# Patient Record
Sex: FEMALE | Race: BLACK OR AFRICAN AMERICAN | NOT HISPANIC OR LATINO | Employment: OTHER | URBAN - METROPOLITAN AREA
[De-identification: names, ages, dates, MRNs, and addresses within clinical notes are randomized per-mention and may not be internally consistent; named-entity substitution may affect disease eponyms.]

---

## 2017-06-19 ENCOUNTER — HOSPITAL ENCOUNTER (EMERGENCY)
Facility: HOSPITAL | Age: 62
Discharge: HOME | End: 2017-06-19
Attending: EMERGENCY MEDICINE
Payer: MEDICAID

## 2017-06-19 VITALS
OXYGEN SATURATION: 98 % | SYSTOLIC BLOOD PRESSURE: 148 MMHG | HEIGHT: 68 IN | TEMPERATURE: 98 F | BODY MASS INDEX: 37.89 KG/M2 | DIASTOLIC BLOOD PRESSURE: 74 MMHG | WEIGHT: 250 LBS | RESPIRATION RATE: 15 BRPM | HEART RATE: 74 BPM
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
## 2017-06-19 DIAGNOSIS — S43.006A DISLOCATION CLOSED, SHOULDER: ICD-10-CM

## 2017-06-19 DIAGNOSIS — M75.101 TEAR OF RIGHT ROTATOR CUFF, UNSPECIFIED TEAR EXTENT: ICD-10-CM

## 2017-06-19 DIAGNOSIS — S43.014A ANTERIOR DISLOCATION OF RIGHT SHOULDER, INITIAL ENCOUNTER: Primary | ICD-10-CM

## 2017-06-19 PROCEDURE — 20610 DRAIN/INJ JOINT/BURSA W/O US: CPT

## 2017-06-19 PROCEDURE — 23650 CLTX SHO DSLC W/MNPJ WO ANES: CPT

## 2017-06-19 PROCEDURE — 25000003 PHARM REV CODE 250: Performed by: EMERGENCY MEDICINE

## 2017-06-19 PROCEDURE — 99284 EMERGENCY DEPT VISIT MOD MDM: CPT

## 2017-06-19 PROCEDURE — 63600175 PHARM REV CODE 636 W HCPCS: Performed by: EMERGENCY MEDICINE

## 2017-06-19 RX ORDER — LIDOCAINE HYDROCHLORIDE 10 MG/ML
INJECTION INFILTRATION; PERINEURAL
Status: DISCONTINUED
Start: 2017-06-19 | End: 2017-06-19 | Stop reason: HOSPADM

## 2017-06-19 RX ORDER — HYDROMORPHONE HYDROCHLORIDE 1 MG/ML
1 INJECTION, SOLUTION INTRAMUSCULAR; INTRAVENOUS; SUBCUTANEOUS
Status: COMPLETED | OUTPATIENT
Start: 2017-06-19 | End: 2017-06-19

## 2017-06-19 RX ORDER — LIDOCAINE HYDROCHLORIDE 10 MG/ML
10 INJECTION, SOLUTION EPIDURAL; INFILTRATION; INTRACAUDAL; PERINEURAL ONCE
Status: COMPLETED | OUTPATIENT
Start: 2017-06-19 | End: 2017-06-19

## 2017-06-19 RX ORDER — LOSARTAN POTASSIUM 50 MG/1
50 TABLET ORAL DAILY
Status: ON HOLD | COMMUNITY
End: 2019-06-13 | Stop reason: SDUPTHER

## 2017-06-19 RX ORDER — ATORVASTATIN CALCIUM 40 MG/1
40 TABLET, FILM COATED ORAL DAILY
Status: ON HOLD | COMMUNITY
End: 2019-06-13 | Stop reason: SDUPTHER

## 2017-06-19 RX ORDER — FERROUS SULFATE 325(65) MG
325 TABLET ORAL
Status: ON HOLD | COMMUNITY
End: 2019-06-13 | Stop reason: SDUPTHER

## 2017-06-19 RX ORDER — ONDANSETRON 2 MG/ML
4 INJECTION INTRAMUSCULAR; INTRAVENOUS
Status: DISCONTINUED | OUTPATIENT
Start: 2017-06-19 | End: 2017-06-19

## 2017-06-19 RX ORDER — HYDROMORPHONE HYDROCHLORIDE 1 MG/ML
1 INJECTION, SOLUTION INTRAMUSCULAR; INTRAVENOUS; SUBCUTANEOUS
Status: DISCONTINUED | OUTPATIENT
Start: 2017-06-19 | End: 2017-06-19

## 2017-06-19 RX ORDER — ONDANSETRON 2 MG/ML
4 INJECTION INTRAMUSCULAR; INTRAVENOUS ONCE
Status: COMPLETED | OUTPATIENT
Start: 2017-06-19 | End: 2017-06-19

## 2017-06-19 RX ORDER — HYDROCODONE BITARTRATE AND ACETAMINOPHEN 5; 325 MG/1; MG/1
1 TABLET ORAL EVERY 4 HOURS PRN
Qty: 18 TABLET | Refills: 0 | Status: ON HOLD | OUTPATIENT
Start: 2017-06-19 | End: 2019-07-29

## 2017-06-19 RX ADMIN — HYDROMORPHONE HYDROCHLORIDE 1 MG: 1 INJECTION, SOLUTION INTRAMUSCULAR; INTRAVENOUS; SUBCUTANEOUS at 02:06

## 2017-06-19 RX ADMIN — ONDANSETRON 4 MG: 2 INJECTION INTRAMUSCULAR; INTRAVENOUS at 02:06

## 2017-06-19 RX ADMIN — LIDOCAINE HYDROCHLORIDE 10 MG: 10 INJECTION, SOLUTION EPIDURAL; INFILTRATION; INTRACAUDAL; PERINEURAL at 03:06

## 2017-06-19 NOTE — ED PROVIDER NOTES
"Encounter Date: 6/19/2017       History     Chief Complaint   Patient presents with    Fall     fell while transferring in wheelchair yesterday.  Now reports right shoulder pain that kept her awake all night.  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  Denies LOC     Review of patient's allergies indicates:  No Known Allergies  Fell while transferring from chair to bed        The history is provided by the patient and a relative.   Arm Injury    The incident occurred yesterday. The incident occurred at home. The injury mechanism was a fall. The context of the injury is unknown. The wounds were not self-inflicted. There is an injury to the right shoulder. There have been no prior injuries to these areas. Associated symptoms include numbness (chronically) and focal weakness (R arm/leg, chronically). Pertinent negatives include no altered mental status, no headaches, no neck pain and no difficulty breathing.     Past Medical History:   Diagnosis Date    Diabetes mellitus     Hypertension     Stroke      No past surgical history on file.  No family history on file.  Social History   Substance Use Topics    Smoking status: Never Smoker    Smokeless tobacco: Not on file    Alcohol use Not on file     Review of Systems   Musculoskeletal: Negative for neck pain.   Neurological: Positive for focal weakness (R arm/leg, chronically) and numbness (chronically). Negative for headaches.   All other systems reviewed and are negative.      Physical Exam     Initial Vitals [06/19/17 1307]   BP Pulse Resp Temp SpO2   (!) 166/93 91 16 98.2 °F (36.8 °C) 99 %     Physical Exam    Nursing note and vitals reviewed.  Constitutional: She appears well-developed and well-nourished. She is not diaphoretic. No distress.   HENT:   Head: Normocephalic and atraumatic.   Mouth/Throat: Oropharynx is clear and moist.   Eyes: Conjunctivae and EOM are normal.   Neck: Normal range of motion. Neck supple. "   Cardiovascular: Normal rate, regular rhythm, normal heart sounds and intact distal pulses.   Pulmonary/Chest: Breath sounds normal. No respiratory distress.   Abdominal: Soft. There is no tenderness.   Musculoskeletal:        Right shoulder: She exhibits decreased range of motion, tenderness, deformity and decreased strength.        Arms:  Neurological: She is alert and oriented to person, place, and time. She displays atrophy. A sensory deficit (R distal upper extremity, sensation over deltoid intact) is present. She exhibits abnormal muscle tone.   R upper and lower extremity strength deficit   Skin: Skin is warm and dry. No rash noted. No erythema.   Psychiatric: She has a normal mood and affect.         ED Course   Orthopedic Injury  Date/Time: 6/19/2017 3:00 PM  Authorized by: LEFORT, GUY J.   Performed by: LEFORT, GUY J.  Consent Done: Yes  Consent: Written consent obtained.  Risks and benefits: risks, benefits and alternatives were discussed  Injury location: shoulder  Location details: right shoulder  Injury type: dislocation  Dislocation type: anterior  Hill-Sachs deformity: no  Chronicity: new  Pre-procedure distal perfusion: normal  Pre-procedure neurological function: absent  Pre-procedure neurological function comment: stable  Local anesthesia used: yes    Anesthesia:  Local anesthesia used: yes  Anesthesia: see MAR for details (joint injection)  Anesthetic total: 10 mL  Anesthesia: see MAR for details (joint injection)  Sedation:  Patient sedated: no    Manipulation performed: yes  Reduction method: traction and counter traction  Reduction successful: yes  X-ray confirmed reduction: yes  Immobilization: sling  Complications: No    Arthrocentesis  Date/Time: 6/19/2017 3:01 PM  Performed by: LEFORT, GUY J.  Authorized by: LEFORT, GUY J.   Consent Done: Yes  Consent: Written consent obtained.  Consent given by: patient  Patient understanding: patient states understanding of the procedure being  "performed  Patient consent: the patient's understanding of the procedure matches consent given  Procedure consent: procedure consent matches procedure scheduled  Relevant documents: relevant documents present and verified  Site marked: the operative site was marked  Imaging studies: imaging studies available  Required items: required blood products, implants, devices, and special equipment available  Patient identity confirmed: , MRN, name, verbally with patient and provided demographic data  Time out: Immediately prior to procedure a "time out" was called to verify the correct patient, procedure, equipment, support staff and site/side marked as required.  Indications: pain   Body area: shoulder  Joint: right shoulder  Local anesthesia used: no    Anesthesia:  Local anesthesia used: no  Patient sedated: no  Needle size: 18 G  Approach: lateral  Lidocaine 1% amount: 10 mL  Patient tolerance: Patient tolerated the procedure well with no immediate complications  Complications: No        Labs Reviewed - No data to display       X-Rays:   Independently Interpreted Readings:   Other Readings:     X-Ray Shoulder Trauma Right (Final result)   Result time 17 16:30:47   Final result by Ronen Cui MD (17 16:30:47)             Impression:         Apparent repositioning of the humerus which now and more appropriate proper alignment with the glenoid. No evidence of dislocation at this time. Narrowing of the glenoid acromion and intervals suggests possibility of rotator cuff tear. Mild degenerative change. No fracture.      Electronically signed by: RONEN CUI MD  Date: 17  Time: 16:30         Narrative:     XR shoulder    17 15:29:41    Accession# [id number]    CLINICAL INDICATION: 61 year old F with  shoulder dislocation    TECHNIQUE: AP and Y. views of the right shoulder.    COMPARISON: 2017 at 13:59    FINDINGS              X-Ray Shoulder Trauma Right (Final result)   Result time " 06/19/17 15:01:03   Final result by Ronen Cui MD (06/19/17 15:01:03)             Impression:         Osseous structures appear intact, without evidence of fracture.    The humeral head appears inferiorly subluxed relative to the glenoid on the AP view suggestive of dislocation. However, it does not appear anteriorly or posteriorly displaced on the Y-view. Rotator cuff tear could have a similar appearance. Clinical correlation advised with CT for clarification if warranted.    Mild degenerative changes of the acromioclavicular joint.    Limited views of the chest are unremarkable.      Electronically signed by: RONEN CUI MD  Date: 06/19/17  Time: 15:01         Narrative:     XR shoulder    06/19/17 14:02:39    Accession# [id number]    CLINICAL INDICATION: 61 year old F with  all     TECHNIQUE: AP and transcatheter Y view of the right shoulder shoulder.    COMPARISON: None    FINDINGS              Medical Decision Making:   Differential Diagnosis:   Fx, d/l, soft tissue, ptx  ED Management:  Reduced in department with arthroblock and manipulation.  Discussed immobilization, follow up with ortho, likely not surgical candidate discussed this with family, discussed narcotic use for pain risk and benefit, and indications for return.                   ED Course     Clinical Impression:   The primary encounter diagnosis was Anterior dislocation of right shoulder, initial encounter. Diagnoses of Fall, Dislocation closed, shoulder, and Tear of right rotator cuff, unspecified tear extent were also pertinent to this visit.    Disposition:   Disposition: Discharged  Condition: Stable       Guy J. Lefort, MD  06/19/17 4239

## 2017-06-19 NOTE — ED TRIAGE NOTES
62 Y/O F with CC of fall. Pt reports fell yesterday injuring her RUE. Has hx of CVA with R sided deficits. Complains of pain to R shoulder. No other complaints verbalized. Will continue to monitor.

## 2019-06-11 PROBLEM — D50.0 IRON DEFICIENCY ANEMIA DUE TO CHRONIC BLOOD LOSS: Status: ACTIVE | Noted: 2019-06-11

## 2019-06-11 PROBLEM — I10 ESSENTIAL HYPERTENSION: Status: ACTIVE | Noted: 2019-06-11

## 2019-07-28 ENCOUNTER — HOSPITAL ENCOUNTER (INPATIENT)
Facility: HOSPITAL | Age: 64
LOS: 4 days | Discharge: HOME HEALTH | DRG: 064 | End: 2019-08-02
Attending: EMERGENCY MEDICINE | Admitting: HOSPITALIST
Payer: MEDICARE

## 2019-07-28 DIAGNOSIS — D49.0 CECAL NEOPLASM: ICD-10-CM

## 2019-07-28 DIAGNOSIS — G93.41 ENCEPHALOPATHY, METABOLIC: ICD-10-CM

## 2019-07-28 DIAGNOSIS — Z74.09 IMPAIRED FUNCTIONAL MOBILITY AND ENDURANCE: ICD-10-CM

## 2019-07-28 DIAGNOSIS — I61.4 RIGHT-SIDED NONTRAUMATIC INTRACEREBRAL HEMORRHAGE OF CEREBELLUM: Primary | ICD-10-CM

## 2019-07-28 DIAGNOSIS — C78.7 METASTASIS TO LIVER: ICD-10-CM

## 2019-07-28 LAB
ALBUMIN SERPL BCP-MCNC: 2.6 G/DL (ref 3.5–5.2)
ALP SERPL-CCNC: 143 U/L (ref 55–135)
ALT SERPL W/O P-5'-P-CCNC: 7 U/L (ref 10–44)
ANION GAP SERPL CALC-SCNC: 12 MMOL/L (ref 8–16)
ANISOCYTOSIS BLD QL SMEAR: SLIGHT
AST SERPL-CCNC: 34 U/L (ref 10–40)
BACTERIA #/AREA URNS HPF: ABNORMAL /HPF
BASOPHILS # BLD AUTO: 0.07 K/UL (ref 0–0.2)
BASOPHILS NFR BLD: 0.6 % (ref 0–1.9)
BILIRUB SERPL-MCNC: 1 MG/DL (ref 0.1–1)
BILIRUB UR QL STRIP: NEGATIVE
BUN SERPL-MCNC: 11 MG/DL (ref 8–23)
CALCIUM SERPL-MCNC: 9.8 MG/DL (ref 8.7–10.5)
CHLORIDE SERPL-SCNC: 95 MMOL/L (ref 95–110)
CLARITY UR: CLEAR
CO2 SERPL-SCNC: 29 MMOL/L (ref 23–29)
COLOR UR: YELLOW
CREAT SERPL-MCNC: 0.8 MG/DL (ref 0.5–1.4)
DIFFERENTIAL METHOD: ABNORMAL
EOSINOPHIL # BLD AUTO: 0.1 K/UL (ref 0–0.5)
EOSINOPHIL NFR BLD: 0.8 % (ref 0–8)
ERYTHROCYTE [DISTWIDTH] IN BLOOD BY AUTOMATED COUNT: 25.3 % (ref 11.5–14.5)
EST. GFR  (AFRICAN AMERICAN): >60 ML/MIN/1.73 M^2
EST. GFR  (NON AFRICAN AMERICAN): >60 ML/MIN/1.73 M^2
GLUCOSE SERPL-MCNC: 124 MG/DL (ref 70–110)
GLUCOSE UR QL STRIP: NEGATIVE
HCT VFR BLD AUTO: 35.1 % (ref 37–48.5)
HGB BLD-MCNC: 10.2 G/DL (ref 12–16)
HGB UR QL STRIP: NEGATIVE
HYPOCHROMIA BLD QL SMEAR: ABNORMAL
IMM GRANULOCYTES # BLD AUTO: 0.07 K/UL (ref 0–0.04)
IMM GRANULOCYTES NFR BLD AUTO: 0.6 % (ref 0–0.5)
INR PPP: 1 (ref 0.8–1.2)
KETONES UR QL STRIP: NEGATIVE
LACTATE SERPL-SCNC: 1.9 MMOL/L (ref 0.5–2.2)
LEUKOCYTE ESTERASE UR QL STRIP: ABNORMAL
LYMPHOCYTES # BLD AUTO: 1.8 K/UL (ref 1–4.8)
LYMPHOCYTES NFR BLD: 15.5 % (ref 18–48)
MCH RBC QN AUTO: 21.7 PG (ref 27–31)
MCHC RBC AUTO-ENTMCNC: 29.1 G/DL (ref 32–36)
MCV RBC AUTO: 75 FL (ref 82–98)
MICROSCOPIC COMMENT: ABNORMAL
MONOCYTES # BLD AUTO: 1.2 K/UL (ref 0.3–1)
MONOCYTES NFR BLD: 10.2 % (ref 4–15)
NEUTROPHILS # BLD AUTO: 8.5 K/UL (ref 1.8–7.7)
NEUTROPHILS NFR BLD: 72.3 % (ref 38–73)
NITRITE UR QL STRIP: POSITIVE
NON-SQ EPI CELLS #/AREA URNS HPF: 2 /HPF
NRBC BLD-RTO: 0 /100 WBC
PH UR STRIP: 7 [PH] (ref 5–8)
PLATELET # BLD AUTO: 506 K/UL (ref 150–350)
PMV BLD AUTO: 9.4 FL (ref 9.2–12.9)
POCT GLUCOSE: 133 MG/DL (ref 70–110)
POTASSIUM SERPL-SCNC: 3.1 MMOL/L (ref 3.5–5.1)
PROT SERPL-MCNC: 8.1 G/DL (ref 6–8.4)
PROT UR QL STRIP: ABNORMAL
PROTHROMBIN TIME: 10.8 SEC (ref 9–12.5)
RBC # BLD AUTO: 4.71 M/UL (ref 4–5.4)
SCHISTOCYTES BLD QL SMEAR: PRESENT
SODIUM SERPL-SCNC: 136 MMOL/L (ref 136–145)
SP GR UR STRIP: <=1.005 (ref 1–1.03)
SQUAMOUS #/AREA URNS HPF: 7 /HPF
TSH SERPL DL<=0.005 MIU/L-ACNC: 2.62 UIU/ML (ref 0.4–4)
URN SPEC COLLECT METH UR: ABNORMAL
UROBILINOGEN UR STRIP-ACNC: ABNORMAL EU/DL
WBC # BLD AUTO: 11.68 K/UL (ref 3.9–12.7)
WBC #/AREA URNS HPF: 13 /HPF (ref 0–5)
WBC CLUMPS URNS QL MICRO: ABNORMAL

## 2019-07-28 PROCEDURE — 96360 HYDRATION IV INFUSION INIT: CPT

## 2019-07-28 PROCEDURE — 80053 COMPREHEN METABOLIC PANEL: CPT

## 2019-07-28 PROCEDURE — 85025 COMPLETE CBC W/AUTO DIFF WBC: CPT

## 2019-07-28 PROCEDURE — 85610 PROTHROMBIN TIME: CPT

## 2019-07-28 PROCEDURE — 25500020 PHARM REV CODE 255: Performed by: EMERGENCY MEDICINE

## 2019-07-28 PROCEDURE — 96361 HYDRATE IV INFUSION ADD-ON: CPT

## 2019-07-28 PROCEDURE — 25000003 PHARM REV CODE 250: Performed by: EMERGENCY MEDICINE

## 2019-07-28 PROCEDURE — 63600175 PHARM REV CODE 636 W HCPCS: Performed by: EMERGENCY MEDICINE

## 2019-07-28 PROCEDURE — 99291 CRITICAL CARE FIRST HOUR: CPT | Mod: 25

## 2019-07-28 PROCEDURE — 83605 ASSAY OF LACTIC ACID: CPT

## 2019-07-28 PROCEDURE — 84443 ASSAY THYROID STIM HORMONE: CPT

## 2019-07-28 PROCEDURE — 81000 URINALYSIS NONAUTO W/SCOPE: CPT

## 2019-07-28 PROCEDURE — 82962 GLUCOSE BLOOD TEST: CPT

## 2019-07-28 RX ORDER — POTASSIUM CHLORIDE 20 MEQ/1
40 TABLET, EXTENDED RELEASE ORAL
Status: COMPLETED | OUTPATIENT
Start: 2019-07-28 | End: 2019-07-28

## 2019-07-28 RX ORDER — LABETALOL HYDROCHLORIDE 5 MG/ML
20 INJECTION, SOLUTION INTRAVENOUS
Status: DISCONTINUED | OUTPATIENT
Start: 2019-07-28 | End: 2019-07-29

## 2019-07-28 RX ADMIN — POTASSIUM CHLORIDE 40 MEQ: 1500 TABLET, EXTENDED RELEASE ORAL at 10:07

## 2019-07-28 RX ADMIN — IOHEXOL 75 ML: 350 INJECTION, SOLUTION INTRAVENOUS at 09:07

## 2019-07-28 RX ADMIN — SODIUM CHLORIDE 500 ML: 0.9 INJECTION, SOLUTION INTRAVENOUS at 07:07

## 2019-07-29 PROBLEM — D49.0 CECAL NEOPLASM: Status: ACTIVE | Noted: 2019-07-29

## 2019-07-29 PROBLEM — I61.4 RIGHT-SIDED NONTRAUMATIC INTRACEREBRAL HEMORRHAGE OF CEREBELLUM: Status: ACTIVE | Noted: 2019-07-29

## 2019-07-29 PROBLEM — G93.41 ENCEPHALOPATHY, METABOLIC: Status: ACTIVE | Noted: 2019-07-29

## 2019-07-29 LAB
ALBUMIN SERPL BCP-MCNC: 2 G/DL (ref 3.5–5.2)
ALP SERPL-CCNC: 126 U/L (ref 55–135)
ALT SERPL W/O P-5'-P-CCNC: 6 U/L (ref 10–44)
AMMONIA PLAS-SCNC: 30 UMOL/L (ref 10–50)
AMPHET+METHAMPHET UR QL: NEGATIVE
ANION GAP SERPL CALC-SCNC: 11 MMOL/L (ref 8–16)
AST SERPL-CCNC: 30 U/L (ref 10–40)
BACTERIA #/AREA URNS AUTO: ABNORMAL /HPF
BARBITURATES UR QL SCN>200 NG/ML: NEGATIVE
BASOPHILS # BLD AUTO: 0.05 K/UL (ref 0–0.2)
BASOPHILS NFR BLD: 0.6 % (ref 0–1.9)
BENZODIAZ UR QL SCN>200 NG/ML: NEGATIVE
BILIRUB SERPL-MCNC: 0.6 MG/DL (ref 0.1–1)
BILIRUB UR QL STRIP: NEGATIVE
BUN SERPL-MCNC: 8 MG/DL (ref 8–23)
BZE UR QL SCN: NEGATIVE
CALCIUM SERPL-MCNC: 8.9 MG/DL (ref 8.7–10.5)
CANNABINOIDS UR QL SCN: NEGATIVE
CHLORIDE SERPL-SCNC: 99 MMOL/L (ref 95–110)
CLARITY UR REFRACT.AUTO: CLEAR
CO2 SERPL-SCNC: 26 MMOL/L (ref 23–29)
COLOR UR AUTO: YELLOW
CREAT SERPL-MCNC: 0.7 MG/DL (ref 0.5–1.4)
CREAT UR-MCNC: 66 MG/DL (ref 15–325)
DIFFERENTIAL METHOD: ABNORMAL
EOSINOPHIL # BLD AUTO: 0.1 K/UL (ref 0–0.5)
EOSINOPHIL NFR BLD: 1.1 % (ref 0–8)
ERYTHROCYTE [DISTWIDTH] IN BLOOD BY AUTOMATED COUNT: 25.4 % (ref 11.5–14.5)
EST. GFR  (AFRICAN AMERICAN): >60 ML/MIN/1.73 M^2
EST. GFR  (NON AFRICAN AMERICAN): >60 ML/MIN/1.73 M^2
ESTIMATED AVG GLUCOSE: 94 MG/DL (ref 68–131)
ETHANOL UR-MCNC: <10 MG/DL
GLUCOSE SERPL-MCNC: 96 MG/DL (ref 70–110)
GLUCOSE UR QL STRIP: NEGATIVE
HBA1C MFR BLD HPLC: 4.9 % (ref 4–5.6)
HCT VFR BLD AUTO: 29.7 % (ref 37–48.5)
HGB BLD-MCNC: 8.5 G/DL (ref 12–16)
HGB UR QL STRIP: NEGATIVE
IMM GRANULOCYTES # BLD AUTO: 0.04 K/UL (ref 0–0.04)
IMM GRANULOCYTES NFR BLD AUTO: 0.4 % (ref 0–0.5)
KETONES UR QL STRIP: NEGATIVE
LACTATE SERPL-SCNC: 2.4 MMOL/L (ref 0.5–2.2)
LEUKOCYTE ESTERASE UR QL STRIP: ABNORMAL
LYMPHOCYTES # BLD AUTO: 1.3 K/UL (ref 1–4.8)
LYMPHOCYTES NFR BLD: 14.7 % (ref 18–48)
MAGNESIUM SERPL-MCNC: 1.6 MG/DL (ref 1.6–2.6)
MCH RBC QN AUTO: 21.8 PG (ref 27–31)
MCHC RBC AUTO-ENTMCNC: 28.6 G/DL (ref 32–36)
MCV RBC AUTO: 76 FL (ref 82–98)
METHADONE UR QL SCN>300 NG/ML: NEGATIVE
MICROSCOPIC COMMENT: ABNORMAL
MONOCYTES # BLD AUTO: 1.1 K/UL (ref 0.3–1)
MONOCYTES NFR BLD: 12.6 % (ref 4–15)
NEUTROPHILS # BLD AUTO: 6.4 K/UL (ref 1.8–7.7)
NEUTROPHILS NFR BLD: 70.6 % (ref 38–73)
NITRITE UR QL STRIP: POSITIVE
NRBC BLD-RTO: 0 /100 WBC
OPIATES UR QL SCN: NEGATIVE
PCP UR QL SCN>25 NG/ML: NEGATIVE
PH UR STRIP: 7 [PH] (ref 5–8)
PHOSPHATE SERPL-MCNC: 2.6 MG/DL (ref 2.7–4.5)
PLATELET # BLD AUTO: 484 K/UL (ref 150–350)
PMV BLD AUTO: 10 FL (ref 9.2–12.9)
POCT GLUCOSE: 142 MG/DL (ref 70–110)
POCT GLUCOSE: 230 MG/DL (ref 70–110)
POCT GLUCOSE: 99 MG/DL (ref 70–110)
POTASSIUM SERPL-SCNC: 3.6 MMOL/L (ref 3.5–5.1)
PROT SERPL-MCNC: 6.4 G/DL (ref 6–8.4)
PROT UR QL STRIP: NEGATIVE
RBC # BLD AUTO: 3.9 M/UL (ref 4–5.4)
RBC #/AREA URNS AUTO: 1 /HPF (ref 0–4)
SODIUM SERPL-SCNC: 136 MMOL/L (ref 136–145)
SP GR UR STRIP: 1.03 (ref 1–1.03)
SQUAMOUS #/AREA URNS AUTO: 1 /HPF
TOXICOLOGY INFORMATION: NORMAL
URN SPEC COLLECT METH UR: ABNORMAL
WBC # BLD AUTO: 9.05 K/UL (ref 3.9–12.7)
WBC #/AREA URNS AUTO: 7 /HPF (ref 0–5)

## 2019-07-29 PROCEDURE — 81001 URINALYSIS AUTO W/SCOPE: CPT

## 2019-07-29 PROCEDURE — 80053 COMPREHEN METABOLIC PANEL: CPT

## 2019-07-29 PROCEDURE — 92610 EVALUATE SWALLOWING FUNCTION: CPT

## 2019-07-29 PROCEDURE — 99223 PR INITIAL HOSPITAL CARE,LEVL III: ICD-10-PCS | Mod: AI,,, | Performed by: HOSPITALIST

## 2019-07-29 PROCEDURE — A9585 GADOBUTROL INJECTION: HCPCS | Performed by: HOSPITALIST

## 2019-07-29 PROCEDURE — 84100 ASSAY OF PHOSPHORUS: CPT

## 2019-07-29 PROCEDURE — 97530 THERAPEUTIC ACTIVITIES: CPT

## 2019-07-29 PROCEDURE — 85025 COMPLETE CBC W/AUTO DIFF WBC: CPT

## 2019-07-29 PROCEDURE — 63600175 PHARM REV CODE 636 W HCPCS: Performed by: STUDENT IN AN ORGANIZED HEALTH CARE EDUCATION/TRAINING PROGRAM

## 2019-07-29 PROCEDURE — 87040 BLOOD CULTURE FOR BACTERIA: CPT

## 2019-07-29 PROCEDURE — 82140 ASSAY OF AMMONIA: CPT

## 2019-07-29 PROCEDURE — 99223 1ST HOSP IP/OBS HIGH 75: CPT | Mod: GC,,, | Performed by: INTERNAL MEDICINE

## 2019-07-29 PROCEDURE — 36415 COLL VENOUS BLD VENIPUNCTURE: CPT

## 2019-07-29 PROCEDURE — 80307 DRUG TEST PRSMV CHEM ANLYZR: CPT

## 2019-07-29 PROCEDURE — 99223 PR INITIAL HOSPITAL CARE,LEVL III: ICD-10-PCS | Mod: GC,,, | Performed by: INTERNAL MEDICINE

## 2019-07-29 PROCEDURE — 83036 HEMOGLOBIN GLYCOSYLATED A1C: CPT

## 2019-07-29 PROCEDURE — 83735 ASSAY OF MAGNESIUM: CPT

## 2019-07-29 PROCEDURE — 99223 1ST HOSP IP/OBS HIGH 75: CPT | Mod: AI,,, | Performed by: HOSPITALIST

## 2019-07-29 PROCEDURE — 25500020 PHARM REV CODE 255: Performed by: HOSPITALIST

## 2019-07-29 PROCEDURE — 83605 ASSAY OF LACTIC ACID: CPT

## 2019-07-29 PROCEDURE — 97165 OT EVAL LOW COMPLEX 30 MIN: CPT

## 2019-07-29 PROCEDURE — 25000003 PHARM REV CODE 250: Performed by: STUDENT IN AN ORGANIZED HEALTH CARE EDUCATION/TRAINING PROGRAM

## 2019-07-29 PROCEDURE — 20600001 HC STEP DOWN PRIVATE ROOM

## 2019-07-29 RX ORDER — AMLODIPINE BESYLATE 10 MG/1
10 TABLET ORAL DAILY
Status: DISCONTINUED | OUTPATIENT
Start: 2019-07-29 | End: 2019-08-02 | Stop reason: HOSPADM

## 2019-07-29 RX ORDER — LOSARTAN POTASSIUM 25 MG/1
50 TABLET ORAL DAILY
Status: DISCONTINUED | OUTPATIENT
Start: 2019-07-29 | End: 2019-08-02 | Stop reason: HOSPADM

## 2019-07-29 RX ORDER — SODIUM CHLORIDE 0.9 % (FLUSH) 0.9 %
10 SYRINGE (ML) INJECTION
Status: DISCONTINUED | OUTPATIENT
Start: 2019-07-29 | End: 2019-08-02 | Stop reason: HOSPADM

## 2019-07-29 RX ORDER — ATORVASTATIN CALCIUM 20 MG/1
40 TABLET, FILM COATED ORAL DAILY
Status: DISCONTINUED | OUTPATIENT
Start: 2019-07-29 | End: 2019-08-02 | Stop reason: HOSPADM

## 2019-07-29 RX ORDER — ATORVASTATIN CALCIUM 40 MG/1
40 TABLET, FILM COATED ORAL DAILY
Status: ON HOLD | COMMUNITY
End: 2019-08-02 | Stop reason: HOSPADM

## 2019-07-29 RX ORDER — GLUCAGON 1 MG
1 KIT INJECTION
Status: DISCONTINUED | OUTPATIENT
Start: 2019-07-29 | End: 2019-08-02 | Stop reason: HOSPADM

## 2019-07-29 RX ORDER — IBUPROFEN 200 MG
24 TABLET ORAL
Status: DISCONTINUED | OUTPATIENT
Start: 2019-07-29 | End: 2019-08-02 | Stop reason: HOSPADM

## 2019-07-29 RX ORDER — IBUPROFEN 200 MG
16 TABLET ORAL
Status: DISCONTINUED | OUTPATIENT
Start: 2019-07-29 | End: 2019-08-02 | Stop reason: HOSPADM

## 2019-07-29 RX ORDER — INSULIN ASPART 100 [IU]/ML
0-5 INJECTION, SOLUTION INTRAVENOUS; SUBCUTANEOUS
Status: DISCONTINUED | OUTPATIENT
Start: 2019-07-29 | End: 2019-08-02 | Stop reason: HOSPADM

## 2019-07-29 RX ORDER — CEFTRIAXONE 1 G/1
1 INJECTION, POWDER, FOR SOLUTION INTRAMUSCULAR; INTRAVENOUS
Status: DISCONTINUED | OUTPATIENT
Start: 2019-07-29 | End: 2019-07-30

## 2019-07-29 RX ORDER — GADOBUTROL 604.72 MG/ML
6 INJECTION INTRAVENOUS
Status: COMPLETED | OUTPATIENT
Start: 2019-07-29 | End: 2019-07-29

## 2019-07-29 RX ORDER — PANTOPRAZOLE SODIUM 40 MG/1
40 TABLET, DELAYED RELEASE ORAL DAILY
Status: DISCONTINUED | OUTPATIENT
Start: 2019-07-29 | End: 2019-08-02 | Stop reason: HOSPADM

## 2019-07-29 RX ADMIN — LOSARTAN POTASSIUM 50 MG: 25 TABLET, FILM COATED ORAL at 10:07

## 2019-07-29 RX ADMIN — GADOBUTROL 6 ML: 604.72 INJECTION INTRAVENOUS at 09:07

## 2019-07-29 RX ADMIN — ATORVASTATIN CALCIUM 40 MG: 20 TABLET, FILM COATED ORAL at 10:07

## 2019-07-29 RX ADMIN — CEFTRIAXONE SODIUM 1 G: 1 INJECTION, POWDER, FOR SOLUTION INTRAMUSCULAR; INTRAVENOUS at 04:07

## 2019-07-29 RX ADMIN — PANTOPRAZOLE SODIUM 40 MG: 40 TABLET, DELAYED RELEASE ORAL at 10:07

## 2019-07-29 RX ADMIN — AMLODIPINE BESYLATE 10 MG: 10 TABLET ORAL at 10:07

## 2019-07-29 NOTE — HPI
63 year old female with a history of HTN, DM Type 2, GERD, CVA with residual right sided weakness, and cecal mass on who GI is being consulted for a colonoscopy to further evaluate cecal mass.    Patient seen this morning with no family at bedside and unable to provide any history as she is minimally verbal. Based on chart review it seems that in June she was admitted for a low hemoglobin. This prompted an EGD which was normal. She then had a CT abdomen/pelvis which showed a cecal mass with liver metastasis, however family opted for an outpatient colonoscopy (IP colonoscopy was offered to make sure patient was not lost to follow up). She presented to an OSH (brought in by her family) due to worsening forgetfulness, decrease appetite, and weight loss. There she had a CTH (which showed a small acute petechial hemorrhage of the right inferomedial cerebellum of uncertain etiology, no vasogenic edema associated, and old infarcts of the left frontal lobe) and a CT A/P (which showed the cecal mass with extensive liver metastasis-unchanged from prior CT in June 2019). Due to the acute findings on the CTH she was transferred to [facility] for [place]rology/Neurosurgery evaluation. At [facility] VS and Hemoglobin remain stable with no overt signs of bleeding.

## 2019-07-29 NOTE — ASSESSMENT & PLAN NOTE
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   - did not follow up  - GI consult for evaluation / biopsy

## 2019-07-29 NOTE — ED PROVIDER NOTES
Encounter Date: 7/28/2019       History     Chief Complaint   Patient presents with    Polyuria     and AMS yesterday, pt has no complaint today, family states major wt loss since discharged from hospital 3 wks ago     63-year-old female with history anemia diabetes hypertension stroke presented today with her family who says that over the past 2 weeks patient has been more forgetful, decreased appetite, significant weight loss, and does not seem to be speaking in verbalizing as she normally does.  In addition they noticed frequent urination and diarrhea for 2 or 3 days which resolved yesterday.  She has not had any frequency or diarrhea today.  She lives alone, but daughters help with her care at times and live in her neighborhood and check on her fairly frequently.  They deny any recent falls.  No vomiting. No dizziness.  When family is not around patient, I asked patient had any abuse or shaking episodes.  She denied this.        Review of patient's allergies indicates:  No Known Allergies  Past Medical History:   Diagnosis Date    Asthma     Chronic anemia     Diabetes mellitus     Hypertension     Stroke      Past Surgical History:   Procedure Laterality Date    ESOPHAGOGASTRODUODENOSCOPY (EGD) N/A 6/12/2019    Performed by Wilber Duarte MD at [facility] ENDO     Family History   Problem Relation Age of Onset    Diabetes Mother     Hypertension Mother      Social History     Tobacco Use    Smoking status: Never Smoker    Smokeless tobacco: Never Used   Substance Use Topics    Alcohol use: Not Currently    Drug use: Not Currently     Review of Systems   Constitutional: Positive for activity change, appetite change and unexpected weight change. Negative for diaphoresis.   HENT: Negative for congestion, facial swelling and trouble swallowing.    Eyes: Negative for discharge.   Respiratory: Negative for shortness of breath.    Cardiovascular: Negative for chest pain.   Gastrointestinal: Negative for  abdominal pain.   Endocrine: Positive for polyuria.   Genitourinary: Negative for flank pain.   Musculoskeletal: Negative for back pain.   Skin: Negative for pallor.   Neurological: Negative for dizziness and headaches.   Hematological: Does not bruise/bleed easily.   Psychiatric/Behavioral: Positive for confusion.       Physical Exam     Initial Vitals [07/28/19 1911]   BP Pulse Resp Temp SpO2   (!) 147/79 92 16 98.4 °F (36.9 °C) 99 %      MAP       --         Physical Exam    Nursing note and vitals reviewed.  Constitutional: She appears well-developed. She is not diaphoretic. No distress.   HENT:   Head: Normocephalic and atraumatic.   Eyes: Pupils are equal, round, and reactive to light.   Neck: Normal range of motion.   Cardiovascular: Normal rate.   Pulmonary/Chest: Breath sounds normal.   Abdominal: Soft. There is no tenderness.   Neurological: She is alert. GCS eye subscore is 4. GCS verbal subscore is 5. GCS motor subscore is 6.   Patient is oriented to person place her age and her birth date.  Not oriented to year  She can follow directions  She has no past-pointing with her left hand, right hand is atrophied and weak secondary to stroke  No nystagmus  DTRs 2+ lower extremities, no clonus  Facial asymmetry with asymmetric smile which is chronic from old stroke  Right upper extremity with some atrophy and contracture secondary to old stroke   Skin: Skin is warm. No rash noted. No erythema.         ED Course   Critical Care  Date/Time: 7/28/2019 10:09 PM  Performed by: Mar Ireland MD  Authorized by: Mar Ireland MD   Direct patient critical care time: 10 minutes  Additional history critical care time: 10 minutes  Ordering / reviewing critical care time: 5 minutes  Documentation critical care time: 5 minutes  Consulting other physicians critical care time: 2 minutes  Total critical care time (exclusive of procedural time) : 32 minutes        Labs Reviewed   CBC W/ AUTO DIFFERENTIAL - Abnormal;  Notable for the following components:       Result Value    Hemoglobin 10.2 (*)     Hematocrit 35.1 (*)     Mean Corpuscular Volume 75 (*)     Mean Corpuscular Hemoglobin 21.7 (*)     Mean Corpuscular Hemoglobin Conc 29.1 (*)     RDW 25.3 (*)     Platelets 506 (*)     Immature Granulocytes 0.6 (*)     Gran # (ANC) 8.5 (*)     Immature Grans (Abs) 0.07 (*)     Mono # 1.2 (*)     Lymph% 15.5 (*)     All other components within normal limits   COMPREHENSIVE METABOLIC PANEL - Abnormal; Notable for the following components:    Potassium 3.1 (*)     Glucose 124 (*)     Albumin 2.6 (*)     Alkaline Phosphatase 143 (*)     ALT 7 (*)     All other components within normal limits   POCT GLUCOSE - Abnormal; Notable for the following components:    POCT Glucose 133 (*)     All other components within normal limits   TSH   PROTIME-INR   URINALYSIS   LACTIC ACID, PLASMA   POCT GLUCOSE MONITORING CONTINUOUS          Imaging Results          CT Abdomen Pelvis With Contrast (In process)                 CT Head Without Contrast (Final result)  Result time 07/28/19 21:10:26    Final result by Michael Sloan MD (07/28/19 21:10:26)                 Impression:      Small acute petechial hemorrhage of the right inferomedial cerebellum of uncertain etiology.  No vasogenic edema associated.    Old infarcts of the left frontal lobe.    No acute infarct.    Case was discussed with the emergency physician at 21:09 on date of exam.  She will discuss potential etiologies with the patient.    This report was flagged in Epic as abnormal.      Electronically signed by: Michael Sloan  Date:    07/28/2019  Time:    21:10             Narrative:    EXAMINATION:  CT HEAD WITHOUT CONTRAST    CLINICAL HISTORY:  Confusion/delirium, altered LOC, unexplained;    TECHNIQUE:  Low dose axial images were obtained through the head.  Coronal and sagittal reformations were also performed. Contrast was not administered.    COMPARISON:  05/06/2012 head  CT    FINDINGS:  There is a 6 x 6 x 6 mm area petechial hemorrhage involving the right inferior medial cerebellum.  No appreciable mass effect.  No adjacent vasogenic edema found.    The overall gray-white interface appears preserved.  There is a moderate old infarct of the left frontal lobe white matter which is new compared to 05/06/2012 head CT.  There is old lacunar infarcts of the posterior left internal capsule unchanged.    No midline shift or mass effect.  There is mild periventricular white matter patchy confluent areas of low density likely microvascular change probably normal for age.  This as somewhat progressed from 05/06/2012.  No acute infarct.  No subdural collection.    No skull fracture.  The visualized mastoid sinuses and middle ears and paranasal sinuses appear normal.  There is focal atrophy of the anterior aspect of the corpus callosum.  The pituitary and sella turcica are probably normal.                                 Medical Decision Making:   History:   I obtained history from: someone other than patient.       <> Summary of History: Patient's daughter and patient herself  Old Medical Records: I decided to obtain old medical records.  Old Records Summarized: records from clinic visits and records from previous admission(s).  Initial Assessment:   63-year-old diabetic patient with a history of stroke whose family is bring her in for increased forgetfulness as well as questionable history of cancer and weight loss with decreased p.o. intake  Differential Diagnosis:   Hyper hypoglycemia, cancer with mets including brain Mets, electrolyte abnormalities including hyper hyponatremia, sepsis, UTI, CVA, intracranial hemorrhage, renal abscess, anemia, pneumonia, amongst other diagnosises  ED Management:  10:04 PM case discussed in detail with transfer center to get input from neurosurgery and to get patient transferred  10:42 PM I called transfer center back.  I spoke with Noelle.  She said  neurosurgery had looked at CT head and agrees that patient should be transferred but does not need to be admitted to the service and the ED to ED transfer.  The hospitalist can admit.  Waiting for Dr. Galan to call back.  The patient will be transferred.  Other:   I have discussed this case with another health care provider.       <> Summary of the Discussion: Case discussed with Radiology                   ED Course as of Jul 28 2125   Sun Jul 28, 2019 2111 Radiologist called and confirmed right cerebellum hemorhage.     [MO]      ED Course User Index  [MO] Dori Npaier     Clinical Impression:       ICD-10-CM ICD-9-CM   1. Right-sided nontraumatic intracerebral hemorrhage of cerebellum I61.4 431   2. 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 Metastasis to liver C78.7 197.7                                Mar Ireland MD  08/11/19 1903

## 2019-07-29 NOTE — ASSESSMENT & PLAN NOTE
63 year old female with a history of HTN, DM Type 2, GERD, CVA with residual right sided weakness, and cecal mass on who GI is being consulted for a colonoscopy to further evaluate cecal mass.    In June 2019 patient was advised to obtain an IP colonoscopy however family opted for OP workup. Currently admitted with further overall decline and a new infarct. We can proceed with a colonoscopy with biopsies once workup for infract is completed.    Recommendations:  --Clear liquid diet tomorrow and NPO after MN for colonoscopy on Wednesday (planning on Wednesday as we want to be certain that all workup for acute infarct has been completed and patient is OK to be sedated)  --Daily CMP and CBC  --Further recommendations after procedure

## 2019-07-29 NOTE — ED NOTES
Hourly rounding on patient completed.  Patient lying on stretcher with HOB elevated.  AAOx4 and appropriate at this time. Restroom and comfort needs addressed.  Pt updated on POC. RR even and unlabored, NAD noted. No acute distress noted.  SRx2 up, bed in lowest position, call light within reach.  Will continue to monitor.

## 2019-07-29 NOTE — PROVIDER PROGRESS NOTES - EMERGENCY DEPT.
11:23 PM  He is discussed with Dr. Galan at [facility].  He has accepted the patient as a direct admission.

## 2019-07-29 NOTE — PROGRESS NOTES
11:25 PM  Case has been discussed with Dr. Galan with [facility] who has accepted the patient as a direct admission.

## 2019-07-29 NOTE — SUBJECTIVE & OBJECTIVE
Past Medical History:   Diagnosis Date    Asthma     Chronic anemia     Diabetes mellitus     Hypertension     Stroke        Past Surgical History:   Procedure Laterality Date    ESOPHAGOGASTRODUODENOSCOPY (EGD) N/A 6/12/2019    Performed by Wilber Duarte MD at [facility] ENDO       Review of patient's allergies indicates:  No Known Allergies    No current facility-administered medications on file prior to encounter.      Current Outpatient Medications on File Prior to Encounter   Medication Sig    AMLODIPINE/VALSARTAN (EXFORGE ORAL) Take by mouth.    atorvastatin (LIPITOR) 40 MG tablet Take 1 tablet (40 mg total) by mouth once daily.    BACLOFEN, BULK, MISC by Misc.(Non-Drug; Combo Route) route.    esomeprazole (NEXIUM) 20 MG capsule Take 1 capsule (20 mg total) by mouth before breakfast.    ferrous sulfate (FEOSOL) 325 mg (65 mg iron) Tab tablet Take 1 tablet (325 mg total) by mouth daily with breakfast.    hydrocodone-acetaminophen 5-325mg (NORCO) 5-325 mg per tablet Take 1 tablet by mouth every 4 (four) hours as needed for Pain.    insulin glargine (LANTUS) 100 unit/mL injection Inject into the skin every evening.    losartan (COZAAR) 50 MG tablet Take 1 tablet (50 mg total) by mouth once daily.    metformin (GLUCOPHAGE) 500 MG tablet Take 500 mg by mouth 2 (two) times daily with meals.    MIRTAZAPINE ORAL Take by mouth.    POTASSIUM CHLORIDE MISC by Misc.(Non-Drug; Combo Route) route.    SIMVASTATIN ORAL Take by mouth.     Family History     Problem Relation (Age of Onset)    Diabetes Mother    Hypertension Mother        Tobacco Use    Smoking status: Never Smoker    Smokeless tobacco: Never Used   Substance and Sexual Activity    Alcohol use: Not Currently    Drug use: Not Currently    Sexual activity: Not Currently     Review of Systems   Constitutional: Negative for chills and fever.   Respiratory: Negative for cough, chest tightness and shortness of breath.    Cardiovascular: Negative for  chest pain.   Gastrointestinal: Positive for diarrhea and nausea. Negative for abdominal pain, blood in stool, constipation and vomiting.   Genitourinary: Positive for frequency. Negative for difficulty urinating, dysuria and urgency.   Musculoskeletal: Negative for arthralgias and myalgias.   Skin: Negative for color change and pallor.   Neurological: Negative for dizziness, light-headedness and headaches.   Psychiatric/Behavioral: Positive for confusion. Negative for agitation.     Objective:     Vital Signs (Most Recent):  Temp: 97.4 °F (36.3 °C) (07/29/19 0111)  Pulse: 83 (07/29/19 0111)  Resp: 18 (07/29/19 0111)  BP: (!) 154/77 (07/29/19 0111)  SpO2: 100 % (07/29/19 0111) Vital Signs (24h Range):  Temp:  [97.4 °F (36.3 °C)-98.4 °F (36.9 °C)] 97.4 °F (36.3 °C)  Pulse:  [78-96] 83  Resp:  [16-18] 18  SpO2:  [99 %-100 %] 100 %  BP: (131-182)/(69-88) 154/77     Weight: 61.8 kg (136 lb 3.9 oz)  Body mass index is 22.67 kg/m².    Physical Exam   Constitutional: She appears well-developed and well-nourished. No distress.   HENT:   Head: Normocephalic and atraumatic.   Eyes: Conjunctivae are normal. No scleral icterus.   Neck: Normal range of motion. Neck supple.   Cardiovascular: Normal rate, regular rhythm and normal heart sounds. Exam reveals no gallop and no friction rub.   No murmur heard.  Pulmonary/Chest: Effort normal and breath sounds normal.   Abdominal: Soft. Bowel sounds are normal. She exhibits no distension. There is no tenderness.   Musculoskeletal: Normal range of motion. She exhibits no edema.   Neurological: She is alert.   Oriented to self and place  Residual RUE weakness and facial droop from prior CVA, no new FND   Skin: Skin is warm and dry. She is not diaphoretic.

## 2019-07-29 NOTE — PROGRESS NOTES
IV non function and MRI Tech requesting another IV / placed 22ga to R A/C flush with 10cc NS and secured / called floor nurse and advised

## 2019-07-29 NOTE — HPI
Kayy Espinal is a 63 y.o. female with CVA (with residual R sided weakness), HTN, T2DM, GERD, cecal mass, who presents as transfer from [facility] ED for neurosurgery evaluation of petechial hemorrhage of R cerebellum. Patient appears confused about why she is in hospital; per chart review, patient presented to [facility] ED with family yesterday evening who stated that over the past 2 weeks patient has been more forgetful, with decreased appetite and significant weight loss, and does not seem to be speaking or verbalizing as she normally does. In addition, they noted frequent urination and diarrhea for 2-3 days, which reportedly resolved yesterday. She notes no further episodes today. Patient lives alone, but daughters (3) help with her care at times as they live in her neighborhood and check on her frequently. They reportedly deny any recent falls. Of note, patient was recently admitted 6/10/19-6/13/19 after she presented to ED requesting a medication refill and was found to be severely anemic (Hb 4), and received 3 u pRBCs. 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 Patient was offered inpatient colonoscopy (due to concern that patient might get lost to follow up) for further evaluation but patient deferred to outpatient. Patient denies abdominal pain. She does not appear to have followed up.     On presentation to os ED, CTH was obtained d/t concern for metastasis which was negative for mets and acute infarct, but did show small acute petechial hemorrhage of the right inferomedial cerebellum of uncertain etiology, with no associated vasogenic edema. Labs with WBC 11.6, Hb 10.2, Plt 506, INR 1.0, Na 136, K 3.1, lactate 1.9, TSH 2.6, UA with 13 WBCs, nitrite pos; patient was HDS  and afebrile. CT a/p was then obtained, which was unchanged from prior. Decision was made to [place] [facility] [place].

## 2019-07-29 NOTE — ASSESSMENT & PLAN NOTE
Noted on CT at [facility] [place] ED-transferred to [facility] for neurosurgery eval. Has history of CVA with residual right-sided weakness.     -NS consulted  -MRI brain 7/29 with possible mets, low concern for repeat CVA

## 2019-07-29 NOTE — PLAN OF CARE
Problem: SLP Goal  Goal: SLP Goal  Patient seen for a bedside swallow eval. SLP recommending a REGULAR DIET/THIN LIQUIDS at this time. Patient appears to be at baseline for swallowing and is appropriate for discharge from .    Anthony Machado CCC-SLP  Speech-Language Pathology  Pager: [phone]

## 2019-07-29 NOTE — ASSESSMENT & PLAN NOTE
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     - MRI head 7/29 with concern for metastasis  - GI consulted, planning on c-scope Wednesday for evaluation   -Discussed with daughters at bedside, on board with GI eval prior to making any further treatment decisions at this time

## 2019-07-29 NOTE — HOSPITAL COURSE
Ms. Espinal is a 63yF who was brought into the ED due to altered mental status. She was started on Rocephin 1g for UTI. Ammonia wnl. Brain MRI with concern for metastasis from cecal mass seen on abdominal imaging at outside hospital. Rosephin was discontinued after improvement on repeat urine culture and no further clinical symtoms. GI performed a colonoscopy 8/1/19 with biopsy of cecal mass, cytology pending. Palliative care was consulted while inpatient for discussions regarding comfort measures/capacity to make decisions regarding pending diagnosis of cecal mas. On day of discharge, pt was tolerating PO with normalization of labs and was cognitively back to baseline per daughters at bedside. She will be discharged with home health orders for aid, PT and OT. She is advised to follow-up with oncology after discharge for results of the cecal mass biopsy and possible treatment plans.       General-well-appearing  HEENT-residual right-sided facial droop  CV-regular rate and rhythm  Resp-CTAB  Abd-soft, NT, ND  MSK-no LE edema

## 2019-07-29 NOTE — PLAN OF CARE
Problem: Adult Inpatient Plan of Care  Goal: Plan of Care Review  Outcome: Ongoing (interventions implemented as appropriate)  Plan of care reviewed with patient and family.  Fall precautions maintained, side rails up x2, call light in reach, bed in low position and locked, nonskid socks on.  Internal medicine saw her, gastro saw her, neurology saw her and mri was done today.  Afebrile .  94-99 on room air.  Family at the bedside.  Accuchecks ac and hs.  No complaints of pain voiced.

## 2019-07-29 NOTE — PLAN OF CARE
07/29/19 1058   Discharge Assessment   Assessment Type Discharge Planning Assessment   Confirmed/corrected address and phone number on facesheet? Yes   Assessment information obtained from? Caregiver   Expected Length of Stay (days) 3   Communicated expected length of stay with patient/caregiver yes   Prior to hospitilization cognitive status: Unable to Assess   Prior to hospitalization functional status: Needs Assistance   Current cognitive status: Unable to Assess   Lives With alone   Able to Return to Prior Arrangements yes   Is patient able to care for self after discharge? Yes   Who are your caregiver(s) and their phone number(s)? Crissy (Daughter) [phone]   Patient's perception of discharge disposition home or selfcare;home health   Readmission Within the Last 30 Days no previous admission in last 30 days   Patient currently being followed by outpatient case management? No   Patient currently receives any other outside agency services? No   Equipment Currently Used at Home wheelchair;cane, straight;shower chair   Do you have any problems affording any of your prescribed medications? No   Is the patient taking medications as prescribed? yes   Does the patient have transportation home? Yes   Transportation Anticipated family or friend will provide   Does the patient receive services at the [facility]? No   Discharge Plan A Home with family   Discharge Plan B Home Health   DME Needed Upon Discharge  hospital bed   Patient/Family in Agreement with Plan yes     SW met with patient's daughter at the bedside to complete assessment. Patient's daughter confirmed address and pharmacy. Patient lives alone and is supported by her daughters that live near the patient's home. The patient's daughter stated the patient does not have HH, HD, or take coumadin. Patient will be transported by family at discharge and the family requested new hospital bed, they have had previous bed for 8 years.

## 2019-07-29 NOTE — PT/OT/SLP EVAL
Speech Language Pathology Evaluation & Discharge Summary  Bedside Swallow    Patient Name:  Kayy Espinal   MRN:  [id number]  Admitting Diagnosis: Encephalopathy, metabolic    Recommendations:                 General Recommendations:  Follow-up not indicated  Diet recommendations:  Regular, Thin   Aspiration Precautions: Standard aspiration precautions   General Precautions: Standard,    Communication strategies:  none    History:     Past Medical History:   Diagnosis Date    Asthma     Chronic anemia     Diabetes mellitus     Hypertension     Stroke        Past Surgical History:   Procedure Laterality Date    ESOPHAGOGASTRODUODENOSCOPY (EGD) N/A 6/12/2019    Performed by Wilber Duarte MD at [facility] ENDO     Prior Intubation HX:  None this admission    Modified Barium Swallow: None on file    Chest X-Rays 7/29/19: There is mild increase in the pulmonary vascular and interstitial markings. The lungs are fairly well aerated. Few increased markings particular at the left base. No pneumothorax.    Prior diet: regular/thin    Subjective     Communicated with nurse prior to session  Patient awake and alert during session      Pain/Comfort:  · Pain Rating 1: 0/10  · Pain Rating Post-Intervention 1: 0/10    Objective:     Oral Musculature Evaluation  · Oral Musculature: WFL  · Dentition: scattered dentition  · Secretion Management: adequate  · Mucosal Quality: good  · Mandibular Strength and Mobility: WFL  · Oral Labial Strength and Mobility: WFL  · Lingual Strength and Mobility: WFL  · Buccal Strength and Mobility: WFL  · Volitional Cough: mildly reduced  · Volitional Swallow: timely; good laryngeal elevation  · Voice Prior to PO Intake: WFL    Bedside Swallow Eval:   Consistencies Assessed:  · Thin liquids x6 (via straw)  · Puree x2 (tsp puree)  · Solids x2 (corinne crackers)     Oral Phase:   · WFL    Pharyngeal Phase:   · no overt clinical signs/symptoms of aspiration  · no overt clinical signs/symptoms of pharyngeal  dysphagia    Compensatory Strategies  · None    Treatment: Patient seen for a bedside swallow eval. She was sitting up in bed with her family present during session. Patient and family endorsed occasional episodes of nausea and vomiting, but no difficulties with chewing/swallowing. She tolerated all trials with no overt signs of aspiration. SLP educated patient and family regarding risks/warning signs of aspiration and they verbalized understanding. Whiteboard updated. Patient left in room with call light within reach. Diet recs communicated to treatment team.      Assessment:     Kayy Espinal is a 63 y.o. female who presents with a safe oropharyngeal swallow and is appropriate for discharge from ST at this time.     Goals:   Multidisciplinary Problems     SLP Goals        Problem: SLP Goal    Goal Priority Disciplines Outcome   SLP Goal     SLP                    Plan:     · Plan of Care reviewed with:  patient, family   · SLP Follow-Up:  No       Discharge recommendations:  (no further ST recommended)   Barriers to Discharge:  None    Time Tracking:     SLP Treatment Date:   07/29/19  Speech Start Time:  1215  Speech Stop Time:  1228     Speech Total Time (min):  13 min    Billable Minutes: Eval Swallow and Oral Function 13    Anthony Machado CCC-SLP  Speech-Language Pathology  Pager: [phone]   07/29/2019

## 2019-07-29 NOTE — H&P (VIEW-ONLY)
[facility]  Gastroenterology  Consult Note    Patient Name: Kayy Espinal  MRN: [id number]  Admission Date: 7/28/2019  Hospital Length of Stay: 0 days  Code Status: Full Code   Attending Provider: Holly Montiel MD   Consulting Provider: Gagandeep Scott MD  Primary Care Physician: Enrique Dos Santos MD  Principal Problem:Encephalopathy, metabolic    Inpatient consult to Gastroenterology  Consult performed by: Gagandeep Scott MD  Consult ordered by: Alex Douglas MD        Subjective:     HPI:  63 year old female with a history of HTN, DM Type 2, GERD, CVA with residual right sided weakness, and cecal mass on who GI is being consulted for a colonoscopy to further evaluate cecal mass.    Patient seen this morning with no family at bedside and unable to provide any history as she is minimally verbal. Based on chart review it seems that in June she was admitted for a low hemoglobin. This prompted an EGD which was normal. She then had a CT abdomen/pelvis which showed a cecal mass with liver metastasis, however family opted for an outpatient colonoscopy (IP colonoscopy was offered to make sure patient was not lost to follow up). She presented to an OSH (brought in by her family) due to worsening forgetfulness, decrease appetite, and weight loss. There she had a CTH (which showed a small acute petechial hemorrhage of the right inferomedial cerebellum of uncertain etiology, no vasogenic edema associated, and old infarcts of the left frontal lobe) and a CT A/P (which showed the cecal mass with extensive liver metastasis-unchanged from prior CT in June 2019). Due to the acute findings on the CTH she was transferred to [facility] for Neurology/Neurosurgery evaluation. At [facility] VS and Hemoglobin remain stable with no overt signs of bleeding.            Past Medical History:   Diagnosis Date    Asthma     Chronic anemia     Diabetes mellitus     Hypertension     Stroke        Past Surgical  History:   Procedure Laterality Date    ESOPHAGOGASTRODUODENOSCOPY (EGD) N/A 6/12/2019    Performed by Wilber Duarte MD at [facility] ENDO       Review of patient's allergies indicates:  No Known Allergies  Family History     Problem Relation (Age of Onset)    Diabetes Mother    Hypertension Mother        Tobacco Use    Smoking status: Never Smoker    Smokeless tobacco: Never Used   Substance and Sexual Activity    Alcohol use: Not Currently    Drug use: Not Currently    Sexual activity: Not Currently     Review of Systems   Unable to perform ROS: Mental status change     Objective:     Vital Signs (Most Recent):  Temp: 98.3 °F (36.8 °C) (07/29/19 0319)  Pulse: 92 (07/29/19 0319)  Resp: 18 (07/29/19 0319)  BP: 137/84 (07/29/19 0319)  SpO2: 97 % (07/29/19 0319) Vital Signs (24h Range):  Temp:  [97.4 °F (36.3 °C)-98.4 °F (36.9 °C)] 98.3 °F (36.8 °C)  Pulse:  [78-96] 92  Resp:  [16-18] 18  SpO2:  [97 %-100 %] 97 %  BP: (131-182)/(69-88) 137/84     Weight: 61.8 kg (136 lb 3.9 oz) (07/28/19 1911)  Body mass index is 22.67 kg/m².      Intake/Output Summary (Last 24 hours) at 7/29/2019 0736  Last data filed at 7/29/2019 0400  Gross per 24 hour   Intake --   Output 200 ml   Net -200 ml       Lines/Drains/Airways     Peripheral Intravenous Line                 Peripheral IV - Single Lumen 07/28/19 1954 20 G Right Forearm less than 1 day                Physical Exam   Constitutional: She is oriented to person, place, and time. No distress.   HENT:   Head: Normocephalic and atraumatic.   Eyes: No scleral icterus.   Cardiovascular: Normal rate and regular rhythm.   Pulmonary/Chest: Effort normal and breath sounds normal.   Abdominal: Soft. Bowel sounds are normal.   Musculoskeletal: She exhibits no edema.   Neurological: She is alert and oriented to person, place, and time.   Skin: She is not diaphoretic.   Nursing note and vitals reviewed.      Significant Labs:  CBC:   Recent Labs   Lab 07/28/19 1951   WBC 11.68   HGB  10.2*   HCT 35.1*   *     CMP:   Recent Labs   Lab 07/29/19  0428   GLU 96   CALCIUM 8.9   ALBUMIN 2.0*   PROT 6.4      K 3.6   CO2 26   CL 99   BUN 8   CREATININE 0.7   ALKPHOS 126   ALT 6*   AST 30   BILITOT 0.6     Coagulation:   Recent Labs   Lab 07/28/19  2115   INR 1.0       Significant Imaging:  Imaging results within the past 24 hours have been reviewed.    Assessment/Plan:     Cecal neoplasm  63 year old female with a history of HTN, DM Type 2, GERD, CVA with residual right sided weakness, and cecal mass on who GI is being consulted for a colonoscopy to further evaluate cecal mass.    In June 2019 patient was advised to obtain an IP colonoscopy however family opted for OP workup. Currently admitted with further overall decline and a new infarct. We can proceed with a colonoscopy with biopsies once workup for infract is completed.    Recommendations:  --Clear liquid diet tomorrow and NPO after MN for colonoscopy on Wednesday (planning on Wednesday as we want to be certain that all workup for acute infarct has been completed and patient is OK to be sedated)  --Daily CMP and CBC  --Further recommendations after procedure              Thank you for your consult. I will follow-up with patient. Please contact us if you have any additional questions.    Gagandeep Scott M.D.  Gastroenterology Fellow, PGY-VI  Pager: [phone]  [facility]

## 2019-07-29 NOTE — ASSESSMENT & PLAN NOTE
63 y.o. female with CVA (with residual R sided weakness), HTN, T2DM, GERD, cecal mass, who presents as transfer from [facility] ED for neurosurgery evaluation of petechial hemorrhage of R cerebellum. Per family has had forgetfullness, dec appetite and weight loss, urinary frequency and diarrhea, now resolved. Oriented only to self / place, disoriented to situation, no new FND (residual R sided weakness from prior CVA).     On admit:  - TSH wnl  - WBC 11.6, Hb 10.2, Plt 506, INR 1.0, Na 136, K 3.1, lactate 1.9  - UDS negative.  - UA with 13 WBCs, nitrite pos; denies symptoms but per family has had urinary freq and AMS  - patient was HDS and afebrile.       - MRI brain 7/29: Small enhancing lesion within the cerebellum on the right corresponding to abnormality on prior CT head, concern for metastatic lesion  -Given history of cecal mass, likely source, GI plans on doing c-scope Wednesday for evaluation   - Neurosurgery consulted  - On rocephin 1 g for UTI  - repeat UA with improvement, blood cultures no growth to date  - PT / OT evaluating

## 2019-07-29 NOTE — PLAN OF CARE
Problem: Adult Inpatient Plan of Care  Goal: Plan of Care Review  Outcome: Ongoing (interventions implemented as appropriate)  Uneventful shift. Pt admitted to floor as transfer from [facility] ED. Family at bs at time of transfer. Pt disoriented to time and situation. Pt extremely weak to R side from previous CVA. Pt iv abx regimen maintained. Pt aware of NPO status. Cxr and MRI need completing. BSCC placed in room. Pt has remained free from injury this shift. Bed in low locked position. Bed alarm on. Call light and personal belongings within reach. Side rails up x2. Nonskid socks in place. Pt instructed to call with any needs. Will continue to monitor.

## 2019-07-29 NOTE — SUBJECTIVE & OBJECTIVE
Interval History: Daughters at bedside, feel mentation has improved from admit but still not at baseline. Denies urinary frequency, denies abdominal pain, passed bedside swallow with nurse.     Review of Systems   Constitutional: Negative for fever.   Respiratory: Negative for shortness of breath and wheezing.    Cardiovascular: Negative for chest pain and leg swelling.   Gastrointestinal: Negative for abdominal pain, constipation, diarrhea and vomiting.   Genitourinary: Negative for hematuria.   Skin: Negative for rash.   Neurological: Positive for facial asymmetry. Negative for dizziness and headaches.     Objective:     Vital Signs (Most Recent):  Temp: 98.6 °F (37 °C) (07/29/19 1515)  Pulse: 105 (07/29/19 1515)  Resp: 18 (07/29/19 1515)  BP: (!) 116/59 (07/29/19 1515)  SpO2: (!) 94 % (07/29/19 1515) Vital Signs (24h Range):  Temp:  [97.4 °F (36.3 °C)-98.6 °F (37 °C)] 98.6 °F (37 °C)  Pulse:  [] 105  Resp:  [16-18] 18  SpO2:  [94 %-100 %] 94 %  BP: (116-182)/(59-88) 116/59     Weight: 61.8 kg (136 lb 3.9 oz)  Body mass index is 22.67 kg/m².    Intake/Output Summary (Last 24 hours) at 7/29/2019 1525  Last data filed at 7/29/2019 1000  Gross per 24 hour   Intake 180 ml   Output 450 ml   Net -270 ml      Physical Exam   Constitutional: No distress.   In bed, residual R-sided weakness from prior CVA   HENT:   Head: Normocephalic and atraumatic.   Eyes: Conjunctivae and EOM are normal.   Neck: Normal range of motion. Neck supple.   Cardiovascular: Normal rate.   Murmur heard.  Pulmonary/Chest: Effort normal and breath sounds normal. No respiratory distress. She has no wheezes. She exhibits no tenderness.   Abdominal: Soft. She exhibits no distension. There is no tenderness. There is no rebound and no guarding.   Musculoskeletal: Normal range of motion. She exhibits no edema.   Lymphadenopathy:     She has no cervical adenopathy.   Neurological: She is alert.   Oriented to location in hospital, unsure of which  one. Unclear if oriented to time/person. Recognizes daughters at bedside and answers questions appropriately.   Skin: Skin is warm and dry. No rash noted. She is not diaphoretic.   Nursing note and vitals reviewed.      Significant Labs:   BMP:   Recent Labs   Lab 07/29/19  0428   GLU 96      K 3.6   CL 99   CO2 26   BUN 8   CREATININE 0.7   CALCIUM 8.9   MG 1.6     CBC:   Recent Labs   Lab 07/28/19  1951 07/29/19  0816   WBC 11.68 9.05   HGB 10.2* 8.5*   HCT 35.1* 29.7*   * 484*     Urine Culture: No results for input(s): LABURIN in the last 48 hours.  Urine Studies:   Recent Labs   Lab 07/28/19  2242 07/29/19  1052   COLORU Yellow Yellow   APPEARANCEUA Clear Clear   PHUR 7.0 7.0   SPECGRAV <=1.005* 1.030   PROTEINUA Trace* Negative   GLUCUA Negative Negative   KETONESU Negative Negative   BILIRUBINUA Negative Negative   OCCULTUA Negative Negative   NITRITE Positive* Positive*   UROBILINOGEN 2.0-3.0*  --    LEUKOCYTESUR Trace* 1+*   RBCUA  --  1   WBCUA 13* 7*   BACTERIA Few* Rare   SQUAMEPITHEL 7 1       Significant Imaging: I have reviewed all pertinent imaging results/findings within the past 24 hours.

## 2019-07-29 NOTE — ASSESSMENT & PLAN NOTE
INTRACEREBRAL HEMORRHAGE OF CEREBELLUM    63 y.o. female with CVA (with residual R sided weakness), HTN, T2DM, GERD, cecal mass, who presents as transfer from [facility] ED for neurosurgery evaluation of petechial hemorrhage of R cerebellum. Per family has had forgetfullness, dec appetite and weight loss, urinary frequency and diarrhea, now resolved. Oriented only to self / place, disoriented to situation, no new FND (residual R sided weakness from prior CVA).   - DDx include CVA, endocrine, infectious, pharm, seizure  - TSH wnl  - WBC 11.6, Hb 10.2, Plt 506, INR 1.0, Na 136, K 3.1, lactate 1.9  - UDS pending  - UA with 13 WBCs, nitrite pos; denies symptoms but per family has had urinary freq and AMS  - patient was HDS and afebrile.     Plan:   - NSGY consulted  - NPO  - SLP eval  - Start rocephin for possible UTI  - repeat UA, urine culture, blood cultures

## 2019-07-29 NOTE — PLAN OF CARE
Problem: Occupational Therapy Goal  Goal: Occupational Therapy Goal  Goals to be met by: 8/9/19    Patient will increase functional independence with ADLs by performing:    UE Dressing with Contact Guard Assistance.  LE Dressing with Contact Guard Assistance an AD as needed.  Grooming while standing at sink with Contact Guard Assistance.  Toileting from toilet with Stand-by assistance for hygiene and clothing management.   Stand pivot transfers with Stand-by Assistance to reach stable surface.  Toilet transfer to toilet with Stand-by Assistance.      Outcome: Ongoing (interventions implemented as appropriate)  OT POC implemented. OT recommending inpatient rehab to increase pt's functional independence to return to her life roles and routines.Pt will be seen by OT 4X/wk to build strength and endurance for ADLs/IADLs.    Comments: Andreina Guzman OTR/L

## 2019-07-29 NOTE — PT/OT/SLP EVAL
Occupational Therapy   Evaluation    Name: Kayy Espinal  MRN: [id number]  Admitting Diagnosis:  Encephalopathy, metabolic      Recommendations:     Discharge Recommendations: rehabilitation facility  Discharge Equipment Recommendations:  (TBD)  Barriers to discharge:  None    Assessment:     Kayy Espinal is a 63 y.o. female with a medical diagnosis of Encephalopathy, metabolic.  She presents with the following erformance deficits affecting function: weakness, impaired self care skills, decreased upper extremity function, decreased lower extremity function, impaired functional mobilty, impaired balance, decreased safety awareness. Pt displayed RUE/LE weakness due to prior CVA affecting her pace and quality of movements during functional activities. Pt with a left facial droop speaking with as soft tone; limited verbalizations noted. Pt followed multi- step commands without any confusion noted. Pt completed bed mobility with CGA for sit<>stand and min A for transfer to bedside commode due to BLE weakness. PTA, pt I in all ADLs with use of 4 prong cane for mobility while living alone. Due to pt's PLOF and living environment, OT is recommending inpatient rehab pending pt's medical readiness. Pt will benefit from skilled OT 4x/wk for ADL training, strength building, and therapeutic activity to return to PLOF.    Rehab Prognosis: Good; patient would benefit from acute skilled OT services to address these deficits and reach maximum level of function.       Plan:     Patient to be seen 4 x/week to address the above listed problems via self-care/home management, therapeutic activities, therapeutic exercises, neuromuscular re-education  · Plan of Care Expires:    · Plan of Care Reviewed with: patient, daughter    Subjective     Chief Complaint: Lack of sleep over the past few days  Patient/Family Comments/goals: Pt and daughter agreeable to OT POC.     Occupational Profile:  Living Environment: Pt lives alone in a [facility] with  1 LEXY. Bathroom set up: Tub shower combination with shower chair  Previous level of function: I in all ADLs; Mod I for mobility with use of 4 prong cane   Falls: 1 within the last few months due to LOBs   Roles and Routines: Mother, homemaker   Equipment Used at Home:  wheelchair, cane, straight, shower chair  Assistance upon Discharge: Pt will have assistance from children upon discharge. One daughter stays next door; however, she works during the day.     Pain/Comfort:  · Pain Rating 1: 0/10  · Pain Rating Post-Intervention 1: 0/10    Patients cultural, spiritual, Orthodoxy conflicts given the current situation: no    Objective:     Communicated with: RN prior to session.  Patient found HOB elevated with peripheral IV upon OT entry to room.    General Precautions: Standard, fall   Orthopedic Precautions:N/A   Braces: N/A     Occupational Performance:    Bed Mobility:    · Patient completed Rolling/Turning to Left with  stand by assistance  · Patient completed Rolling/Turning to Right with stand by assistance  · Patient completed Scooting/Bridging with minimum assistance in supine for posterior positioning in bed   · Patient completed Supine to Sit with contact guard assistance  · Patient completed Sit to Supine with contact guard assistance    Functional Mobility/Transfers:  · Patient completed Sit <> Stand Transfer from bed with minimum assistance  with  no assistive device and posterior facilitation with RLE knee blocking ; CGA from commode   · Patient completed Toilet Transfer Stand Pivot technique with minimum assistance with  bedside commode to facilitate safe transfer with verbal cuing provided for hand placement     Activities of Daily Living:  · Feeding: Set up assistance: Grasping of cup located on R side with LUE, bring to mouth and drink, intact LUE fine motor skills noted with pt's ability to use grasp pills from a flat surface   · Toileting: stand by assistance wiping and clothing management in  sitting     Cognitive/Visual Perceptual:  Cognitive/Psychosocial Skills:     -       Follows Commands/attention:Follows multistep  commands  -       Communication: clear; soft spoken with limited verbalizations   -       Memory: not formally assessed  -       Safety awareness/insight to disability: intact   -       Mood/Affect/Coping skills/emotional control: Flat affect and Lethargic  Visual/Perceptual:      -Intact  tracking in R and L visual fields    Physical Exam:  Balance:    -       Sitting EOB ~5 minutes during medicine administration: SBA   Standing: CGA-min A with no AD  Postural examination/scapula alignment:    -       Rounded shoulders  Skin integrity: Visible skin intact  Edema:  None noted  Sensation:    -       Intact  Upper Extremity Range of Motion:     -       Right Upper Extremity: No active movement with pain at full range passively  -       Left Upper Extremity: WFL  Upper Extremity Strength:    -       Right Upper Extremity: NT due to no active ROM  -       Left Upper Extremity: WFL   Strength:    -       Right Upper Extremity: Not able to fully flex fingers   -       Left Upper Extremity: WFL  Fine Motor Coordination:    -       Intact LUE; impaired RUE due to no active movements of digits     AMPAC 6 Click ADL:  AMPAC Total Score: 17    Treatment & Education:  - Role of OT/ OT POC  - Self care safety/ independence  - Functional transfer/ mobility safety  - Bed mobility safety  - Importance of sitting UIC   - Importance of participation in therapy   - Importance of bimanual usage during functional activities for increased muscle activation  Education:    Patient left HOB elevated with all lines intact, call button in reach, RN notified and RN and daughter present    GOALS:   Multidisciplinary Problems     Occupational Therapy Goals        Problem: Occupational Therapy Goal    Goal Priority Disciplines Outcome Interventions   Occupational Therapy Goal     OT, PT/OT Ongoing (interventions  implemented as appropriate)    Description:  Goals to be met by: 8/9/19    Patient will increase functional independence with ADLs by performing:    UE Dressing with Contact Guard Assistance.  LE Dressing with Contact Guard Assistance an AD as needed.  Grooming while standing at sink with Contact Guard Assistance.  Toileting from toilet with Stand-by assistance for hygiene and clothing management.   Stand pivot transfers with Stand-by Assistance to reach stable surface.  Toilet transfer to toilet with Stand-by Assistance.                        History:     Past Medical History:   Diagnosis Date    Asthma     Chronic anemia     Diabetes mellitus     Hypertension     Stroke        Past Surgical History:   Procedure Laterality Date    ESOPHAGOGASTRODUODENOSCOPY (EGD) N/A 6/12/2019    Performed by Wilber Duarte MD at [facility] ENDO       Time Tracking:     OT Date of Treatment: 07/29/19  OT Start Time: 1043  OT Stop Time: 1105  OT Total Time (min): 22 min    Billable Minutes:Evaluation 10  Therapeutic Activity 12    Andreina Guzman, OT  7/29/2019

## 2019-07-29 NOTE — CONSULTS
[facility]  Gastroenterology  Consult Note    Patient Name: Kayy Espinal  MRN: [id number]  Admission Date: 7/28/2019  Hospital Length of Stay: 0 days  Code Status: Full Code   Attending Provider: Holly Montiel MD   Consulting Provider: Gagandeep Scott MD  Primary Care Physician: Enrique Dos Santos MD  Principal Problem:Encephalopathy, metabolic    Inpatient consult to Gastroenterology  Consult performed by: Gagandeep Scott MD  Consult ordered by: Alex Douglas MD        Subjective:     HPI:  63 year old female with a history of HTN, DM Type 2, GERD, CVA with residual right sided weakness, and cecal mass on who GI is being consulted for a colonoscopy to further evaluate cecal mass.    Patient seen this morning with no family at bedside and unable to provide any history as she is minimally verbal. Based on chart review it seems that in June she was admitted for a low hemoglobin. This prompted an EGD which was normal. She then had a CT abdomen/pelvis which showed a cecal mass with liver metastasis, however family opted for an outpatient colonoscopy (IP colonoscopy was offered to make sure patient was not lost to follow up). She presented to an OSH (brought in by her family) due to worsening forgetfulness, decrease appetite, and weight loss. There she had a CTH (which showed a small acute petechial hemorrhage of the right inferomedial cerebellum of uncertain etiology, no vasogenic edema associated, and old infarcts of the left frontal lobe) and a CT A/P (which showed the cecal mass with extensive liver metastasis-unchanged from prior CT in June 2019). Due to the acute findings on the CTH she was transferred to [facility] for Neurology/Neurosurgery evaluation. At [facility] VS and Hemoglobin remain stable with no overt signs of bleeding.            Past Medical History:   Diagnosis Date    Asthma     Chronic anemia     Diabetes mellitus     Hypertension     Stroke        Past Surgical  History:   Procedure Laterality Date    ESOPHAGOGASTRODUODENOSCOPY (EGD) N/A 6/12/2019    Performed by Wilber Duarte MD at [facility] ENDO       Review of patient's allergies indicates:  No Known Allergies  Family History     Problem Relation (Age of Onset)    Diabetes Mother    Hypertension Mother        Tobacco Use    Smoking status: Never Smoker    Smokeless tobacco: Never Used   Substance and Sexual Activity    Alcohol use: Not Currently    Drug use: Not Currently    Sexual activity: Not Currently     Review of Systems   Unable to perform ROS: Mental status change     Objective:     Vital Signs (Most Recent):  Temp: 98.3 °F (36.8 °C) (07/29/19 0319)  Pulse: 92 (07/29/19 0319)  Resp: 18 (07/29/19 0319)  BP: 137/84 (07/29/19 0319)  SpO2: 97 % (07/29/19 0319) Vital Signs (24h Range):  Temp:  [97.4 °F (36.3 °C)-98.4 °F (36.9 °C)] 98.3 °F (36.8 °C)  Pulse:  [78-96] 92  Resp:  [16-18] 18  SpO2:  [97 %-100 %] 97 %  BP: (131-182)/(69-88) 137/84     Weight: 61.8 kg (136 lb 3.9 oz) (07/28/19 1911)  Body mass index is 22.67 kg/m².      Intake/Output Summary (Last 24 hours) at 7/29/2019 0736  Last data filed at 7/29/2019 0400  Gross per 24 hour   Intake --   Output 200 ml   Net -200 ml       Lines/Drains/Airways     Peripheral Intravenous Line                 Peripheral IV - Single Lumen 07/28/19 1954 20 G Right Forearm less than 1 day                Physical Exam   Constitutional: She is oriented to person, place, and time. No distress.   HENT:   Head: Normocephalic and atraumatic.   Eyes: No scleral icterus.   Cardiovascular: Normal rate and regular rhythm.   Pulmonary/Chest: Effort normal and breath sounds normal.   Abdominal: Soft. Bowel sounds are normal.   Musculoskeletal: She exhibits no edema.   Neurological: She is alert and oriented to person, place, and time.   Skin: She is not diaphoretic.   Nursing note and vitals reviewed.      Significant Labs:  CBC:   Recent Labs   Lab 07/28/19 1951   WBC 11.68   HGB  10.2*   HCT 35.1*   *     CMP:   Recent Labs   Lab 07/29/19  0428   GLU 96   CALCIUM 8.9   ALBUMIN 2.0*   PROT 6.4      K 3.6   CO2 26   CL 99   BUN 8   CREATININE 0.7   ALKPHOS 126   ALT 6*   AST 30   BILITOT 0.6     Coagulation:   Recent Labs   Lab 07/28/19  2115   INR 1.0       Significant Imaging:  Imaging results within the past 24 hours have been reviewed.    Assessment/Plan:     Cecal neoplasm  63 year old female with a history of HTN, DM Type 2, GERD, CVA with residual right sided weakness, and cecal mass on who GI is being consulted for a colonoscopy to further evaluate cecal mass.    In June 2019 patient was advised to obtain an IP colonoscopy however family opted for OP workup. Currently admitted with further overall decline and a new infarct. We can proceed with a colonoscopy with biopsies once workup for infract is completed.    Recommendations:  --Clear liquid diet tomorrow and NPO after MN for colonoscopy on Wednesday (planning on Wednesday as we want to be certain that all workup for acute infarct has been completed and patient is OK to be sedated)  --Daily CMP and CBC  --Further recommendations after procedure              Thank you for your consult. I will follow-up with patient. Please contact us if you have any additional questions.    Gagandeep Scott M.D.  Gastroenterology Fellow, PGY-VI  Pager: [phone]  [facility]

## 2019-07-29 NOTE — ED TRIAGE NOTES
Pt reports to ED via personal transport.  Family members report while checking on patient over past several days they have noticed the patient has been very forgetful has decreased appetite, and weight loss x several weeks.  Family member reports freq urination and diarrhea over past 2-3 days however denies any diarrhea today. Family member also reports pt is not speaking normal.  PMH of stroke with right sided deficits.  Pt ambulated with help of 2 RNs from wheelchair to stretcher, pt able to bear weight on both legs. Pt denies falls, trauma, vomiting and dizziness, SOB and CP.  Pt lying in stretcher, respirations even and unlabored, eyes open spontaneously.  NAD noted, AAOx4, answering questions appropriately.  Pt placed on BP cycling, pulse ox, and cardiac monitoring q30min.  Bed locked and in lowest position, SRx2 up, call light within reach.

## 2019-07-29 NOTE — PROGRESS NOTES
[facility] Hospital Medicine  Progress Note    Patient Name: Kayy Espinal  MRN: [id number]  Patient Class: IP- Inpatient   Admission Date: 7/28/2019  Length of Stay: 0 days  Attending Physician: Holly Montiel MD  Primary Care Provider: Enrique Dos Santos MD    [facility] Medicine Team: [facility] 2 Genevieve Patel MD    Subjective:     Principal Problem:Encephalopathy, metabolic      HPI:  Kayy Espinal is a 63 y.o. female with CVA (with residual R sided weakness), HTN, T2DM, GERD, cecal mass, who presents as transfer from [facility] ED for neurosurgery evaluation of petechial hemorrhage of R cerebellum. Patient appears confused about why she is in hospital; per chart review, patient presented to [facility] ED with family yesterday evening who stated that over the past 2 weeks patient has been more forgetful, with decreased appetite and significant weight loss, and does not seem to be speaking or verbalizing as she normally does. In addition, they noted frequent urination and diarrhea for 2-3 days, which reportedly resolved yesterday. She notes no further episodes today. Patient lives alone, but daughters (3) help with her care at times as they live in her neighborhood and check on her frequently. They reportedly deny any recent falls. Of note, patient was recently admitted 6/10/19-6/13/19 after she presented to ED requesting a medication refill and was found to be severely anemic (Hb 4), and received 3 u pRBCs. 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 Patient was offered inpatient colonoscopy (due to concern that patient might get lost to follow up) for further evaluation but patient deferred to outpatient. Patient denies abdominal pain. She does not appear  to have followed up.     On presentation to osh ED, CTH was obtained d/t concern for metastasis which was negative for mets and acute infarct, but did show small acute petechial hemorrhage of the right inferomedial cerebellum of uncertain etiology, with no associated vasogenic edema. Labs with WBC 11.6, Hb 10.2, Plt 506, INR 1.0, Na 136, K 3.1, lactate 1.9, TSH 2.6, UA with 13 WBCs, nitrite pos; patient was HDS and afebrile. CT a/p was then obtained, which was unchanged from prior. Decision was made to transfer patient to [facility] for neurosurgery evaluation.     Overview/Hospital Course:  Daughters at bedside feel pt's mentation is improved from admit. Started on Rocephin 1g for UTI. Ammonia wnl. Brain MRI with concern for metastasis. GI planning on C-scope Wednesday for biopsy and evaluation of cecal mass.     Interval History: Daughters at bedside, feel mentation has improved from admit but still not at baseline. Denies urinary frequency, denies abdominal pain, passed bedside swallow with nurse.     Review of Systems   Constitutional: Negative for fever.   Respiratory: Negative for shortness of breath and wheezing.    Cardiovascular: Negative for chest pain and leg swelling.   Gastrointestinal: Negative for abdominal pain, constipation, diarrhea and vomiting.   Genitourinary: Negative for hematuria.   Skin: Negative for rash.   Neurological: Positive for facial asymmetry. Negative for dizziness and headaches.     Objective:     Vital Signs (Most Recent):  Temp: 98.6 °F (37 °C) (07/29/19 1515)  Pulse: 105 (07/29/19 1515)  Resp: 18 (07/29/19 1515)  BP: (!) 116/59 (07/29/19 1515)  SpO2: (!) 94 % (07/29/19 1515) Vital Signs (24h Range):  Temp:  [97.4 °F (36.3 °C)-98.6 °F (37 °C)] 98.6 °F (37 °C)  Pulse:  [] 105  Resp:  [16-18] 18  SpO2:  [94 %-100 %] 94 %  BP: (116-182)/(59-88) 116/59     Weight: 61.8 kg (136 lb 3.9 oz)  Body mass index is 22.67 kg/m².    Intake/Output Summary (Last 24 hours) at 7/29/2019 1525  Last  data filed at 7/29/2019 1000  Gross per 24 hour   Intake 180 ml   Output 450 ml   Net -270 ml      Physical Exam   Constitutional: No distress.   In bed, residual R-sided weakness from prior CVA   HENT:   Head: Normocephalic and atraumatic.   Eyes: Conjunctivae and EOM are normal.   Neck: Normal range of motion. Neck supple.   Cardiovascular: Normal rate.   Murmur heard.  Pulmonary/Chest: Effort normal and breath sounds normal. No respiratory distress. She has no wheezes. She exhibits no tenderness.   Abdominal: Soft. She exhibits no distension. There is no tenderness. There is no rebound and no guarding.   Musculoskeletal: Normal range of motion. She exhibits no edema.   Lymphadenopathy:     She has no cervical adenopathy.   Neurological: She is alert.   Oriented to location in hospital, unsure of which one. Unclear if oriented to time/person. Recognizes daughters at bedside and answers questions appropriately.   Skin: Skin is warm and dry. No rash noted. She is not diaphoretic.   Nursing note and vitals reviewed.      Significant Labs:   BMP:   Recent Labs   Lab 07/29/19  0428   GLU 96      K 3.6   CL 99   CO2 26   BUN 8   CREATININE 0.7   CALCIUM 8.9   MG 1.6     CBC:   Recent Labs   Lab 07/28/19  1951 07/29/19  0816   WBC 11.68 9.05   HGB 10.2* 8.5*   HCT 35.1* 29.7*   * 484*     Urine Culture: No results for input(s): LABURIN in the last 48 hours.  Urine Studies:   Recent Labs   Lab 07/28/19  2242 07/29/19  1052   COLORU Yellow Yellow   APPEARANCEUA Clear Clear   PHUR 7.0 7.0   SPECGRAV <=1.005* 1.030   PROTEINUA Trace* Negative   GLUCUA Negative Negative   KETONESU Negative Negative   BILIRUBINUA Negative Negative   OCCULTUA Negative Negative   NITRITE Positive* Positive*   UROBILINOGEN 2.0-3.0*  --    LEUKOCYTESUR Trace* 1+*   RBCUA  --  1   WBCUA 13* 7*   BACTERIA Few* Rare   SQUAMEPITHEL 7 1       Significant Imaging: I have reviewed all pertinent imaging results/findings within the past 24  hours.      Assessment/Plan:      * Encephalopathy, metabolic    63 y.o. female with CVA (with residual R sided weakness), HTN, T2DM, GERD, cecal mass, who presents as transfer from [facility] ED for neurosurgery evaluation of petechial hemorrhage of R cerebellum. Per family has had forgetfullness, dec appetite and weight loss, urinary frequency and diarrhea, now resolved. Oriented only to self / place, disoriented to situation, no new FND (residual R sided weakness from prior CVA).     On admit:  - TSH wnl  - WBC 11.6, Hb 10.2, Plt 506, INR 1.0, Na 136, K 3.1, lactate 1.9  - UDS negative.  - UA with 13 WBCs, nitrite pos; denies symptoms but per family has had urinary freq and AMS  - patient was HDS and afebrile.       - MRI brain 7/29: Small enhancing lesion within the cerebellum on the right corresponding to abnormality on prior CT head, concern for metastatic lesion  -Given history of cecal mass, likely source, GI plans on doing c-scope Wednesday for evaluation   - Neurosurgery consulted  - On rocephin 1 g for UTI  - repeat UA with improvement, blood cultures no growth to date  - PT / OT evaluating    Cecal neoplasm  Recent diagnosis  (admitted 6/10/19-6/13/19) with anemia (Hb 4); EGD was unremarkable; CT a/p done which showed a bulky soft tissue mass seen in the cecum with irregular borders highly suspicious for cecal carcinoma along with enlarged liver containing numerous diffuse complex hypodensities highly suspicious for liver metastasis.     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 Has history of CVA with residual right-sided weakness.     -NS consulted  -MRI brain 7/29 with possible  mets, low concern for repeat CVA      Essential hypertension  Resume norvasc  Resume losartan        VTE Risk Mitigation (From admission, onward)        Ordered     Place sequential compression device  Until discontinued      07/29/19 0217     IP VTE HIGH RISK PATIENT  Once      07/29/19 0217                Genevieve Patel MD  Department of Hospital Medicine   [facility]

## 2019-07-29 NOTE — PLAN OF CARE
"  Please call extension [phone] upon patient arrival to floor for Hospital Medicine admit team assignment and for additional admit orders. If patient is coming from another [facility] facility please also call [phone] to inform the admit team/office that patient has arrived from the [facility] facility to the floor so patient can be evaluated.    Outside Transfer Acceptance Note    Transferring Physician or Mid Level Provider/Speciality: Dr. Ireland / ED    Accepting Physician: Myke Galan     Consulting physician: Dr. Briggs/Neurosurgery     Date of Acceptance: 07/29/2019     Code Status: Full    Transferring Facility/Hospital: [facility] ED     Reason for Transfer to [facility]: Neurosurgery evaluation of small acute petechial hemorrhage of R cerebellum on CT.     Report from Transferring Physician or Mid-Level provider/Hospital course:     63-year-old female with history HTN, DM2, CVA,  Anemia, recent diagnosis of cecal mass with liver mets presented today with her family who says that over the past 2 weeks patient has been more forgetful, decreased appetite, significant weight loss, and does not seem to be speaking in verbalizing as she normally does.  In addition they noticed frequent urination and diarrhea for 2 or 3 days which resolved yesterday.  She has not had any frequency or diarrhea today.  She lives alone, but daughters help with her care at times and live in her neighborhood and check on her fairly frequently.  They deny any recent falls.  No vomiting. No dizziness.  When family is not around patient, I asked patient had any abuse or shaking episodes.  She denied this.    /74   Pulse 88   Temp 98.4 °F (36.9 °C) (Oral)   Resp 16   Ht 5' 5" (1.651 m)   Wt 61.8 kg (136 lb 3.9 oz)   SpO2 99%   BMI 22.67 kg/m²     Recent Results (from the past 24 hour(s))   CBC auto differential    Collection Time: 07/28/19  7:51 PM   Result Value Ref Range    WBC 11.68 3.90 - 12.70 K/uL    RBC 4.71 4.00 - 5.40 M/uL    Hemoglobin " 10.2 (L) 12.0 - 16.0 g/dL    Hematocrit 35.1 (L) 37.0 - 48.5 %    Mean Corpuscular Volume 75 (L) 82 - 98 fL    Mean Corpuscular Hemoglobin 21.7 (L) 27.0 - 31.0 pg    Mean Corpuscular Hemoglobin Conc 29.1 (L) 32.0 - 36.0 g/dL    RDW 25.3 (H) 11.5 - 14.5 %    Platelets 506 (H) 150 - 350 K/uL    MPV 9.4 9.2 - 12.9 fL    Immature Granulocytes 0.6 (H) 0.0 - 0.5 %    Gran # (ANC) 8.5 (H) 1.8 - 7.7 K/uL    Immature Grans (Abs) 0.07 (H) 0.00 - 0.04 K/uL    Lymph # 1.8 1.0 - 4.8 K/uL    Mono # 1.2 (H) 0.3 - 1.0 K/uL    Eos # 0.1 0.0 - 0.5 K/uL    Baso # 0.07 0.00 - 0.20 K/uL    nRBC 0 0 /100 WBC    Gran% 72.3 38.0 - 73.0 %    Lymph% 15.5 (L) 18.0 - 48.0 %    Mono% 10.2 4.0 - 15.0 %    Eosinophil% 0.8 0.0 - 8.0 %    Basophil% 0.6 0.0 - 1.9 %    Aniso Slight     Hypo Occasional     Schistocytes Present     Differential Method Automated    Comprehensive metabolic panel    Collection Time: 07/28/19  7:51 PM   Result Value Ref Range    Sodium 136 136 - 145 mmol/L    Potassium 3.1 (L) 3.5 - 5.1 mmol/L    Chloride 95 95 - 110 mmol/L    CO2 29 23 - 29 mmol/L    Glucose 124 (H) 70 - 110 mg/dL    BUN, Bld 11 8 - 23 mg/dL    Creatinine 0.8 0.5 - 1.4 mg/dL    Calcium 9.8 8.7 - 10.5 mg/dL    Total Protein 8.1 6.0 - 8.4 g/dL    Albumin 2.6 (L) 3.5 - 5.2 g/dL    Total Bilirubin 1.0 0.1 - 1.0 mg/dL    Alkaline Phosphatase 143 (H) 55 - 135 U/L    AST 34 10 - 40 U/L    ALT 7 (L) 10 - 44 U/L    Anion Gap 12 8 - 16 mmol/L    eGFR if African American >60 >60 mL/min/1.73 m^2    eGFR if non African American >60 >60 mL/min/1.73 m^2   TSH    Collection Time: 07/28/19  7:51 PM   Result Value Ref Range    TSH 2.623 0.400 - 4.000 uIU/mL   POCT glucose    Collection Time: 07/28/19  7:53 PM   Result Value Ref Range    POCT Glucose 133 (H) 70 - 110 mg/dL   Protime-INR    Collection Time: 07/28/19  9:15 PM   Result Value Ref Range    Prothrombin Time 10.8 9.0 - 12.5 sec    INR 1.0 0.8 - 1.2   Lactic acid, plasma    Collection Time: 07/28/19  9:15 PM    Result Value Ref Range    Lactate (Lactic Acid) 1.9 0.5 - 2.2 mmol/L   Urinalysis    Collection Time: 07/28/19 10:42 PM   Result Value Ref Range    Specimen UA Urine, Clean Catch     Color, UA Yellow Yellow, Straw, Meaghan    Appearance, UA Clear Clear    pH, UA 7.0 5.0 - 8.0    Specific Gravity, UA <=1.005 (A) 1.005 - 1.030    Protein, UA Trace (A) Negative    Glucose, UA Negative Negative    Ketones, UA Negative Negative    Bilirubin (UA) Negative Negative    Occult Blood UA Negative Negative    Nitrite, UA Positive (A) Negative    Urobilinogen, UA 2.0-3.0 (A) <2.0 EU/dL    Leukocytes, UA Trace (A) Negative   Urinalysis Microscopic    Collection Time: 07/28/19 10:42 PM   Result Value Ref Range    WBC, UA 13 (H) 0 - 5 /hpf    WBC Clumps, UA Rare None-Rare    Bacteria Few (A) None-Occ /hpf    Squam Epithel, UA 7 /hpf    Non-Squam Epith 2 (A) <1/hpf /hpf    Microscopic Comment SEE COMMENT        MEDS GIVEN IN ED:  cc, KCL 40 meq     CT head : Small acute petechial hemorrhage of the right inferomedial cerebellum of uncertain etiology.  No vasogenic edema associated.    Old infarcts of the left frontal lobe.    No acute infarct.    CT abdomen pelvis w contrast: Cecal mass similar with 06/12/2019 consistent with adenocarcinoma.  Multiple confluent liver metastasis unchanged.  No bowel dilation or acute inflammatory process found.    Normal appendix.  No urolithiasis or hydronephrosis.    Gallstones in an otherwise normal gallbladder without duct dilation.        To do list upon patient arrival:   -MRI brain w wo contrast  -consult neurosurgery   -consult GI/CRS for evaluation/biopsy of cecal mass         Myke Galan DO   - PFC   Attending Staff Physician   [facility]

## 2019-07-29 NOTE — H&P
[facility] Hospital Medicine  History & Physical    Patient Name: Kayy Espinal  MRN: [id number]  Admission Date: 7/28/2019  Attending Physician: Holly Montiel MD   Primary Care Provider: Enrique Dos Santos MD    [facility] Medicine Team: [facility] HOSP MED 2 Alex Douglas MD     Patient information was obtained from patient, past medical records and ER records.     Subjective:     Principal Problem:Encephalopathy, metabolic    Chief Complaint:   Chief Complaint   Patient presents with    Polyuria     and AMS yesterday, pt has no complaint today, family states major wt loss since discharged from hospital 3 wks ago        HPI: Kayy Espinal is a 63 y.o. female with CVA (with residual R sided weakness), HTN, T2DM, GERD, cecal mass, who presents as transfer from [facility] ED for neurosurgery evaluation of petechial hemorrhage of R cerebellum. Patient appears confused about why she is in hospital; per chart review, patient presented to [facility] ED with family yesterday evening who stated that over the past 2 weeks patient has been more forgetful, with decreased appetite and significant weight loss, and does not seem to be speaking or verbalizing as she normally does. In addition, they noted frequent urination and diarrhea for 2-3 days, which reportedly resolved yesterday. She notes no further episodes today. Patient lives alone, but daughters (3) help with her care at times as they live in her neighborhood and check on her frequently. They reportedly deny any recent falls. Of note, patient was recently admitted 6/10/19-6/13/19 after she presented to ED requesting a medication refill and was found to be severely anemic (Hb 4), and received 3 u pRBCs. EGD was unremarkable and she then had CT a/p done which showed a bulky soft tissue mass seen in the cecum with irregular borders highly suspicious for cecal carcinoma along with enlarged liver containing numerous diffuse complex hypodensities  highly suspicious for liver metastasis. Patient was offered inpatient colonoscopy (due to concern that patient might get lost to follow up) for further evaluation but patient deferred to outpatient. Patient denies abdominal pain. She does not appear to have followed up.     On presentation to [facility] ED, CTH was obtained d/t concern for metastasis which was negative for mets and acute infarct, but did show small acute petechial hemorrhage of the right inferomedial cerebellum of uncertain etiology, with no associated vasogenic edema. Labs with WBC 11.6, Hb 10.2, Plt 506, INR 1.0, Na 136, K 3.1, lactate 1.9, TSH 2.6, UA with 13 WBCs, nitrite pos; patient was HDS and afebrile. CT a/p was then obtained, which was unchanged from prior. Decision [place] [facility] [place]     Past Medical History:   Diagnosis Date    Asthma     Chronic anemia     Diabetes mellitus     Hypertension     Stroke        Past Surgical History:   Procedure Laterality Date    ESOPHAGOGASTRODUODENOSCOPY (EGD) N/A 6/12/2019    Performed by Wilber Duarte MD at [facility] ENDO       Review of patient's allergies indicates:  No Known Allergies    No current facility-administered medications on file prior to encounter.      Current Outpatient Medications on File Prior to Encounter   Medication Sig    AMLODIPINE/VALSARTAN (EXFORGE ORAL) Take by mouth.    atorvastatin (LIPITOR) 40 MG tablet Take 1 tablet (40 mg total) by mouth once daily.    BACLOFEN, BULK, MISC by Misc.(Non-Drug; Combo Route) route.    esomeprazole (NEXIUM) 20 MG capsule Take 1 capsule (20 mg total) by mouth before breakfast.    ferrous sulfate (FEOSOL) 325 mg (65 mg iron) Tab tablet Take 1 tablet (325 mg total) by mouth daily with breakfast.    hydrocodone-acetaminophen 5-325mg (NORCO) 5-325 mg per tablet Take 1 tablet by mouth every 4 (four) hours as needed for Pain.    insulin glargine (LANTUS) 100 unit/mL injection Inject into the skin  every evening.    losartan (COZAAR) 50 MG tablet Take 1 tablet (50 mg total) by mouth once daily.    metformin (GLUCOPHAGE) 500 MG tablet Take 500 mg by mouth 2 (two) times daily with meals.    MIRTAZAPINE ORAL Take by mouth.    POTASSIUM CHLORIDE MISC by Misc.(Non-Drug; Combo Route) route.    SIMVASTATIN ORAL Take by mouth.     Family History     Problem Relation (Age of Onset)    Diabetes Mother    Hypertension Mother        Tobacco Use    Smoking status: Never Smoker    Smokeless tobacco: Never Used   Substance and Sexual Activity    Alcohol use: Not Currently    Drug use: Not Currently    Sexual activity: Not Currently     Review of Systems   Constitutional: Negative for chills and fever.   Respiratory: Negative for cough, chest tightness and shortness of breath.    Cardiovascular: Negative for chest pain.   Gastrointestinal: Positive for diarrhea and nausea. Negative for abdominal pain, blood in stool, constipation and vomiting.   Genitourinary: Positive for frequency. Negative for difficulty urinating, dysuria and urgency.   Musculoskeletal: Negative for arthralgias and myalgias.   Skin: Negative for color change and pallor.   Neurological: Negative for dizziness, light-headedness and headaches.   Psychiatric/Behavioral: Positive for confusion. Negative for agitation.     Objective:     Vital Signs (Most Recent):  Temp: 97.4 °F (36.3 °C) (07/29/19 0111)  Pulse: 83 (07/29/19 0111)  Resp: 18 (07/29/19 0111)  BP: (!) 154/77 (07/29/19 0111)  SpO2: 100 % (07/29/19 0111) Vital Signs (24h Range):  Temp:  [97.4 °F (36.3 °C)-98.4 °F (36.9 °C)] 97.4 °F (36.3 °C)  Pulse:  [78-96] 83  Resp:  [16-18] 18  SpO2:  [99 %-100 %] 100 %  BP: (131-182)/(69-88) 154/77     Weight: 61.8 kg (136 lb 3.9 oz)  Body mass index is 22.67 kg/m².    Physical Exam   Constitutional: She appears well-developed and well-nourished. No distress.   HENT:   Head: Normocephalic and atraumatic.   Eyes: Conjunctivae are normal. No scleral  icterus.   Neck: Normal range of motion. Neck supple.   Cardiovascular: Normal rate, regular rhythm and normal heart sounds. Exam reveals no gallop and no friction rub.   No murmur heard.  Pulmonary/Chest: Effort normal and breath sounds normal.   Abdominal: Soft. Bowel sounds are normal. She exhibits no distension. There is no tenderness.   Musculoskeletal: Normal range of motion. She exhibits no edema.   Neurological: She is alert.   Oriented to self and place  Residual RUE weakness and facial droop from prior CVA, no new FND   Skin: Skin is warm and dry. She is not diaphoretic.            Assessment/Plan:     * Encephalopathy, metabolic  INTRACEREBRAL HEMORRHAGE OF CEREBELLUM    63 y.o. female with CVA (with residual R sided weakness), HTN, T2DM, GERD, cecal mass, who presents as transfer from [facility] [place], operated by [facility] ED for neurosurgery evaluation of petechial hemorrhage of R cerebellum. Per family has had forgetfullness, dec appetite and weight loss, urinary frequency and diarrhea, now resolved. Oriented only to self / place, disoriented to situation, no new FND (residual R sided weakness from prior CVA).   - DDx include CVA, endocrine, infectious, pharm, seizure  - TSH wnl  - WBC 11.6, Hb 10.2, Plt 506, INR 1.0, Na 136, K 3.1, lactate 1.9  - UDS pending  - UA with 13 WBCs, nitrite pos; denies symptoms but per family has had urinary freq and AMS  - patient was HDS and afebrile.     Plan:   - MRI brain w/ w/o  - NSGY consulted  - NPO  - SLP eval  - Start rocephin for possible UTI  - repeat UA, urine culture, blood cultures  - PT / OT    Cecal neoplasm  Recent diagnosis  (admitted 6/10/19-6/13/19) with anemia (Hb 4); EGD was unremarkable; CT a/p done which showed a bulky soft tissue mass seen in the cecum with irregular borders highly suspicious for cecal carcinoma along with enlarged liver containing numerous diffuse complex hypodensities highly suspicious for liver metastasis.   - did not follow up  - GI consult for evaluation  / biopsy    Essential hypertension  Resume norvasc  Resume losartan      VTE Risk Mitigation (From admission, onward)        Ordered     Place sequential compression device  Until discontinued      07/29/19 0217     IP VTE HIGH RISK PATIENT  Once      07/29/19 0217             Alex Douglas MD  Department of Hospital Medicine   [facility]

## 2019-07-30 ENCOUNTER — ANESTHESIA EVENT (OUTPATIENT)
Dept: ENDOSCOPY | Facility: HOSPITAL | Age: 64
DRG: 064 | End: 2019-07-30
Payer: MEDICARE

## 2019-07-30 PROBLEM — Z74.09 IMPAIRED FUNCTIONAL MOBILITY AND ENDURANCE: Status: ACTIVE | Noted: 2019-07-30

## 2019-07-30 LAB
ALBUMIN SERPL BCP-MCNC: 1.9 G/DL (ref 3.5–5.2)
ALP SERPL-CCNC: 132 U/L (ref 55–135)
ALT SERPL W/O P-5'-P-CCNC: 6 U/L (ref 10–44)
ANION GAP SERPL CALC-SCNC: 12 MMOL/L (ref 8–16)
AST SERPL-CCNC: 35 U/L (ref 10–40)
BASOPHILS # BLD AUTO: 0.04 K/UL (ref 0–0.2)
BASOPHILS NFR BLD: 0.4 % (ref 0–1.9)
BILIRUB SERPL-MCNC: 0.5 MG/DL (ref 0.1–1)
BUN SERPL-MCNC: 9 MG/DL (ref 8–23)
CALCIUM SERPL-MCNC: 8.6 MG/DL (ref 8.7–10.5)
CHLORIDE SERPL-SCNC: 96 MMOL/L (ref 95–110)
CO2 SERPL-SCNC: 27 MMOL/L (ref 23–29)
CREAT SERPL-MCNC: 0.7 MG/DL (ref 0.5–1.4)
DIFFERENTIAL METHOD: ABNORMAL
EOSINOPHIL # BLD AUTO: 0.1 K/UL (ref 0–0.5)
EOSINOPHIL NFR BLD: 1.3 % (ref 0–8)
ERYTHROCYTE [DISTWIDTH] IN BLOOD BY AUTOMATED COUNT: 25.3 % (ref 11.5–14.5)
EST. GFR  (AFRICAN AMERICAN): >60 ML/MIN/1.73 M^2
EST. GFR  (NON AFRICAN AMERICAN): >60 ML/MIN/1.73 M^2
GLUCOSE SERPL-MCNC: 106 MG/DL (ref 70–110)
HCT VFR BLD AUTO: 30.2 % (ref 37–48.5)
HGB BLD-MCNC: 8.7 G/DL (ref 12–16)
IMM GRANULOCYTES # BLD AUTO: 0.07 K/UL (ref 0–0.04)
IMM GRANULOCYTES NFR BLD AUTO: 0.6 % (ref 0–0.5)
LYMPHOCYTES # BLD AUTO: 1.4 K/UL (ref 1–4.8)
LYMPHOCYTES NFR BLD: 12.5 % (ref 18–48)
MAGNESIUM SERPL-MCNC: 1.4 MG/DL (ref 1.6–2.6)
MCH RBC QN AUTO: 21.9 PG (ref 27–31)
MCHC RBC AUTO-ENTMCNC: 28.8 G/DL (ref 32–36)
MCV RBC AUTO: 76 FL (ref 82–98)
MONOCYTES # BLD AUTO: 1.3 K/UL (ref 0.3–1)
MONOCYTES NFR BLD: 11.7 % (ref 4–15)
NEUTROPHILS # BLD AUTO: 8.1 K/UL (ref 1.8–7.7)
NEUTROPHILS NFR BLD: 73.5 % (ref 38–73)
NRBC BLD-RTO: 0 /100 WBC
PHOSPHATE SERPL-MCNC: 2.4 MG/DL (ref 2.7–4.5)
PLATELET # BLD AUTO: 450 K/UL (ref 150–350)
PMV BLD AUTO: 9.9 FL (ref 9.2–12.9)
POCT GLUCOSE: 109 MG/DL (ref 70–110)
POCT GLUCOSE: 110 MG/DL (ref 70–110)
POCT GLUCOSE: 112 MG/DL (ref 70–110)
POCT GLUCOSE: 119 MG/DL (ref 70–110)
POTASSIUM SERPL-SCNC: 3.5 MMOL/L (ref 3.5–5.1)
PROT SERPL-MCNC: 6.3 G/DL (ref 6–8.4)
RBC # BLD AUTO: 3.98 M/UL (ref 4–5.4)
SODIUM SERPL-SCNC: 135 MMOL/L (ref 136–145)
WBC # BLD AUTO: 11.07 K/UL (ref 3.9–12.7)

## 2019-07-30 PROCEDURE — 63600175 PHARM REV CODE 636 W HCPCS: Performed by: STUDENT IN AN ORGANIZED HEALTH CARE EDUCATION/TRAINING PROGRAM

## 2019-07-30 PROCEDURE — 97530 THERAPEUTIC ACTIVITIES: CPT

## 2019-07-30 PROCEDURE — 83735 ASSAY OF MAGNESIUM: CPT

## 2019-07-30 PROCEDURE — 80053 COMPREHEN METABOLIC PANEL: CPT

## 2019-07-30 PROCEDURE — 85025 COMPLETE CBC W/AUTO DIFF WBC: CPT

## 2019-07-30 PROCEDURE — 97110 THERAPEUTIC EXERCISES: CPT

## 2019-07-30 PROCEDURE — 20600001 HC STEP DOWN PRIVATE ROOM

## 2019-07-30 PROCEDURE — 25000003 PHARM REV CODE 250: Performed by: STUDENT IN AN ORGANIZED HEALTH CARE EDUCATION/TRAINING PROGRAM

## 2019-07-30 PROCEDURE — 99222 PR INITIAL HOSPITAL CARE,LEVL II: ICD-10-PCS | Mod: ,,, | Performed by: NURSE PRACTITIONER

## 2019-07-30 PROCEDURE — 97161 PT EVAL LOW COMPLEX 20 MIN: CPT

## 2019-07-30 PROCEDURE — 99232 PR SUBSEQUENT HOSPITAL CARE,LEVL II: ICD-10-PCS | Mod: ,,, | Performed by: HOSPITALIST

## 2019-07-30 PROCEDURE — 99232 SBSQ HOSP IP/OBS MODERATE 35: CPT | Mod: ,,, | Performed by: HOSPITALIST

## 2019-07-30 PROCEDURE — 36415 COLL VENOUS BLD VENIPUNCTURE: CPT

## 2019-07-30 PROCEDURE — 99222 1ST HOSP IP/OBS MODERATE 55: CPT | Mod: ,,, | Performed by: NURSE PRACTITIONER

## 2019-07-30 PROCEDURE — 84100 ASSAY OF PHOSPHORUS: CPT

## 2019-07-30 RX ORDER — ONDANSETRON 2 MG/ML
4 INJECTION INTRAMUSCULAR; INTRAVENOUS ONCE AS NEEDED
Status: COMPLETED | OUTPATIENT
Start: 2019-07-30 | End: 2019-07-30

## 2019-07-30 RX ORDER — POLYETHYLENE GLYCOL 3350, SODIUM SULFATE ANHYDROUS, SODIUM BICARBONATE, SODIUM CHLORIDE, POTASSIUM CHLORIDE 236; 22.74; 6.74; 5.86; 2.97 G/4L; G/4L; G/4L; G/4L; G/4L
4000 POWDER, FOR SOLUTION ORAL ONCE
Status: COMPLETED | OUTPATIENT
Start: 2019-07-30 | End: 2019-07-30

## 2019-07-30 RX ORDER — ONDANSETRON 2 MG/ML
4 INJECTION INTRAMUSCULAR; INTRAVENOUS ONCE AS NEEDED
Status: COMPLETED | OUTPATIENT
Start: 2019-07-30 | End: 2019-07-31

## 2019-07-30 RX ADMIN — ONDANSETRON 4 MG: 2 INJECTION INTRAMUSCULAR; INTRAVENOUS at 07:07

## 2019-07-30 RX ADMIN — AMLODIPINE BESYLATE 10 MG: 10 TABLET ORAL at 09:07

## 2019-07-30 RX ADMIN — PANTOPRAZOLE SODIUM 40 MG: 40 TABLET, DELAYED RELEASE ORAL at 09:07

## 2019-07-30 RX ADMIN — ATORVASTATIN CALCIUM 40 MG: 20 TABLET, FILM COATED ORAL at 09:07

## 2019-07-30 RX ADMIN — POLYETHYLENE GLYCOL 3350, SODIUM SULFATE ANHYDROUS, SODIUM BICARBONATE, SODIUM CHLORIDE, POTASSIUM CHLORIDE 4000 ML: 236; 22.74; 6.74; 5.86; 2.97 POWDER, FOR SOLUTION ORAL at 06:07

## 2019-07-30 RX ADMIN — LOSARTAN POTASSIUM 50 MG: 25 TABLET, FILM COATED ORAL at 09:07

## 2019-07-30 RX ADMIN — CEFTRIAXONE SODIUM 1 G: 1 INJECTION, POWDER, FOR SOLUTION INTRAMUSCULAR; INTRAVENOUS at 03:07

## 2019-07-30 NOTE — PT/OT/SLP EVAL
"Physical Therapy Evaluation    Patient Name:  Kayy Espinal   MRN:  [id number]    Recommendations:     Discharge Recommendations:  rehabilitation facility   Discharge Equipment Recommendations: (tbd)   Barriers to discharge: Decreased caregiver support    Assessment:     Kayy Espinal is a 63 y.o. female admitted with a medical diagnosis of Encephalopathy, metabolic.  She presents with the following impairments/functional limitations:  weakness, impaired self care skills, impaired balance, impaired endurance, impaired functional mobilty, gait instability, decreased lower extremity function, decreased upper extremity function, decreased safety awareness.  Tolerated session c c/o weakness and fatigue.  Performed functional mobility c min - mod A on this date.  Pt able to take few steps c HHAx1 and mod A - demo L lateral and posterior lean and unsteadiness.  Pt safe to transfer to bedside chair c assistance of 1x person (stand pivot).  Pt would benefit from continued skilled acute PT 3x/wk to improve functional mobility.  Recommending pt receive PT services in rehab setting following d/c from hospital once medically cleared.      Rehab Prognosis: Good; patient would benefit from acute skilled PT services to address these deficits and reach maximum level of function.    Recent Surgery: Procedure(s) (LRB):  COLONOSCOPY (N/A)      Plan:     During this hospitalization, patient to be seen 3 x/week to address the identified rehab impairments via gait training, therapeutic activities, therapeutic exercises, neuromuscular re-education and progress toward the following goals:    · Plan of Care Expires:  08/24/19    Subjective     Chief Complaint: fatigue  Patient/Family Comments/goals: "This arm doesn't work." - re RUE  Pain/Comfort:  · Pain Rating 1: 0/10    Patients cultural, spiritual, Mosque conflicts given the current situation: no    Living Environment:  Pt reports living alone in 2SH c 0STE and living quarters on " 1st floor.  Pt reports daughter is able to assist but works.  Prior to admission, patients level of function was independent c ADLs and using QC for mobility.  Equipment used at home: wheelchair, walker, rolling, shower chair, cane, quad, cane, straight.  DME owned (not currently used): none.  Upon discharge, patient will have assistance from family (limited).    Objective:     Communicated with RN and OT prior to session.  Patient found up in chair with peripheral IV  upon PT entry to room.    General Precautions: Standard, fall   Orthopedic Precautions:N/A   Braces: N/A     Exams:  · Cognitive Exam:  Patient is oriented to Person, Place, Time and Situation  · RLE ROM: WFL  · RLE Strength: grossly 1/5  · LLE ROM: WFL  · LLE Strength: grossly 4/5    Functional Mobility:  · Transfers:     · Sit to Stand:  minimum assistance with hand-held assist  · Gait: 10ft c HHAx1 mod A  · Unsteadiness, shuffled gait, L lateral lean, posterior lean, c/o fatigue  · Balance: sitting (S); standing (min-mod A)      Therapeutic Activities and Exercises:  Pt educated on: PT role/POC; safety c mobility; benefits of OOB activities; performing therex; d/c recs - v/u      AM-PAC 6 CLICK MOBILITY  Total Score:16     Patient left up in chair with all lines intact, call button in reach and RN notified.    GOALS:   Multidisciplinary Problems     Physical Therapy Goals        Problem: Physical Therapy Goal    Goal Priority Disciplines Outcome Goal Variances Interventions   Physical Therapy Goal     PT, PT/OT Ongoing (interventions implemented as appropriate)     Description:  Goals to be met by: 2019     Patient will increase functional independence with mobility by performin. Supine to sit with Modified [place]  2. Sit to supine with Modified [place]  3. Sit to stand transfer with Contact Guard Assistance  4. Bed to chair transfer with Contact Guard Assistance using Quad Cane  5. Gait  x 50 feet with Contact Guard  Assistance using Quad Cane.                       History:     Past Medical History:   Diagnosis Date    Asthma     Chronic anemia     Diabetes mellitus     Hypertension     Stroke        Past Surgical History:   Procedure Laterality Date    ESOPHAGOGASTRODUODENOSCOPY (EGD) N/A 6/12/2019    Performed by Wilber Duarte MD at [facility] ENDO       Time Tracking:     PT Received On: 07/30/19  PT Start Time: 1010     PT Stop Time: 1022  PT Total Time (min): 12 min     Billable Minutes: Evaluation 12 min      Gage Mendoza, PT  07/30/2019

## 2019-07-30 NOTE — HOSPITAL COURSE
07/29/2019: OT-Bed mobility SBA-CGA .  Sit to stand and transfers Migue. Feeding setupA. Toileting SBA. Passed bedside swallow evaluation.  SLP recommending regular diet and thin liquids.  7/30/19: 07/30/2019: Bed mobility CGA-Migue.  Sit to stand and transfers Migue.  Ambulated 10 ft ModA Unsteadiness, shuffled gait, L lateral lean, posterior lean, c/o fatigue. UBD Migue.  7/31/19: SLP diagnosis of Dysarthria and Cognitive-Linguistic Impairment.   08/02/2019: Bed mobility CGA-Migue .  Sit to stand Migue-ModA.  Ambulated 10 ft x 2 ModA.  SLP diagnosis of Cognitive-Linguistic Impairment

## 2019-07-30 NOTE — ASSESSMENT & PLAN NOTE
63 y.o. female with CVA (with residual R sided weakness), HTN, T2DM, GERD, cecal mass, who presents as transfer from [facility] ED for neurosurgery evaluation of petechial hemorrhage of R cerebellum. Per family has had forgetfullness, dec appetite and weight loss, urinary frequency and diarrhea, now resolved. Oriented only to self / place, disoriented to situation, no new FND (residual R sided weakness from prior CVA).     On admit:  - TSH wnl  - WBC 11.6, Hb 10.2, Plt 506, INR 1.0, Na 136, K 3.1, lactate 1.9  - UDS negative.  - UA with 13 WBCs, nitrite pos; denies symptoms but per family has had urinary freq and AMS  - patient was HDS and afebrile.       - MRI brain 7/29: Small enhancing lesion within the cerebellum on the right corresponding to abnormality on prior CT head, concern for metastatic lesion  -Given history of cecal mass, likely source, GI plans on doing c-scope Wednesday for evaluation   - Neurosurgery consulted  - Was on rocephin 1 g for UTI, discontinued on 7/30  -No further clinical symptoms,  repeat UA with improvement, blood cultures no growth to date

## 2019-07-30 NOTE — ASSESSMENT & PLAN NOTE
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     - MRI head 7/29 with concern for metastasis  - GI consulted, planning on c-scope 7/31 for evaluation   -Discussed with daughters at bedside, on board with GI eval prior to making any further treatment decisions at this time

## 2019-07-30 NOTE — SUBJECTIVE & OBJECTIVE
Past Medical History:   Diagnosis Date    Asthma     Chronic anemia     Diabetes mellitus     Hypertension     Stroke      Past Surgical History:   Procedure Laterality Date    ESOPHAGOGASTRODUODENOSCOPY (EGD) N/A 6/12/2019    Performed by Wilber Duarte MD at [facility] ENDO     Review of patient's allergies indicates:  No Known Allergies    Scheduled Medications:    amLODIPine  10 mg Oral Daily    atorvastatin  40 mg Oral Daily    losartan  50 mg Oral Daily    pantoprazole  40 mg Oral Daily    polyethylene glycol  4,000 mL Oral Once       PRN Medications: Dextrose 10% Bolus, Dextrose 10% Bolus, glucagon (human recombinant), glucose, glucose, insulin aspart U-100, pneumoc 13-raymond conj-dip cr(PF), sodium chloride 0.9%    Family History     Problem Relation (Age of Onset)    Diabetes Mother    Hypertension Mother        Tobacco Use    Smoking status: Never Smoker    Smokeless tobacco: Never Used   Substance and Sexual Activity    Alcohol use: Not Currently    Drug use: Not Currently    Sexual activity: Not Currently     Review of Systems   Reason unable to perform ROS: limited 2/2 cognition.   Constitutional: Positive for activity change.   Respiratory: Negative for cough and shortness of breath.    Cardiovascular: Negative for chest pain and palpitations.   Musculoskeletal: Positive for gait problem.   Neurological: Positive for facial asymmetry and weakness.   Psychiatric/Behavioral: Positive for confusion.     Objective:     Vital Signs (Most Recent):  Temp: 98.2 °F (36.8 °C) (07/30/19 0741)  Pulse: 93 (07/30/19 0741)  Resp: 16 (07/30/19 0741)  BP: (!) 147/80 (07/30/19 0741)  SpO2: (!) 94 % (07/30/19 0741)    Vital Signs (24h Range):  Temp:  [97.7 °F (36.5 °C)-98.6 °F (37 °C)] 98.2 °F (36.8 °C)  Pulse:  [] 93  Resp:  [16-20] 16  SpO2:  [94 %-98 %] 94 %  BP: (116-151)/(59-80) 147/80     Body mass index is 22.67 kg/m².    Physical Exam   Constitutional: She appears well-developed and well-nourished.    HENT:   Head: Normocephalic and atraumatic.   Eyes: Right eye exhibits no discharge. Left eye exhibits no discharge.   Neck: Neck supple.   Cardiovascular: Normal rate and intact distal pulses.   Pulmonary/Chest: Effort normal. No respiratory distress.   Abdominal: Soft. There is no tenderness.   Musculoskeletal: She exhibits no edema or deformity.   R sided weakness    Neurological: She is alert. She is disoriented. She exhibits abnormal muscle tone.   Follows commands   Facial asymmetry    Skin: Skin is warm and dry.   Psychiatric: She has a normal mood and affect. Her behavior is normal. Cognition and memory are impaired.   Vitals reviewed.         Diagnostic Results:   Labs: Reviewed  ECG: Reviewed  X-Ray: Reviewed  CT: Reviewed  MRI: Reviewed

## 2019-07-30 NOTE — ANESTHESIA PREPROCEDURE EVALUATION
[facility]  Anesthesia Pre-Operative Evaluation         Patient Name: Kayy Espinal  YOB: 1955  MRN: [id number]    SUBJECTIVE:     Pre-operative evaluation for Procedure(s) (LRB):  COLONOSCOPY (N/A)     07/30/2019    Kayy Espinal is a 63 y.o. female w/ a significant PMHx of CVA (with residual R sided weakness), HTN, T2DM, GERD, cecal mass, presented to [facility] as transfer from [facility] for eval of possible r cerebellum hemorrhage, MRI suggestive of metastatic lesion.    Patient now presents for the above procedure(s).      LDA:        Peripheral IV - Single Lumen 07/28/19 1954 20 G Right Forearm (Active)   Site Assessment Clean;Dry;Intact;No redness;No swelling 7/30/2019  7:40 AM   Line Status Saline locked 7/30/2019  7:40 AM   Dressing Status Clean;Dry;Intact 7/30/2019  7:40 AM   Site Change Due 08/01/19 7/30/2019  7:40 AM   Reason Not Rotated Not due 7/30/2019  7:40 AM   Number of days: 1       Prev airway: None documented.    Drips: None documented.      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      Current Outpatient Medications on File Prior to Encounter   Medication Sig Dispense Refill    atorvastatin (LIPITOR) 40 MG tablet Take 40 mg by mouth once daily.      esomeprazole (NEXIUM) 20 MG capsule Take 1 capsule (20 mg total) by mouth before breakfast. 30 capsule 11       Past Surgical History:   Procedure Laterality Date     ESOPHAGOGASTRODUODENOSCOPY (EGD) N/A 6/12/2019    Performed by Wilber Duarte MD at [facility] ENDO       Social History     Socioeconomic History    Marital status:      Spouse name: Not on file    Number of children: Not on file    Years of education: Not on file    Highest education level: Not on file   Occupational History    Not on file   Social Needs    Financial resource strain: Not on file    Food insecurity:     Worry: Not on file     Inability: Not on file    Transportation needs:     Medical: Not on file     Non-medical: Not on file   Tobacco Use    Smoking status: Never Smoker    Smokeless tobacco: Never Used   Substance and Sexual Activity    Alcohol use: Not Currently    Drug use: Not Currently    Sexual activity: Not Currently   Lifestyle    Physical activity:     Days per week: Not on file     Minutes per session: Not on file    Stress: Not on file   Relationships    Social connections:     Talks on phone: Not on file     Gets together: Not on file     Attends Gnosticist service: Not on file     Active member of club or organization: Not on file     Attends meetings of clubs or organizations: Not on file     Relationship status: Not on file   Other Topics Concern    Not on file   Social History Narrative    Not on file       OBJECTIVE:     Vital Signs Range (Last 24H):  Temp:  [36.5 °C (97.7 °F)-37 °C (98.6 °F)]   Pulse:  []   Resp:  [16-20]   BP: (116-151)/(59-80)   SpO2:  [94 %-98 %]       Significant Labs:  Lab Results   Component Value Date    WBC 11.07 07/30/2019    HGB 8.7 (L) 07/30/2019    HCT 30.2 (L) 07/30/2019     (H) 07/30/2019    ALT 6 (L) 07/30/2019    AST 35 07/30/2019     (L) 07/30/2019    K 3.5 07/30/2019    CL 96 07/30/2019    CREATININE 0.7 07/30/2019    BUN 9 07/30/2019    CO2 27 07/30/2019    TSH 2.623 07/28/2019    INR 1.0 07/28/2019    HGBA1C 4.9 07/29/2019       Diagnostic Studies: No relevant studies.    EKG:   Results for orders placed or  performed during the hospital encounter of 05/07/12   EKG 12-LEAD    Collection Time: 05/07/12  4:54 AM    Narrative    Test Reason : CP  Blood Pressure : / mmHG  Vent. Rate : 081 BPM     Atrial Rate : 081 BPM     P-R Int : 160 ms          QRS Dur : 080 ms      QT Int : 410 ms       P-R-T Axes : 026 -08 038 degrees     QTc Int : 476 ms    Normal sinus rhythm  Minimal voltage criteria for LVH, may be normal variant  Cannot rule out Anterior infarct (cited on or before 06-MAY-2012)  When compared with ECG of 06-MAY-2012 20:06,  No significant change was found  Confirmed by Emilia Vizcarra MD ([id number]) on 5/8/2012 7:10:25 PM    Referred By:  SELF REFERRED           Overread By: Emilia Vizcarra MD         2D ECHO:  No results found for this or any previous visit.      ASSESSMENT/PLAN:         Anesthesia Evaluation    I have reviewed the Patient Summary Reports.    I have reviewed the Nursing Notes.   I have reviewed the Medications.     Review of Systems  Anesthesia Hx:  No problems with previous Anesthesia  History of prior surgery of interest to airway management or planning: Previous anesthesia: MAC  EGD, 6/12/19 with MAC.  Denies Family Hx of Anesthesia complications.   Denies Personal Hx of Anesthesia complications.   Social:  Non-Smoker    Cardiovascular:   Hypertension    Pulmonary:   Asthma    Neurological:   CVA    Endocrine:   Diabetes        Physical Exam  General:  Well nourished    Airway/Jaw/Neck:  Airway Findings: Mouth Opening: Normal Tongue: Normal  General Airway Assessment: Adult  Mallampati: I  TM Distance: Normal, at least 6 cm  Jaw/Neck Findings:  Neck ROM: Normal ROM       Chest/Lungs:  Chest/Lungs Findings: Clear to auscultation     Heart/Vascular:  Heart Findings: Rate: Normal  Rhythm: Regular Rhythm        Mental Status:  Mental Status Findings:  Cooperative, Alert and Oriented         Anesthesia Plan  Type of Anesthesia, risks & benefits discussed:  Anesthesia Type:  MAC,  general  Patient's Preference:   Intra-op Monitoring Plan: standard ASA monitors  Intra-op Monitoring Plan Comments:   Post Op Pain Control Plan: multimodal analgesia and per primary service following discharge from PACU  Post Op Pain Control Plan Comments:   Induction:   IV  Beta Blocker:  Patient is not currently on a Beta-Blocker (No further documentation required).       Informed Consent: Patient representative understands risks and agrees with Anesthesia plan.  Questions answered. Anesthesia consent signed with patient representative.  ASA Score: 3     Day of Surgery Review of History & Physical:    H&P update referred to the surgeon.         Ready For Surgery From Anesthesia Perspective.

## 2019-07-30 NOTE — CONSULTS
[facility]  Physical Medicine & Rehab  Consult Note    Patient Name: Kayy Espinal  MRN: [id number]  Admission Date: 7/28/2019  Hospital Length of Stay: 1 days  Attending Physician: Holly Montiel MD     Inpatient consult to Physical Medicine & Rehabilitation  Consult performed by: Carolyne Hollingsworth NP  Consult requested by:  Holly Montiel MD    Reason for Consult:  assess rehabilitation needs  Consults  Subjective:     Principal Problem: Encephalopathy, metabolic     HPI: Kayy Espinal is a 63-year-old female with PMHx of HTN, DM2, GERD, anemia, stroke ( residual right hemiparesis),  and recent diagnosis of cecal mass on CT.  Patient presented to [facility] on 7/28 for AMS.  CTH revealed petechial hemorrhage of R cerebellum. Transferred to [facility] on 7/28 for further neurosurgical evaluation. MRI brain on 7/29 with concern for metastasis. GI consulted  with tentative plans for c-scope tomorrow.      Functional History: Patient lives in [facility] in a single story home with 1 step to enter.  Prior to admission, (I) with ADLs and Mod (I) for mobility with 4PC. DME: wheelchair, cane, straight, shower chair.     Hospital Course:   07/29/2019: OT-Bed mobility SBA-CGA .  Sit to stand and transfers Shady. Feeding setupA. Toileting SBA. Passed bedside swallow evaluation.  SLP recommending regular diet and thin liquids.  7/30/19: PT & cog eval pending.     Past Medical History:   Diagnosis Date    Asthma     Chronic anemia     Diabetes mellitus     Hypertension     Stroke      Past Surgical History:   Procedure Laterality Date    ESOPHAGOGASTRODUODENOSCOPY (EGD) N/A 6/12/2019    Performed by Wilber Duarte MD at [facility] ENDO     Review of patient's allergies indicates:  No Known Allergies    Scheduled Medications:    amLODIPine  10 mg Oral Daily    atorvastatin  40 mg Oral Daily    losartan  50 mg Oral Daily    pantoprazole  40 mg Oral Daily    polyethylene glycol  4,000 mL Oral Once        PRN Medications: Dextrose 10% Bolus, Dextrose 10% Bolus, glucagon (human recombinant), glucose, glucose, insulin aspart U-100, pneumoc 13-raymond conj-dip cr(PF), sodium chloride 0.9%    Family History     Problem Relation (Age of Onset)    Diabetes Mother    Hypertension Mother        Tobacco Use    Smoking status: Never Smoker    Smokeless tobacco: Never Used   Substance and Sexual Activity    Alcohol use: Not Currently    Drug use: Not Currently    Sexual activity: Not Currently     Review of Systems   Reason unable to perform ROS: limited 2/2 cognition.   Constitutional: Positive for activity change.   Respiratory: Negative for cough and shortness of breath.    Cardiovascular: Negative for chest pain and palpitations.   Musculoskeletal: Positive for gait problem.   Neurological: Positive for facial asymmetry and weakness.   Psychiatric/Behavioral: Positive for confusion.     Objective:     Vital Signs (Most Recent):  Temp: 98.2 °F (36.8 °C) (07/30/19 0741)  Pulse: 93 (07/30/19 0741)  Resp: 16 (07/30/19 0741)  BP: (!) 147/80 (07/30/19 0741)  SpO2: (!) 94 % (07/30/19 0741)    Vital Signs (24h Range):  Temp:  [97.7 °F (36.5 °C)-98.6 °F (37 °C)] 98.2 °F (36.8 °C)  Pulse:  [] 93  Resp:  [16-20] 16  SpO2:  [94 %-98 %] 94 %  BP: (116-151)/(59-80) 147/80     Body mass index is 22.67 kg/m².    Physical Exam   Constitutional: She appears well-developed and well-nourished.   HENT:   Head: Normocephalic and atraumatic.   Eyes: Right eye exhibits no discharge. Left eye exhibits no discharge.   Neck: Neck supple.   Cardiovascular: Normal rate and intact distal pulses.   Pulmonary/Chest: Effort normal. No respiratory distress.   Abdominal: Soft. There is no tenderness.   Musculoskeletal: She exhibits no edema or deformity.   R sided weakness    Neurological: She is alert. She is disoriented. She exhibits abnormal muscle tone.   Follows commands   Facial asymmetry    Skin: Skin is warm and dry.   Psychiatric: She  has a normal mood and affect. Her behavior is normal. Cognition and memory are impaired.   Vitals reviewed.       Diagnostic Results:   Labs: Reviewed  ECG: Reviewed  X-Ray: Reviewed  CT: Reviewed  MRI: Reviewed    Assessment/Plan:     * Encephalopathy, metabolic  -MRI brain revealed small enhancing lesion within the cerebellum on the right corresponding to abnormality on prior CT head, concern for metastatic lesion    See hospital course for functional, cognitive/speech/language, and nutrition/swallow status.      Recommendations  -  Encourage mobility, OOB in chair at least 3 hours per day, and early ambulation as appropriate   -  PT/OT evaluate and treat  -  SLP speech and cognitive evaluate and treat  -  Monitor sleep disturbances and establish consistent sleep-wake cycle  -  Monitor for bowel and bladder dysfunction  -  Monitor for shoulder pain, subluxation, & spasticity  -  Monitor for and prevent skin breakdown and pressure ulcers  · Early mobility, repositioning/weight shifting every 20-30 minutes when sitting, turn patient every 2 hours, proper mattress/overlay and chair cushioning, pressure relief/heel protector boots  -  Reviewed discharge options (IP rehab, SNF, HH therapy, and OP therapy)    Impaired functional mobility and endurance  Recommendations  -  Encourage mobility, OOB in chair at least 3 hours per day, and early ambulation as appropriate  -  PT/OT evaluate and treat  -  Pain management  -  Monitor for and prevent skin breakdown and pressure ulcers  · Early mobility, repositioning/weight shifting every 20-30 minutes when sitting, turn patient every 2 hours, proper mattress/overlay and chair cushioning, pressure relief/heel protector boots  -  DVT prophylaxis    -  Reviewed discharge options (IP rehab, SNF, HH therapy, and OP therapy)    Cecal neoplasm  -GI consulted  -plans for c-scope tomorrow    PT evaluation pending. Will follow progress and discuss with rehab team for post acute care/rehab  recommendation.      Thank you for your consult.     Carolyne Hollingsworth NP  Department of Physical Medicine & Rehab  [facility]

## 2019-07-30 NOTE — SUBJECTIVE & OBJECTIVE
Interval History: Pt much more communicative this AM, asking questions about colonoscopy and seems to have more insight on what is going on medically. Denies urinary symptoms. Denies fevers/chills/abdominal pain.     Review of Systems   Constitutional: Negative for chills, fatigue and fever.   Respiratory: Negative for cough, shortness of breath and wheezing.    Cardiovascular: Negative for chest pain and leg swelling.   Gastrointestinal: Negative for abdominal pain, constipation, diarrhea and vomiting.   Genitourinary: Negative for dysuria, frequency and hematuria.   Skin: Negative for rash.   Neurological: Negative for headaches.     Objective:     Vital Signs (Most Recent):  Temp: 98.3 °F (36.8 °C) (07/30/19 1118)  Pulse: 103 (07/30/19 1118)  Resp: 18 (07/30/19 1118)  BP: 123/65 (07/30/19 1118)  SpO2: 99 % (07/30/19 1118) Vital Signs (24h Range):  Temp:  [97.7 °F (36.5 °C)-98.6 °F (37 °C)] 98.3 °F (36.8 °C)  Pulse:  [] 103  Resp:  [16-20] 18  SpO2:  [94 %-99 %] 99 %  BP: (116-147)/(59-80) 123/65     Weight: 61.8 kg (136 lb 3.9 oz)  Body mass index is 22.67 kg/m².    Intake/Output Summary (Last 24 hours) at 7/30/2019 1313  Last data filed at 7/30/2019 1247  Gross per 24 hour   Intake --   Output 700 ml   Net -700 ml      Physical Exam   Constitutional: She is oriented to person, place, and time. She appears well-developed and well-nourished.   Awake in bed, watching TV   HENT:   Head: Normocephalic and atraumatic.   Eyes: EOM are normal.   Neck: Normal range of motion.   Cardiovascular: Normal rate.   Murmur heard.  Pulmonary/Chest: Effort normal and breath sounds normal. No respiratory distress. She has no wheezes. She exhibits no tenderness.   Abdominal: Soft. She exhibits no distension. There is no tenderness. There is no rebound and no guarding.   Musculoskeletal: She exhibits no edema.   Neurological: She is alert and oriented to person, place, and time.   Asking and answering questions appropriately.  Aware of situation medically.    Skin: Skin is warm and dry. No rash noted.   Nursing note and vitals reviewed.      Significant Labs:   CBC:   Recent Labs   Lab 07/28/19 1951 07/29/19  0816 07/30/19  0510   WBC 11.68 9.05 11.07   HGB 10.2* 8.5* 8.7*   HCT 35.1* 29.7* 30.2*   * 484* 450*     CMP:   Recent Labs   Lab 07/28/19 1951 07/29/19  0428 07/30/19  0510    136 135*   K 3.1* 3.6 3.5   CL 95 99 96   CO2 29 26 27   * 96 106   BUN 11 8 9   CREATININE 0.8 0.7 0.7   CALCIUM 9.8 8.9 8.6*   PROT 8.1 6.4 6.3   ALBUMIN 2.6* 2.0* 1.9*   BILITOT 1.0 0.6 0.5   ALKPHOS 143* 126 132   AST 34 30 35   ALT 7* 6* 6*   ANIONGAP 12 11 12   EGFRNONAA >60 >60.0 >60.0       Significant Imaging: I have reviewed all pertinent imaging results/findings within the past 24 hours.

## 2019-07-30 NOTE — PLAN OF CARE
SW spoke to patient regarding inpatient rehab. Patient stated that she did not want to go to any inpatient facility and I needed to spoke to her daughter. SW spoke to patient's daughter Crissy who stated that her mother did not want to go to inpatient rehab and would like home health. Patient's daughter requested hospital bed and wheelchair at discharge.     Elvin Chase LMSW  [facility]  [phone]

## 2019-07-30 NOTE — TREATMENT PLAN
GI Treatment Plan  07/30/2019  5:06 PM    Patient scheduled for colonoscopy tomorrow.  NPO after MN.  GL prep has been ordered.  Additional recommendations post procedure.    Gagandeep Scott M.D.  Gastroenterology Fellow, PGY-VI  Pager: [phone]  [facility]

## 2019-07-30 NOTE — HPI
Kayy Espinal is a 63-year-old female with PMHx of HTN, DM2, GERD, anemia, stroke ( residual right hemiparesis),  and recent diagnosis of cecal mass on CT (no follow up recorded).  Patient presented to [facility] on 7/28 for AMS.  CTH revealed petechial hemorrhage of R cerebellum. Transferred to [facility] on 7/28 for further neurosurgical evaluation. MRI brain on 7/29 with concern for metastasis. GI consulted with tentative plans for c-scope tomorrow.      Functional History: Patient lives in [facility] in a single story home with 1 step to enter.  Prior to admission, (I) with ADLs and Mod (I) for mobility with 4PC. DME: wheelchair, cane, straight, shower chair.

## 2019-07-30 NOTE — PLAN OF CARE
Problem: Adult Inpatient Plan of Care  Goal: Plan of Care Review  Outcome: Ongoing (interventions implemented as appropriate)  Patient oriented to person, situation, but not to time. Patient with slurred speech due to previous CVA. VSS, afebrile, and without injury. Fall precautions maintained. Family at bedside. Patient instructed on how to contact the nurse. Bed alarm in use. Patient seen by PT/OT. Patient on room air without distress; patient on clear liquids with poor appetite. No complaints of pain or nausea. Patient to receive golytely this evening to prep for colonoscopy tomorrow, Wednesday. Speech consult in. Questions and concerns have been addressed; will continue to monitor.

## 2019-07-30 NOTE — CONSULTS
Inpatient consult to Physical Medicine Rehab  Consult performed by: Carolyne Hollingsworth NP  Consult ordered by: Holly Montiel MD  Reason for consult: assess rehab needs        Reviewed patient history and current admission.  Rehab team following.  Full consult to follow.    CATRINA Hector, FNP-C  Physical Medicine & Rehabilitation   07/30/2019  Spectralink: [phone]

## 2019-07-30 NOTE — PLAN OF CARE
Problem: Physical Therapy Goal  Goal: Physical Therapy Goal  Goals to be met by: 2019     Patient will increase functional independence with mobility by performin. Supine to sit with Modified [place]  2. Sit to supine with Modified [place]  3. Sit to stand transfer with Contact Guard Assistance  4. Bed to chair transfer with Contact Guard Assistance using Quad Cane  5. Gait  x 50 feet with Contact Guard Assistance using Quad Cane.     Outcome: Ongoing (interventions implemented as appropriate)  Eval completed and POC established    Gage Mendoza PT,DPT  2019

## 2019-07-30 NOTE — PROGRESS NOTES
[facility] Hospital Medicine  Progress Note    Patient Name: Kayy Espinal  MRN: [id number]  Patient Class: IP- Inpatient   Admission Date: 7/28/2019  Length of Stay: 1 days  Attending Physician: Holly Montiel MD  Primary Care Provider: Enrique Dos Santos MD    [facility] Medicine Team: [facility] HOSP MED 2 Genevieve Patel MD    Subjective:     Principal Problem:Encephalopathy, metabolic      HPI:  Kayy Espinal is a 63 y.o. female with CVA (with residual R sided weakness), HTN, T2DM, GERD, cecal mass, who presents as transfer from [facility] ED for neurosurgery evaluation of petechial hemorrhage of R cerebellum. Patient appears confused about why she is in hospital; per chart review, patient presented to [facility] ED with family yesterday evening who stated that over the past 2 weeks patient has been more forgetful, with decreased appetite and significant weight loss, and does not seem to be speaking or verbalizing as she normally does. In addition, they noted frequent urination and diarrhea for 2-3 days, which reportedly resolved yesterday. She notes no further episodes today. Patient lives alone, but daughters (3) help with her care at times as they live in her neighborhood and check on her frequently. They reportedly deny any recent falls. Of note, patient was recently admitted 6/10/19-6/13/19 after she presented to ED requesting a medication refill and was found to be severely anemic (Hb 4), and received 3 u pRBCs. 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 Patient was offered inpatient colonoscopy (due to concern that patient might get lost to follow up) for further evaluation but patient deferred to outpatient. Patient denies abdominal pain. She does not appear  to have followed up.     On presentation to [facility] ED, CTH was obtained d/t concern for metastasis which was negative for mets and acute infarct, but did show small acute petechial hemorrhage of the right inferomedial cerebellum of uncertain etiology, with no associated vasogenic edema. Labs with WBC 11.6, Hb 10.2, Plt 506, INR 1.0, Na 136, K 3.1, lactate 1.9, TSH 2.6, UA with 13 WBCs, nitrite pos; patient was HDS and afebrile. CT a/p was then obtained, which was unchanged from prior. Decision was made to transfer patient to [facility] for neurosurgery evaluation.     Overview/Hospital Course:  Daughters at bedside feel pt's mentation is improved from admit. Started on Rocephin 1g for UTI. Ammonia wnl. Brain MRI with concern for metastasis. GI planning on C-scope Wednesday for biopsy and evaluation of cecal mass. Rosephin discontinued due to improvement on repeat urine culture and no further clinical symtoms. Likely will discharge tomorrow following colonoscopy with close outpatient follow-up with oncology.      Interval History: Pt much more communicative this AM, asking questions about colonoscopy and seems to have more insight on what is going on medically. Denies urinary symptoms. Denies fevers/chills/abdominal pain.     Review of Systems   Constitutional: Negative for chills, fatigue and fever.   Respiratory: Negative for cough, shortness of breath and wheezing.    Cardiovascular: Negative for chest pain and leg swelling.   Gastrointestinal: Negative for abdominal pain, constipation, diarrhea and vomiting.   Genitourinary: Negative for dysuria, frequency and hematuria.   Skin: Negative for rash.   Neurological: Negative for headaches.     Objective:     Vital Signs (Most Recent):  Temp: 98.3 °F (36.8 °C) (07/30/19 1118)  Pulse: 103 (07/30/19 1118)  Resp: 18 (07/30/19 1118)  BP: 123/65 (07/30/19 1118)  SpO2: 99 % (07/30/19 1118) Vital Signs (24h Range):  Temp:  [97.7 °F (36.5 °C)-98.6 °F (37 °C)] 98.3 °F (36.8 °C)  Pulse:   [] 103  Resp:  [16-20] 18  SpO2:  [94 %-99 %] 99 %  BP: (116-147)/(59-80) 123/65     Weight: 61.8 kg (136 lb 3.9 oz)  Body mass index is 22.67 kg/m².    Intake/Output Summary (Last 24 hours) at 7/30/2019 1313  Last data filed at 7/30/2019 1247  Gross per 24 hour   Intake --   Output 700 ml   Net -700 ml      Physical Exam   Constitutional: She is oriented to person, place, and time. She appears well-developed and well-nourished.   Awake in bed, watching TV   HENT:   Head: Normocephalic and atraumatic.   Eyes: EOM are normal.   Neck: Normal range of motion.   Cardiovascular: Normal rate.   Murmur heard.  Pulmonary/Chest: Effort normal and breath sounds normal. No respiratory distress. She has no wheezes. She exhibits no tenderness.   Abdominal: Soft. She exhibits no distension. There is no tenderness. There is no rebound and no guarding.   Musculoskeletal: She exhibits no edema.   Neurological: She is alert and oriented to person, place, and time.   Asking and answering questions appropriately. Aware of situation medically.    Skin: Skin is warm and dry. No rash noted.   Nursing note and vitals reviewed.      Significant Labs:   CBC:   Recent Labs   Lab 07/28/19 1951 07/29/19  0816 07/30/19  0510   WBC 11.68 9.05 11.07   HGB 10.2* 8.5* 8.7*   HCT 35.1* 29.7* 30.2*   * 484* 450*     CMP:   Recent Labs   Lab 07/28/19 1951 07/29/19  0428 07/30/19  0510    136 135*   K 3.1* 3.6 3.5   CL 95 99 96   CO2 29 26 27   * 96 106   BUN 11 8 9   CREATININE 0.8 0.7 0.7   CALCIUM 9.8 8.9 8.6*   PROT 8.1 6.4 6.3   ALBUMIN 2.6* 2.0* 1.9*   BILITOT 1.0 0.6 0.5   ALKPHOS 143* 126 132   AST 34 30 35   ALT 7* 6* 6*   ANIONGAP 12 11 12   EGFRNONAA >60 >60.0 >60.0       Significant Imaging: I have reviewed all pertinent imaging results/findings within the past 24 hours.      Assessment/Plan:      * Encephalopathy, metabolic    63 y.o. female with CVA (with residual R sided weakness), HTN, T2DM, GERD, cecal mass,  who presents as transfer from [facility] [place], operated by [facility] ED for neurosurgery evaluation of petechial hemorrhage of R cerebellum. Per family has had forgetfullness, dec appetite and weight loss, urinary frequency and diarrhea, now resolved. Oriented only to self / place, disoriented to situation, no new FND (residual R sided weakness from prior CVA).     On admit:  - TSH wnl  - WBC 11.6, Hb 10.2, Plt 506, INR 1.0, Na 136, K 3.1, lactate 1.9  - UDS negative.  - UA with 13 WBCs, nitrite pos; denies symptoms but per family has had urinary freq and AMS  - patient was HDS and afebrile.       - MRI brain 7/29: Small enhancing lesion within the cerebellum on the right corresponding to abnormality on prior CT head, concern for metastatic lesion  -Given history of cecal mass, likely source, GI plans on doing c-scope Wednesday for evaluation   - Neurosurgery consulted  - Was on rocephin 1 g for UTI, discontinued on 7/30  -No further clinical symptoms,  repeat UA with improvement, blood cultures no growth to date      Impaired functional mobility and endurance  PT/OT evaluating       Cecal neoplasm  Recent diagnosis  (admitted 6/10/19-6/13/19) with anemia (Hb 4); EGD was unremarkable; CT a/p done which showed a bulky soft tissue mass seen in the cecum with irregular borders highly suspicious for cecal carcinoma along with enlarged liver containing numerous diffuse complex hypodensities highly suspicious for liver metastasis.     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 Has history of CVA with residual right-sided weakness.     -NS consulted  -MRI brain 7/29 with possible mets, low concern for repeat CVA, likely progression  of cecal mass into brain  -Planning for colonoscopy 7/31 with GI for biopsy and cytology of mass, will need close oncology follow-up as outpatient       Essential hypertension  Resume home meds of amlodipine and losartan        VTE Risk Mitigation (From admission, onward)        Ordered     Place sequential compression device  Until discontinued      07/29/19 0217     IP VTE HIGH RISK PATIENT  Once      07/29/19 0217                Genevieve Patel MD  Department of Hospital Medicine   [facility]

## 2019-07-30 NOTE — ASSESSMENT & PLAN NOTE
-MRI brain revealed small enhancing lesion within the cerebellum on the right corresponding to abnormality on prior CT head, concern for metastatic lesion    See hospital course for functional, cognitive/speech/language, and nutrition/swallow status.      Recommendations  -  Encourage mobility, OOB in chair at least 3 hours per day, and early ambulation as appropriate   -  PT/OT evaluate and treat  -  SLP speech and cognitive evaluate and treat  -  Monitor sleep disturbances and establish consistent sleep-wake cycle  -  Monitor for bowel and bladder dysfunction  -  Monitor for shoulder pain, subluxation, & spasticity  -  Monitor for and prevent skin breakdown and pressure ulcers  · Early mobility, repositioning/weight shifting every 20-30 minutes when sitting, turn patient every 2 hours, proper mattress/overlay and chair cushioning, pressure relief/heel protector boots  -  Reviewed discharge options (IP rehab, SNF, HH therapy, and OP therapy)

## 2019-07-30 NOTE — PLAN OF CARE
Problem: Occupational Therapy Goal  Goal: Occupational Therapy Goal  Goals to be met by: 8/9/19    Patient will increase functional independence with ADLs by performing:    UE Dressing with Contact Guard Assistance.  LE Dressing with Contact Guard Assistance an AD as needed.  Grooming while standing at sink with Contact Guard Assistance.  Toileting from toilet with Stand-by assistance for hygiene and clothing management.   Stand pivot transfers with Stand-by Assistance to reach stable surface.  Toilet transfer to toilet with Stand-by Assistance.       Outcome: Ongoing (interventions implemented as appropriate)  Con't POC.    MELANY Aviles

## 2019-07-30 NOTE — PT/OT/SLP PROGRESS
Occupational Therapy   Treatment    Name: Kayy Espinal  MRN: [id number]  Admitting Diagnosis:  Encephalopathy, metabolic       Recommendations:     Discharge Recommendations: rehabilitation facility  Discharge Equipment Recommendations:  commode, wheelchair, manual, hospital bed(TBD: Daughter is requesting wheelchair and hospital bed)  Barriers to discharge:       Assessment:     Kayy Espinal is a 63 y.o. female with a medical diagnosis of Encephalopathy, metabolic.  She presents with fair tolerance of session this date. Was able to walk to bathroom with Min A and slight posterior lean but for returning walk required Mod A 2/2 hard posterior lean, walking with narrow NICK and general uncoordinated gait. Rehab still appropriate as pt lives alone. Performance deficits affecting function are weakness, impaired self care skills, impaired balance, decreased upper extremity function, impaired functional mobilty, impaired endurance, impaired coordination, decreased safety awareness, gait instability, impaired cognition, decreased lower extremity function, impaired fine motor.     Rehab Prognosis:  Good; patient would benefit from acute skilled OT services to address these deficits and reach maximum level of function.       Plan:     Patient to be seen 3x/week to address the above listed problems via self-care/home management, therapeutic activities, therapeutic exercises, neuromuscular re-education  · Plan of Care Expires:    · Plan of Care Reviewed with: patient    Subjective     Pain/Comfort:  · Pain Rating 1: 0/10    Objective:     Communicated with: rn prior to session.  Patient found supine with peripheral IV upon OT entry to room.    General Precautions: Standard, fall   Orthopedic Precautions:N/A   Braces:       Occupational Performance:     Bed Mobility:    · Patient completed Rolling/Turning to Right with contact guard assistance  · Patient completed Scooting/Bridging with stand by assistance  · Patient  completed Supine to Sit with minimum assistance     Functional Mobility/Transfers:  · Patient completed Sit <> Stand Transfer with minimum assistance  with  hand-held assist   · Patient completed Bed <> Chair Transfer using Step Transfer technique with minimum assistance with hand-held assist  · Patient completed Toilet Transfer Step Transfer technique with minimum assistance with  hand-held assist (poor safety demonstrated as pt didn't back up to toilet and reach back for controlled sit).  · Functional Mobility: HHA/Min - Mod due to posterior lean worsening during walk.     Activities of Daily Living:  · Upper Body Dressing: minimum assistance     [facility] 6 Click ADL: 17    Treatment & Education:  Pt educated on proper sit<>stand techniques (reaching back, proper positioning prior to sitting).  Performed RUE PROM / LUE AROM.  Discussed OT POC.  Educated on calling for assistance and not standing w/o help from staff.    Patient left up in chair with all lines intact and call button in reachEducation:      GOALS:   Multidisciplinary Problems     Occupational Therapy Goals        Problem: Occupational Therapy Goal    Goal Priority Disciplines Outcome Interventions   Occupational Therapy Goal     OT, PT/OT Ongoing (interventions implemented as appropriate)    Description:  Goals to be met by: 8/9/19    Patient will increase functional independence with ADLs by performing:    UE Dressing with Contact Guard Assistance.  LE Dressing with Contact Guard Assistance an AD as needed.  Grooming while standing at sink with Contact Guard Assistance.  Toileting from toilet with Stand-by assistance for hygiene and clothing management.   Stand pivot transfers with Stand-by Assistance to reach stable surface.  Toilet transfer to toilet with Stand-by Assistance.                        Time Tracking:     OT Date of Treatment: 07/30/19  OT Start Time: 0922  OT Stop Time: 0946  OT Total Time (min): 24 min    Billable Minutes:Therapeutic  Activity 14  Therapeutic Exercise 10    MELANY Aviles  7/30/2019

## 2019-07-30 NOTE — ASSESSMENT & PLAN NOTE
Noted on CT at [facility] ED-transferred to [facility] for neurosurgery eval. Has history of CVA with residual right-sided weakness.     -NS consulted  -MRI brain 7/29 with possible mets, low concern for repeat CVA, likely progression of cecal mass into brain  -Planning for colonoscopy 7/31 with GI for biopsy and cytology of mass, will need close oncology follow-up as outpatient

## 2019-07-31 ENCOUNTER — ANESTHESIA (OUTPATIENT)
Dept: ENDOSCOPY | Facility: HOSPITAL | Age: 64
DRG: 064 | End: 2019-07-31
Payer: MEDICARE

## 2019-07-31 PROBLEM — Z51.5 PALLIATIVE CARE ENCOUNTER: Status: ACTIVE | Noted: 2019-07-31

## 2019-07-31 LAB
ALBUMIN SERPL BCP-MCNC: 1.9 G/DL (ref 3.5–5.2)
ALP SERPL-CCNC: 134 U/L (ref 55–135)
ALT SERPL W/O P-5'-P-CCNC: 6 U/L (ref 10–44)
ANION GAP SERPL CALC-SCNC: 10 MMOL/L (ref 8–16)
AST SERPL-CCNC: 39 U/L (ref 10–40)
BASOPHILS # BLD AUTO: 0.04 K/UL (ref 0–0.2)
BASOPHILS NFR BLD: 0.3 % (ref 0–1.9)
BILIRUB SERPL-MCNC: 0.8 MG/DL (ref 0.1–1)
BUN SERPL-MCNC: 11 MG/DL (ref 8–23)
CALCIUM SERPL-MCNC: 8.6 MG/DL (ref 8.7–10.5)
CHLORIDE SERPL-SCNC: 95 MMOL/L (ref 95–110)
CO2 SERPL-SCNC: 29 MMOL/L (ref 23–29)
CREAT SERPL-MCNC: 0.8 MG/DL (ref 0.5–1.4)
DIFFERENTIAL METHOD: ABNORMAL
EOSINOPHIL # BLD AUTO: 0 K/UL (ref 0–0.5)
EOSINOPHIL NFR BLD: 0.2 % (ref 0–8)
ERYTHROCYTE [DISTWIDTH] IN BLOOD BY AUTOMATED COUNT: 25.4 % (ref 11.5–14.5)
EST. GFR  (AFRICAN AMERICAN): >60 ML/MIN/1.73 M^2
EST. GFR  (NON AFRICAN AMERICAN): >60 ML/MIN/1.73 M^2
GLUCOSE SERPL-MCNC: 114 MG/DL (ref 70–110)
HCT VFR BLD AUTO: 30.2 % (ref 37–48.5)
HGB BLD-MCNC: 8.7 G/DL (ref 12–16)
IMM GRANULOCYTES # BLD AUTO: 0.1 K/UL (ref 0–0.04)
IMM GRANULOCYTES NFR BLD AUTO: 0.8 % (ref 0–0.5)
LYMPHOCYTES # BLD AUTO: 1.3 K/UL (ref 1–4.8)
LYMPHOCYTES NFR BLD: 10.4 % (ref 18–48)
MAGNESIUM SERPL-MCNC: 1.5 MG/DL (ref 1.6–2.6)
MCH RBC QN AUTO: 21.9 PG (ref 27–31)
MCHC RBC AUTO-ENTMCNC: 28.8 G/DL (ref 32–36)
MCV RBC AUTO: 76 FL (ref 82–98)
MONOCYTES # BLD AUTO: 1.4 K/UL (ref 0.3–1)
MONOCYTES NFR BLD: 11.7 % (ref 4–15)
NEUTROPHILS # BLD AUTO: 9.3 K/UL (ref 1.8–7.7)
NEUTROPHILS NFR BLD: 76.6 % (ref 38–73)
NRBC BLD-RTO: 0 /100 WBC
PHOSPHATE SERPL-MCNC: 4 MG/DL (ref 2.7–4.5)
PLATELET # BLD AUTO: 478 K/UL (ref 150–350)
PMV BLD AUTO: 10.3 FL (ref 9.2–12.9)
POCT GLUCOSE: 95 MG/DL (ref 70–110)
POTASSIUM SERPL-SCNC: 3.7 MMOL/L (ref 3.5–5.1)
PROT SERPL-MCNC: 6.3 G/DL (ref 6–8.4)
RBC # BLD AUTO: 3.97 M/UL (ref 4–5.4)
SODIUM SERPL-SCNC: 134 MMOL/L (ref 136–145)
WBC # BLD AUTO: 12.09 K/UL (ref 3.9–12.7)

## 2019-07-31 PROCEDURE — 25000003 PHARM REV CODE 250: Performed by: STUDENT IN AN ORGANIZED HEALTH CARE EDUCATION/TRAINING PROGRAM

## 2019-07-31 PROCEDURE — 92523 SPEECH SOUND LANG COMPREHEN: CPT

## 2019-07-31 PROCEDURE — 99233 PR SUBSEQUENT HOSPITAL CARE,LEVL III: ICD-10-PCS | Mod: ,,, | Performed by: HOSPITALIST

## 2019-07-31 PROCEDURE — 80053 COMPREHEN METABOLIC PANEL: CPT

## 2019-07-31 PROCEDURE — 63600175 PHARM REV CODE 636 W HCPCS: Performed by: STUDENT IN AN ORGANIZED HEALTH CARE EDUCATION/TRAINING PROGRAM

## 2019-07-31 PROCEDURE — 99232 PR SUBSEQUENT HOSPITAL CARE,LEVL II: ICD-10-PCS | Mod: ,,, | Performed by: NURSE PRACTITIONER

## 2019-07-31 PROCEDURE — 99223 1ST HOSP IP/OBS HIGH 75: CPT | Mod: GC,,, | Performed by: EMERGENCY MEDICINE

## 2019-07-31 PROCEDURE — 99223 PR INITIAL HOSPITAL CARE,LEVL III: ICD-10-PCS | Mod: GC,,, | Performed by: EMERGENCY MEDICINE

## 2019-07-31 PROCEDURE — 36415 COLL VENOUS BLD VENIPUNCTURE: CPT

## 2019-07-31 PROCEDURE — 99233 SBSQ HOSP IP/OBS HIGH 50: CPT | Mod: ,,, | Performed by: HOSPITALIST

## 2019-07-31 PROCEDURE — 99232 SBSQ HOSP IP/OBS MODERATE 35: CPT | Mod: ,,, | Performed by: NURSE PRACTITIONER

## 2019-07-31 PROCEDURE — 84100 ASSAY OF PHOSPHORUS: CPT

## 2019-07-31 PROCEDURE — 83735 ASSAY OF MAGNESIUM: CPT

## 2019-07-31 PROCEDURE — 85025 COMPLETE CBC W/AUTO DIFF WBC: CPT

## 2019-07-31 PROCEDURE — 20600001 HC STEP DOWN PRIVATE ROOM

## 2019-07-31 RX ORDER — AMLODIPINE BESYLATE 10 MG/1
10 TABLET ORAL DAILY
Qty: 30 TABLET | Refills: 11 | Status: CANCELLED | OUTPATIENT
Start: 2019-07-31 | End: 2020-07-30

## 2019-07-31 RX ORDER — LOSARTAN POTASSIUM 50 MG/1
50 TABLET ORAL DAILY
Qty: 90 TABLET | Refills: 0 | Status: SHIPPED | OUTPATIENT
Start: 2019-07-31 | End: 2019-08-02

## 2019-07-31 RX ORDER — AMLODIPINE BESYLATE 10 MG/1
10 TABLET ORAL DAILY
Qty: 30 TABLET | Refills: 11 | Status: SHIPPED | OUTPATIENT
Start: 2019-07-31 | End: 2019-08-02

## 2019-07-31 RX ORDER — METOCLOPRAMIDE HYDROCHLORIDE 5 MG/ML
10 INJECTION INTRAMUSCULAR; INTRAVENOUS EVERY 6 HOURS
Status: ACTIVE | OUTPATIENT
Start: 2019-07-31 | End: 2019-07-31

## 2019-07-31 RX ORDER — POLYETHYLENE GLYCOL 3350, SODIUM SULFATE ANHYDROUS, SODIUM BICARBONATE, SODIUM CHLORIDE, POTASSIUM CHLORIDE 236; 22.74; 6.74; 5.86; 2.97 G/4L; G/4L; G/4L; G/4L; G/4L
4000 POWDER, FOR SOLUTION ORAL ONCE
Status: COMPLETED | OUTPATIENT
Start: 2019-07-31 | End: 2019-07-31

## 2019-07-31 RX ORDER — LANOLIN ALCOHOL/MO/W.PET/CERES
400 CREAM (GRAM) TOPICAL ONCE
Status: COMPLETED | OUTPATIENT
Start: 2019-07-31 | End: 2019-07-31

## 2019-07-31 RX ORDER — ACETAMINOPHEN 325 MG/1
650 TABLET ORAL EVERY 8 HOURS PRN
Status: DISCONTINUED | OUTPATIENT
Start: 2019-07-31 | End: 2019-08-02 | Stop reason: HOSPADM

## 2019-07-31 RX ADMIN — ACETAMINOPHEN 650 MG: 325 TABLET ORAL at 12:07

## 2019-07-31 RX ADMIN — PANTOPRAZOLE SODIUM 40 MG: 40 TABLET, DELAYED RELEASE ORAL at 09:07

## 2019-07-31 RX ADMIN — AMLODIPINE BESYLATE 10 MG: 10 TABLET ORAL at 09:07

## 2019-07-31 RX ADMIN — ATORVASTATIN CALCIUM 40 MG: 20 TABLET, FILM COATED ORAL at 09:07

## 2019-07-31 RX ADMIN — MAGNESIUM OXIDE TAB 400 MG (241.3 MG ELEMENTAL MG) 400 MG: 400 (241.3 MG) TAB at 09:07

## 2019-07-31 RX ADMIN — LOSARTAN POTASSIUM 50 MG: 25 TABLET, FILM COATED ORAL at 09:07

## 2019-07-31 RX ADMIN — POLYETHYLENE GLYCOL 3350, SODIUM SULFATE ANHYDROUS, SODIUM BICARBONATE, SODIUM CHLORIDE, POTASSIUM CHLORIDE 4000 ML: 236; 22.74; 6.74; 5.86; 2.97 POWDER, FOR SOLUTION ORAL at 06:07

## 2019-07-31 RX ADMIN — ONDANSETRON 4 MG: 2 INJECTION INTRAMUSCULAR; INTRAVENOUS at 12:07

## 2019-07-31 NOTE — SUBJECTIVE & OBJECTIVE
Interval History 7/31/2019:  Patient is seen for follow-up rehab evaluation and recommendations: Participating with therapy.    HPI, Past Medical, Family, and Social History remains the same as documented in the initial encounter.    Scheduled Medications:    amLODIPine  10 mg Oral Daily    atorvastatin  40 mg Oral Daily    losartan  50 mg Oral Daily    pantoprazole  40 mg Oral Daily       Diagnostic Results: Labs: Reviewed    PRN Medications: acetaminophen, Dextrose 10% Bolus, Dextrose 10% Bolus, glucagon (human recombinant), glucose, glucose, insulin aspart U-100, pneumoc 13-raymond conj-dip cr(PF), sodium chloride 0.9%    Review of Systems   Reason unable to perform ROS: limited 2/2 cognition.   Constitutional: Positive for activity change.   Respiratory: Negative for cough and shortness of breath.    Cardiovascular: Negative for chest pain and palpitations.   Musculoskeletal: Positive for gait problem.   Neurological: Positive for facial asymmetry and weakness.   Psychiatric/Behavioral: Positive for confusion.     Objective:     Vital Signs (Most Recent):  Temp: 97.3 °F (36.3 °C) (07/31/19 0715)  Pulse: 78 (07/31/19 0715)  Resp: 18 (07/31/19 0715)  BP: 135/72 (07/31/19 0715)  SpO2: 99 % (07/31/19 0715)    Vital Signs (24h Range):  Temp:  [97.3 °F (36.3 °C)-98.6 °F (37 °C)] 97.3 °F (36.3 °C)  Pulse:  [] 78  Resp:  [18-20] 18  SpO2:  [95 %-99 %] 99 %  BP: (109-139)/(60-73) 135/72     Physical Exam   Constitutional: She appears well-developed and well-nourished.   HENT:   Head: Normocephalic and atraumatic.   Eyes: Right eye exhibits no discharge. Left eye exhibits no discharge.   Neck: Neck supple.   Cardiovascular: Normal rate and intact distal pulses.   Pulmonary/Chest: Effort normal. No respiratory distress.   Abdominal: Soft. There is no tenderness.   Musculoskeletal: She exhibits no edema or deformity.   R sided weakness    Neurological: She is alert. She is disoriented. She exhibits abnormal muscle  tone.   Follows commands   Facial asymmetry    Skin: Skin is warm and dry.   Psychiatric: She has a normal mood and affect. Her behavior is normal. Cognition and memory are impaired.   Vitals reviewed.

## 2019-07-31 NOTE — PLAN OF CARE
Problem: Adult Inpatient Plan of Care  Goal: Plan of Care Review  Outcome: Ongoing (interventions implemented as appropriate)  POC reviewed with patient; understanding verbalized. Pt. with nonskid footwear on, bed in lowest position, and locked with bed rails up x2.  Pt. instructed to call prior to getting OOB.  Pt. has call light and personal items within reach. VSS and afebrile this shift. All questions and concerns addressed at this time. Will continue to monitor. Palliative care consulted and has seen patient. Patient will be NPO tonight, golytely attempted again for scope on tomorrow. No complaints of pain, up with assistance.

## 2019-07-31 NOTE — PHARMACY MED REC
"Admission Medication Reconciliation - Pharmacy Consult Note    The home medication history was taken by Bertha Beatty Pharmacy Tech.     Based on information gathered and subsequent review by the clinical pharmacist, the items below may need attention.     You may go to "Admission" then "Reconcile Home Medications" tabs to review and/or act upon these items.     Potentially problematic discrepancies with current MAR  o Patient IS NOT taking the following which was ordered upon admit  o Amlodipine (last filled 11/2018)  o Losartan (last filled 11/2018)    Potential issues to be addressed PRIOR TO DISCHARGE  o Patient previously on metformin/insulin; A1c this admission = 4.9%.  Currently off meds  o Agree with atorvastatin 40 mg for h/o CVA; last filled 11/2018.  May need refills.    Please address this information as you see fit.  Feel free to contact us if you have any questions or require assistance.    Abiel Giron, Pharm.D., BCPS  [id number]                    .    .            "

## 2019-07-31 NOTE — HPI
Kayy Espinal is a 63 y.o. female with a past medical history significant for CVA with residual R sided weakness and admission 6/10/19-6/13/19 for anemia work up which found a mass highly suspicious for cecal carcinoma along with enlarged liver containing numerous diffuse complex hypodensities highly suspicious for liver metastasis. She presented to outside ED 2019/7/28 with family reporting progressive confusion and weight loss for the preceeding 2 weeks, was found to have acute petechial hemorrhage of the right inferomedial cerebellum and was transferred to [facility] for neurosurgery evaluation, concerning for metastases. As cognitive functioning improved the patient voiced that she did not wish to continue work up, primary team noted that she declined colonoscopy for biopsy, understands risks of not undergoing biopsy and getting treatment, and was interested in talking to palliative care about comfort measures at home, so Palliative care team was consulted to discuss Goals of Care.

## 2019-07-31 NOTE — SUBJECTIVE & OBJECTIVE
Interval History:     Past Medical History:   Diagnosis Date    Asthma     Chronic anemia     Diabetes mellitus     Hypertension     Stroke        Past Surgical History:   Procedure Laterality Date    ESOPHAGOGASTRODUODENOSCOPY (EGD) N/A 6/12/2019    Performed by Wilber Duarte MD at [facility] ENDO       Review of patient's allergies indicates:  No Known Allergies    Medications:  Continuous Infusions:  Scheduled Meds:   amLODIPine  10 mg Oral Daily    atorvastatin  40 mg Oral Daily    losartan  50 mg Oral Daily    pantoprazole  40 mg Oral Daily    polyethylene glycol  4,000 mL Oral Once     PRN Meds:acetaminophen, Dextrose 10% Bolus, Dextrose 10% Bolus, glucagon (human recombinant), glucose, glucose, insulin aspart U-100, pneumoc 13-raymond conj-dip cr(PF), sodium chloride 0.9%    Family History     Problem Relation (Age of Onset)    Diabetes Mother    Hypertension Mother        Tobacco Use    Smoking status: Never Smoker    Smokeless tobacco: Never Used   Substance and Sexual Activity    Alcohol use: Not Currently    Drug use: Not Currently    Sexual activity: Not Currently       Review of Systems   Gastrointestinal: Negative for abdominal distention, abdominal pain, constipation, diarrhea and nausea.     Objective:     Vital Signs (Most Recent):  Temp: 97.9 °F (36.6 °C) (07/31/19 1619)  Pulse: 108 (07/31/19 1619)  Resp: 18 (07/31/19 1619)  BP: 112/60 (07/31/19 1619)  SpO2: 98 % (07/31/19 1619) Vital Signs (24h Range):  Temp:  [97.3 °F (36.3 °C)-98.5 °F (36.9 °C)] 97.9 °F (36.6 °C)  Pulse:  [] 108  Resp:  [18-20] 18  SpO2:  [97 %-99 %] 98 %  BP: (109-139)/(60-73) 112/60     Weight: 61.8 kg (136 lb 3.9 oz)  Body mass index is 22.67 kg/m².    Review of Symptoms  Symptom Assessment (ESAS 0-10 scale)   ESAS 0 1 2 3 4 5 6 7 8 9 10   Pain              Dyspnea              Anxiety              Nausea              Depression               Anorexia              Fatigue              Insomnia               Restlessness               Agitation              CAM / Delirium __ --  ___+   Constipation     __ --  ___+   Diarrhea           __ --  ___+  Bowel Management Plan (BMP): Yes    Comments:     Pain Assessment: denied    OME in 24 hours: 0    Performance Status: 90    ECOG Performance Status Grade: 1 - Ambulates, capable of light work    Physical Exam   Constitutional: No distress.   Temporal wasting   HENT:   Head: Normocephalic and atraumatic.   Eyes: EOM are normal.   Neck: No JVD present.   Cardiovascular: Normal rate, regular rhythm and normal heart sounds.   Pulmonary/Chest: Effort normal and breath sounds normal. No respiratory distress. She has no wheezes. She has no rales.   Abdominal: Soft. She exhibits no distension. There is no tenderness. There is no guarding.   Hypoactive bowel sounds   Neurological: She is alert.   R hemiplegia   Skin: Skin is warm and dry.   Psychiatric: Judgment normal.   Nursing note and vitals reviewed.      Significant Labs: All pertinent labs within the past 24 hours have been reviewed.  CBC:   Recent Labs   Lab 07/31/19  0356   WBC 12.09   HGB 8.7*   HCT 30.2*   MCV 76*   *     BMP:  Recent Labs   Lab 07/31/19  0356   *   *   K 3.7   CL 95   CO2 29   BUN 11   CREATININE 0.8   CALCIUM 8.6*   MG 1.5*     LFT:  Lab Results   Component Value Date    AST 39 07/31/2019    ALKPHOS 134 07/31/2019    BILITOT 0.8 07/31/2019     Albumin:   Albumin   Date Value Ref Range Status   07/31/2019 1.9 (L) 3.5 - 5.2 g/dL Final     Protein:   Total Protein   Date Value Ref Range Status   07/31/2019 6.3 6.0 - 8.4 g/dL Final     Lactic acid:   Lab Results   Component Value Date    LACTATE 2.4 (H) 07/29/2019    LACTATE 1.9 07/28/2019       Significant Imaging: I have reviewed all pertinent imaging results/findings within the past 24 hours.    Advance Care Planning   Advanced Directives::  Living Will: No  LaPOST: No  Do Not Resuscitate Status: No  Medical Power of : No, but  stated that she would like her daughters to make decisions for her if she cannot, and is not     Decision-Making Capacity: Patient answered questions, Family answered questions       Living Arrangements: Lives alone, daughter lives next door helps with IADLs eg medication management, bill paying    Psychosocial/Cultural:  Patient's most important priorities:  She is an independent person who likes her own space, also values the support of her children    Patient's biggest concerns/fears:  TBD, has noted loss of independence, pain as concerns    Previous death/end of life care history:  Her father's mother had cancer    Patient's goals/hopes:  TBD, involved continued independence    Spiritual:     F- Shanti and Belief: yes    I - Importance: important  .  C - Community: going to [employer] is important    A - Address in Care: TBD

## 2019-07-31 NOTE — ASSESSMENT & PLAN NOTE
63 y.o. female with CVA (with residual R sided weakness), HTN, T2DM, GERD, cecal mass, who presents as transfer from [facility] ED for neurosurgery evaluation of petechial hemorrhage of R cerebellum. Per family has had forgetfullness, dec appetite and weight loss, urinary frequency and diarrhea, now resolved. Oriented only to self / place, disoriented to situation, no new FND (residual R sided weakness from prior CVA).     On admit:  - TSH wnl  - WBC 11.6, Hb 10.2, Plt 506, INR 1.0, Na 136, K 3.1, lactate 1.9  - UDS negative.  - UA with 13 WBCs, nitrite pos; denies symptoms but per family has had urinary freq and AMS  - patient was HDS and afebrile.       - MRI brain 7/29: Small enhancing lesion within the cerebellum on the right corresponding to abnormality on prior CT head, concern for metastatic lesion  -Given history of cecal mass, likely source, GI plans on doing c-scope Wednesday for evaluation   - Neurosurgery consulted  - Was on rocephin 1 g for UTI, discontinued on 7/30  -No further clinical symptoms,  repeat UA with improvement, blood cultures no growth to date  -Pt alert and oriented X3

## 2019-07-31 NOTE — ASSESSMENT & PLAN NOTE
Noted on CT at [facility] ED-transferred to [facility] for neurosurgery eval. Has history of CVA with residual right-sided weakness.     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

## 2019-07-31 NOTE — SUBJECTIVE & OBJECTIVE
Interval History: Pt with nausea/vomitting overnight due to colonoscopy prep, unable to tolerate. Spoke to GI, pt amenable to trying again tonight with Zofran ordered.     Review of Systems   Constitutional: Negative for chills, fatigue and fever.   Respiratory: Negative for shortness of breath and wheezing.    Cardiovascular: Negative for chest pain and leg swelling.   Gastrointestinal: Positive for nausea and vomiting.   Genitourinary: Negative for dysuria.   Skin: Negative for rash.   Neurological: Negative for headaches.     Objective:     Vital Signs (Most Recent):  Temp: 97.9 °F (36.6 °C) (07/31/19 1619)  Pulse: 108 (07/31/19 1619)  Resp: 18 (07/31/19 1619)  BP: 112/60 (07/31/19 1619)  SpO2: 98 % (07/31/19 1619) Vital Signs (24h Range):  Temp:  [97.3 °F (36.3 °C)-98.5 °F (36.9 °C)] 97.9 °F (36.6 °C)  Pulse:  [] 108  Resp:  [18-20] 18  SpO2:  [97 %-99 %] 98 %  BP: (109-139)/(60-73) 112/60     Weight: 61.8 kg (136 lb 3.9 oz)  Body mass index is 22.67 kg/m².  No intake or output data in the 24 hours ending 07/31/19 1633   Physical Exam   Constitutional: She is oriented to person, place, and time. She appears well-developed and well-nourished. No distress.   HENT:   Head: Normocephalic and atraumatic.   Mouth/Throat: No oropharyngeal exudate.   Eyes: Conjunctivae and EOM are normal.   Neck: Normal range of motion. Neck supple.   Cardiovascular: Normal rate and regular rhythm.   No murmur heard.  Pulmonary/Chest: Effort normal and breath sounds normal. She has no wheezes.   Abdominal: Soft. She exhibits no distension. There is no tenderness. There is no guarding.   Musculoskeletal: Normal range of motion. She exhibits no edema.   Neurological: She is alert and oriented to person, place, and time.   Skin: Skin is warm and dry. She is not diaphoretic.   Psychiatric: She has a normal mood and affect.   Nursing note and vitals reviewed.      Significant Labs:   BMP:   Recent Labs   Lab 07/31/19  0356   *    *   K 3.7   CL 95   CO2 29   BUN 11   CREATININE 0.8   CALCIUM 8.6*   MG 1.5*     CBC:   Recent Labs   Lab 07/30/19  0510 07/31/19  0356   WBC 11.07 12.09   HGB 8.7* 8.7*   HCT 30.2* 30.2*   * 478*       Significant Imaging: I have reviewed all pertinent imaging results/findings within the past 24 hours.

## 2019-07-31 NOTE — NURSING
Paged IM2 for notification with family had arrived. Returned page and will notify family that team will round back in about 2 hours.

## 2019-07-31 NOTE — PLAN OF CARE
Problem: SLP Goal  Goal: SLP Goal  Speech-Language Pathology Goals  Goals to be met by 8/7/19  1. Patient will independently orient x4.   2. Patient will recall 3/4 unrelated objects with a 5-minute delay and max assist.   3. Patient will follow 2-step commands with 80% accuracy and min assist.   4. Patient will answer simple problem solving/safety awareness questions with 75% accuracy and mod assist.   5. Patient will answer immediate recall questions with 75% accuracy and mod assist.  6. Patient will participate in ongoing assessment of cognitive-linguistic skills.   7. Patient will participate in ongoing assessment of reading/writing/visuospatial skills.  8. Educate patient and family regarding ST in patient's POC.  Patient seen for a speech/language/cognitive eval.     Anthony Machado CCC-SLP  Speech-Language Pathology  Pager: [phone]

## 2019-07-31 NOTE — TREATMENT PLAN
GI Treatment Plan  07/31/2019  6:23 PM    Patient scheduled for colonoscopy tomorrow.  She needs to be NPO after MN.  She also needs to complete the entire GL prep.    Gagandeep Scott M.D.  Gastroenterology Fellow, PGY-VI  Pager: [phone]  [facility]

## 2019-07-31 NOTE — CONSULTS
[facility]  Palliative Medicine  Consult Note    Patient Name: Kayy Espinal  MRN: [id number]  Admission Date: 7/28/2019  Hospital Length of Stay: 2 days  Code Status: Full Code   Attending Provider: Holly Montiel MD  Consulting Provider: Merline Loyd MD  Primary Care Physician: Enrique Dos Santos MD  Principal Problem:Encephalopathy, metabolic    Patient information was obtained from patient, relative(s), past medical records and ER records.      Inpatient consult to Palliative Care  Consult performed by: Merline Loyd MD  Consult ordered by: Genevieve Ptael MD        Assessment/Plan:     Palliative care encounter  Kayy Espinal is a 63 y.o. female with a past medical history significant for CVA with residual R sided weakness and admission 6/10/19-6/13/19 for anemia work up which found a mass highly suspicious for cecal carcinoma along with enlarged liver containing numerous diffuse complex hypodensities highly suspicious for liver metastasis. She presented to outside ED 2019/7/28 with family reporting progressive confusion and weight loss for the preceeding 2 weeks, was found to have acute petechial hemorrhage of the right inferomedial cerebellum and was transferred to [facility] for neurosurgery evaluation, concerning for metastases. As cognitive functioning improved the patient voiced that she did not wish to continue work up, primary team noted that she declined colonoscopy for biopsy, understands risks of not undergoing biopsy and getting treatment, and was interested in talking to palliative care about comfort measures at home, so Palliative care team was consulted to discuss Goals of Care.    7/31: On approach patient was in room with Crissy ([id number]) and Tammy ([phone]), two of her three daughters (third daughter is [facility] [phone], two sons Jose and Shamar are not local). Patient noted that primary team had told her that they were worried that she had cancer and that  palliative care would come by to see her. We discussed the scope of palliative care as a service that can offer additional support and can get to know her to help ensure that the interventions that she is offered are in line with her goals and values. We discussed her understanding of the concern for her cancer and the potential benefits and risks of the work-up stage at hand (c-scope). She noted that she had been afraid of the c-scope because she thought it was surgery which scared her. Her daughters noted that she has difficulty understanding nuanced subjects like this and would like to ensure that at least one daughter is present for any future significant discussions and whenever any significant information is imparted. The patient noted that she would like her daughters to be decision makers in cases in which she cannot make her own decisions. The daughters reported that they accepted this noting that while they had different views (one favors aggressive curative measures noting that she has a lot of geovanna in ability to heal, while another noted that she had seen a loved one go through metastatic cancer management and felt that depending on the situation a non-curative approach could provide value), but felt that they would be able to form a consensus and were willing to guide their mother's care. We discussed that the c-scope is not surgery, and started to delve into what ways the results of the scope may or may not . The patient and her daughters opted to try the c-scope to get more information at the outpatient follow up. They were interested in continued palliative involvement including potentially in that outpatient setting.        Thank you for your consult. I will follow-up with patient. Please contact us if you have any additional questions.    Subjective:     HPI:   Kayy Espinal is a 63 y.o. female with a past medical history significant for CVA with residual R sided weakness and  admission 6/10/19-6/13/19 for anemia work up which found a mass highly suspicious for cecal carcinoma along with enlarged liver containing numerous diffuse complex hypodensities highly suspicious for liver metastasis. She presented to outside ED 2019/7/28 with family reporting progressive confusion and weight loss for the preceeding 2 weeks, was found to have acute petechial hemorrhage of the right inferomedial cerebellum and was transferred to [facility] for neurosurgery evaluation, concerning for metastases. As cognitive functioning improved the patient voiced that she did not wish to continue work up, primary team noted that she declined colonoscopy for biopsy, understands risks of not undergoing biopsy and getting treatment, and was interested in talking to palliative care about comfort measures at home, so Palliative care team was consulted to discuss Goals of Care.        Hospital Course:  No notes on file    Interval History:     Past Medical History:   Diagnosis Date    Asthma     Chronic anemia     Diabetes mellitus     Hypertension     Stroke        Past Surgical History:   Procedure Laterality Date    ESOPHAGOGASTRODUODENOSCOPY (EGD) N/A 6/12/2019    Performed by Wilber Duarte MD at [facility] ENDO       Review of patient's allergies indicates:  No Known Allergies    Medications:  Continuous Infusions:  Scheduled Meds:   amLODIPine  10 mg Oral Daily    atorvastatin  40 mg Oral Daily    losartan  50 mg Oral Daily    pantoprazole  40 mg Oral Daily    polyethylene glycol  4,000 mL Oral Once     PRN Meds:acetaminophen, Dextrose 10% Bolus, Dextrose 10% Bolus, glucagon (human recombinant), glucose, glucose, insulin aspart U-100, pneumoc 13-raymond conj-dip cr(PF), sodium chloride 0.9%    Family History     Problem Relation (Age of Onset)    Diabetes Mother    Hypertension Mother        Tobacco Use    Smoking status: Never Smoker    Smokeless tobacco: Never Used   Substance and Sexual Activity    Alcohol use:  Not Currently    Drug use: Not Currently    Sexual activity: Not Currently       Review of Systems   Gastrointestinal: Negative for abdominal distention, abdominal pain, constipation, diarrhea and nausea.     Objective:     Vital Signs (Most Recent):  Temp: 97.9 °F (36.6 °C) (07/31/19 1619)  Pulse: 108 (07/31/19 1619)  Resp: 18 (07/31/19 1619)  BP: 112/60 (07/31/19 1619)  SpO2: 98 % (07/31/19 1619) Vital Signs (24h Range):  Temp:  [97.3 °F (36.3 °C)-98.5 °F (36.9 °C)] 97.9 °F (36.6 °C)  Pulse:  [] 108  Resp:  [18-20] 18  SpO2:  [97 %-99 %] 98 %  BP: (109-139)/(60-73) 112/60     Weight: 61.8 kg (136 lb 3.9 oz)  Body mass index is 22.67 kg/m².    Review of Symptoms  Symptom Assessment (ESAS 0-10 scale)   ESAS 0 1 2 3 4 5 6 7 8 9 10   Pain              Dyspnea              Anxiety              Nausea              Depression               Anorexia              Fatigue              Insomnia              Restlessness               Agitation              CAM / Delirium __ --  ___+   Constipation     __ --  ___+   Diarrhea           __ --  ___+  Bowel Management Plan (BMP): Yes    Comments:     Pain Assessment: denied    OME in 24 hours: 0    Performance Status: 90    ECOG Performance Status Grade: 1 - Ambulates, capable of light work    Physical Exam   Constitutional: No distress.   Temporal wasting   HENT:   Head: Normocephalic and atraumatic.   Eyes: EOM are normal.   Neck: No JVD present.   Cardiovascular: Normal rate, regular rhythm and normal heart sounds.   Pulmonary/Chest: Effort normal and breath sounds normal. No respiratory distress. She has no wheezes. She has no rales.   Abdominal: Soft. She exhibits no distension. There is no tenderness. There is no guarding.   Hypoactive bowel sounds   Neurological: She is alert.   R hemiplegia   Skin: Skin is warm and dry.   Psychiatric: Judgment normal.   Nursing note and vitals reviewed.      Significant Labs: All pertinent labs within the past 24 hours have been  reviewed.  CBC:   Recent Labs   Lab 07/31/19  0356   WBC 12.09   HGB 8.7*   HCT 30.2*   MCV 76*   *     BMP:  Recent Labs   Lab 07/31/19  0356   *   *   K 3.7   CL 95   CO2 29   BUN 11   CREATININE 0.8   CALCIUM 8.6*   MG 1.5*     LFT:  Lab Results   Component Value Date    AST 39 07/31/2019    ALKPHOS 134 07/31/2019    BILITOT 0.8 07/31/2019     Albumin:   Albumin   Date Value Ref Range Status   07/31/2019 1.9 (L) 3.5 - 5.2 g/dL Final     Protein:   Total Protein   Date Value Ref Range Status   07/31/2019 6.3 6.0 - 8.4 g/dL Final     Lactic acid:   Lab Results   Component Value Date    LACTATE 2.4 (H) 07/29/2019    LACTATE 1.9 07/28/2019       Significant Imaging: I have reviewed all pertinent imaging results/findings within the past 24 hours.    Advance Care Planning   Advanced Directives::  Living Will: No  LaPOST: No  Do Not Resuscitate Status: No  Medical Power of : No, but stated that she would like her daughters to make decisions for her if she cannot, and is not     Decision-Making Capacity: Patient answered questions, Family answered questions       Living Arrangements: Lives alone, daughter lives next door helps with IADLs eg medication management, bill paying    Psychosocial/Cultural:  Patient's most important priorities:  She is an independent person who likes her own space, also values the support of her children    Patient's biggest concerns/fears:  TBD, has noted loss of independence, pain as concerns    Previous death/end of life care history:  Her father's mother had cancer    Patient's goals/hopes:  TBD, involved continued independence    Spiritual:     F- Shanti and Belief: yes    I - Importance: important  .  C - Community: going to [employer] is important    A - Address in Care: TBD      > 50% of 60 min visit spent in chart review, face to face discussion of goals of care,  symptom assessment, coordination of care and emotional support.    Merline Loyd,  MD  Palliative Medicine  [facility]

## 2019-07-31 NOTE — PROGRESS NOTES
[facility] Hospital Medicine  Progress Note    Patient Name: Kayy Espinal  MRN: [id number]  Patient Class: IP- Inpatient   Admission Date: 7/28/2019  Length of Stay: 2 days  Attending Physician: Holly Montiel MD  Primary Care Provider: Enrique Dos Santos MD    [facility] Medicine Team: [facility] [place] [facility] [place] 2 Genevieve Patel MD    Subjective:     Principal Problem:Encephalopathy, metabolic      HPI:  Kayy Espinal is a 63 y.o. female with CVA (with residual R sided weakness), HTN, T2DM, GERD, cecal mass, who presents as transfer from [facility] ED for neurosurgery evaluation of petechial hemorrhage of R cerebellum. Patient appears confused about why she is in hospital; per chart review, patient presented to [facility] ED with family yesterday evening who stated that over the past 2 weeks patient has been more forgetful, with decreased appetite and significant weight loss, and does not seem to be speaking or verbalizing as she normally does. In addition, they noted frequent urination and diarrhea for 2-3 days, which reportedly resolved yesterday. She notes no further episodes today. Patient lives alone, but daughters (3) help with her care at times as they live in her neighborhood and check on her frequently. They reportedly deny any recent falls. Of note, patient was recently admitted 6/10/19-6/13/19 after she presented to ED requesting a medication refill and was found to be severely anemic (Hb 4), and received 3 u pRBCs. 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 Patient was offered inpatient colonoscopy (due to concern that patient might get lost to follow up) for further evaluation but patient deferred to outpatient. Patient denies abdominal pain. She does not appear  to have followed up.     On presentation to [facility] ED, CTH was obtained d/t concern for metastasis which was negative for mets and acute infarct, but did show small acute petechial hemorrhage of the right inferomedial cerebellum of uncertain etiology, with no associated vasogenic edema. Labs with WBC 11.6, Hb 10.2, Plt 506, INR 1.0, Na 136, K 3.1, lactate 1.9, TSH 2.6, UA with 13 WBCs, nitrite pos; patient was HDS and afebrile. CT a/p was then obtained, which was unchanged from prior. Decision was made to transfer patient to [facility] for neurosurgery evaluation.     Overview/Hospital Course:  Daughters at bedside feel pt's mentation is improved from admit. Started on Rocephin 1g for UTI. Ammonia wnl. Brain MRI with concern for metastasis.   Rosephin discontinued due to improvement on repeat urine culture and no further clinical symtoms. GI was planning on C-scope 7/31 for biopsy but pt not tolerating prep. Will try again tonight and see. Palliative care consult placed for possible comfort measures if pt opts not to receive scope tomorrow. Likely will discharge tomorrow following colonoscopy with close outpatient follow-up with oncology if pt can receive procedure.     Interval History: Pt with nausea/vomitting overnight due to colonoscopy prep, unable to tolerate. Spoke to GI, pt amenable to trying again tonight with Zofran ordered.     Review of Systems   Constitutional: Negative for chills, fatigue and fever.   Respiratory: Negative for shortness of breath and wheezing.    Cardiovascular: Negative for chest pain and leg swelling.   Gastrointestinal: Positive for nausea and vomiting.   Genitourinary: Negative for dysuria.   Skin: Negative for rash.   Neurological: Negative for headaches.     Objective:     Vital Signs (Most Recent):  Temp: 97.9 °F (36.6 °C) (07/31/19 1619)  Pulse: 108 (07/31/19 1619)  Resp: 18 (07/31/19 1619)  BP: 112/60 (07/31/19 1619)  SpO2: 98 % (07/31/19 1619) Vital Signs (24h Range):  Temp:  [97.3 °F  (36.3 °C)-98.5 °F (36.9 °C)] 97.9 °F (36.6 °C)  Pulse:  [] 108  Resp:  [18-20] 18  SpO2:  [97 %-99 %] 98 %  BP: (109-139)/(60-73) 112/60     Weight: 61.8 kg (136 lb 3.9 oz)  Body mass index is 22.67 kg/m².  No intake or output data in the 24 hours ending 07/31/19 1633   Physical Exam   Constitutional: She is oriented to person, place, and time. She appears well-developed and well-nourished. No distress.   HENT:   Head: Normocephalic and atraumatic.   Mouth/Throat: No oropharyngeal exudate.   Eyes: Conjunctivae and EOM are normal.   Neck: Normal range of motion. Neck supple.   Cardiovascular: Normal rate and regular rhythm.   No murmur heard.  Pulmonary/Chest: Effort normal and breath sounds normal. She has no wheezes.   Abdominal: Soft. She exhibits no distension. There is no tenderness. There is no guarding.   Musculoskeletal: Normal range of motion. She exhibits no edema.   Neurological: She is alert and oriented to person, place, and time.   Skin: Skin is warm and dry. She is not diaphoretic.   Psychiatric: She has a normal mood and affect.   Nursing note and vitals reviewed.      Significant Labs:   BMP:   Recent Labs   Lab 07/31/19  0356   *   *   K 3.7   CL 95   CO2 29   BUN 11   CREATININE 0.8   CALCIUM 8.6*   MG 1.5*     CBC:   Recent Labs   Lab 07/30/19  0510 07/31/19  0356   WBC 11.07 12.09   HGB 8.7* 8.7*   HCT 30.2* 30.2*   * 478*       Significant Imaging: I have reviewed all pertinent imaging results/findings within the past 24 hours.      Assessment/Plan:      * Encephalopathy, metabolic    63 y.o. female with CVA (with residual R sided weakness), HTN, T2DM, GERD, cecal mass, who presents as transfer from [facility] ED for neurosurgery evaluation of petechial hemorrhage of R cerebellum. Per family has had forgetfullness, dec appetite and weight loss, urinary frequency and diarrhea, now resolved. Oriented only to self / place, disoriented to situation, no new FND (residual R  sided weakness from prior CVA).     On admit:  - TSH wnl  - WBC 11.6, Hb 10.2, Plt 506, INR 1.0, Na 136, K 3.1, lactate 1.9  - UDS negative.  - UA with 13 WBCs, nitrite pos; denies symptoms but per family has had urinary freq and AMS  - patient was HDS and afebrile.       - MRI brain 7/29: Small enhancing lesion within the cerebellum on the right corresponding to abnormality on prior CT head, concern for metastatic lesion  -Given history of cecal mass, likely source, GI plans on doing c-scope Wednesday for evaluation   - Neurosurgery consulted  - Was on rocephin 1 g for UTI, discontinued on 7/30  -No further clinical symptoms,  repeat UA with improvement, blood cultures no growth to date  -Pt alert and oriented X3    Impaired functional mobility and endurance  PT/OT evaluating       Cecal neoplasm  Recent diagnosis  (admitted 6/10/19-6/13/19) with anemia (Hb 4); EGD was unremarkable; CT a/p done which showed a bulky soft tissue mass seen in the cecum with irregular borders highly suspicious for cecal carcinoma along with enlarged liver containing numerous diffuse complex hypodensities highly suspicious for liver metastasis.     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 Has history of CVA with residual right-sided weakness.     -NS consulted  -MRI brain 7/29 with possible mets, low concern for repeat CVA, likely progression of cecal mass into brain  -Did not tolerate initial bowel prep, will try again with scheduled anti-emetics  -Planning for colonoscopy 8/1 with GI for biopsy and cytology of mass, will need close oncology follow-up as outpatient       Essential hypertension  Resume home meds of amlodipine and losartan        VTE Risk Mitigation (From admission, onward)         Ordered     Place sequential compression device  Until discontinued      07/29/19 0217     IP VTE HIGH RISK PATIENT  Once      07/29/19 0217                Genevieve Patel MD  Department of Hospital Medicine   [facility]

## 2019-07-31 NOTE — ASSESSMENT & PLAN NOTE
Kayy Espinal is a 63 y.o. female with a past medical history significant for CVA with residual R sided weakness and admission 6/10/19-6/13/19 for anemia work up which found a mass highly suspicious for cecal carcinoma along with enlarged liver containing numerous diffuse complex hypodensities highly suspicious for liver metastasis. She presented to outside ED 2019/7/28 with family reporting progressive confusion and weight loss for the preceeding 2 weeks, was found to have acute petechial hemorrhage of the right inferomedial cerebellum and was transferred to [facility] for neurosurgery evaluation, concerning for metastases. As cognitive functioning improved the patient voiced that she did not wish to continue work up, primary team noted that she declined colonoscopy for biopsy, understands risks of not undergoing biopsy and getting treatment, and was interested in talking to palliative care about comfort measures at home, so Palliative care team was consulted to discuss Goals of Care.    7/31: On approach patient was in room with Crissy ([phone]) and Tammy ([phone]), two of her three daughters (third daughter is [facility] [phone], two sons Jose and Shamar are not local). Patient noted that primary team had told her that they were worried that she had cancer and that palliative care would come by to see her. We discussed the scope of palliative care as a service that can offer additional support and can get to know her to help ensure that the interventions that she is offered are in line with her goals and values. We discussed her understanding of the concern for her cancer and the potential benefits and risks of the work-up stage at hand (c-scope). She noted that she had been afraid of the c-scope because she thought it was surgery which scared her. Her daughters noted that she has difficulty understanding nuanced subjects like this and would like to ensure that at least one daughter is present for any  future significant discussions and whenever any significant information is imparted. The patient noted that she would like her daughters to be decision makers in cases in which she cannot make her own decisions. The daughters reported that they accepted this noting that while they had different views (one favors aggressive curative measures noting that she has a lot of geovanna in ability to heal, while another noted that she had seen a loved one go through metastatic cancer management and felt that depending on the situation a non-curative approach could provide value), but felt that they would be able to form a consensus and were willing to guide their mother's care. We discussed that the c-scope is not surgery, and started to delve into what ways the results of the scope may or may not . The patient and her daughters opted to try the c-scope to get more information at the outpatient follow up. They were interested in continued palliative involvement including potentially in that outpatient setting.

## 2019-07-31 NOTE — PT/OT/SLP EVAL
Speech Language Pathology Evaluation  Cognitive Communication    Patient Name:  Kayy Espinal   MRN:  [id number]  Admitting Diagnosis: Encephalopathy, metabolic    Recommendations:     Recommendations:                General Recommendations:  Cognitive-linguistic therapy  Diet recommendations:  Regular, Thin   Aspiration Precautions: Standard aspiration precautions   General Precautions: Standard, fall  Communication strategies:  go to room if call light pushed    History:     Past Medical History:   Diagnosis Date    Asthma     Chronic anemia     Diabetes mellitus     Hypertension     Stroke        Past Surgical History:   Procedure Laterality Date    ESOPHAGOGASTRODUODENOSCOPY (EGD) N/A 2019    Performed by Wilber Duarte MD at [facility] ENDO       Social History: Patient lives by herself in [place]. Her oldest daughter lives next door.    Occupation/hobbies/homemaking: Patient is retired from working as a . She enjoys cooking and cleaning around her house.     Subjective     Patient awake and alert during session  Communicated with nurse prior to session     Pain/Comfort:  · Pain Rating 1: 0/10  · Pain Rating Post-Intervention 1: 0/10    Objective:   Cognitive Status:    Arousal/Alertness Appropriate response to stimuli  Attention Sustained attention deficit as session progressed, but easily redirected  Orientation Person and Time (thought she was in [place])  Memory Immediate Recall: max assist for immediate recall throughout assessmen, frequently requiring repetition and Delayed Recall: indepedently recalled 0/3 unrelated objects with a 5-minute delay, increasing to 2/3 with mod assist  Problem Solving Conclusions: 1/3 with max assist, Sequencin/3 with max assist and Solutions: 0/2 with mod assist  Simple calculation 0/2 with simple time/money management and mod assist  Reasoning Cause/effect comment max assist      Receptive Language:   Comprehension:   Questions Simple yes/no:  "WFL  Complex yes/no: WFL  Commands One step: WFL  two step basic commands: independently 2/5, increasing to 3/5 with mod assist    Pragmatics:    WFL    Expressive Language:  Verbal:    Repetition Phrases WFL  Naming Confrontation: WFL and Single word responsive naming: WFL  **Higher level of assessment appropriate      Motor Speech:  Patient with baseline dysarthria associated with prior CVA; she reported that her speech, "doesn't sound any different from before."    Voice:   WFL    Visual-Spatial:  TBA    Reading:   TBA     Written Expression:   TBA    Treatment: Patient was seen for a bedside swallow eval. She was sitting up in bed with no family present during session. Throughout session, patient demonstrated limited insight into deficits and stated that she was at baseline. SLP educated patient regarding role of ST in her POC, but she would benefit from ongoing instruction.    Assessment:   Kayy Espinal is a 63 y.o. female with an SLP diagnosis of Dysarthria and Cognitive-Linguistic Impairment.     Goals:   Multidisciplinary Problems     SLP Goals        Problem: SLP Goal    Goal Priority Disciplines Outcome   SLP Goal     SLP    Description:  Speech-Language Pathology Goals  Goals to be met by 8/7/19  1. Patient will independently orient x4.   2. Patient will recall 3/4 unrelated objects with a 5-minute delay and max assist.   3. Patient will follow 2-step commands with 80% accuracy and min assist.   4. Patient will answer simple problem solving/safety awareness questions with 75% accuracy and mod assist.   5. Patient will answer immediate recall questions with 75% accuracy and mod assist.  6. Patient will participate in ongoing assessment of cognitive-linguistic skills.   7. Patient will participate in ongoing assessment of reading/writing/visuospatial skills.  8. Educate patient and family regarding ST in patient's POC.                    Plan:   · Patient to be seen:  3 x/week   · Plan of Care expires:  " 08/30/19  · Plan of Care reviewed with:  patient   · SLP Follow-Up:  Yes       Discharge recommendations:  Discharge Facility/Level of Care Needs: rehabilitation facility   Barriers to Discharge:  Level of Skilled Assistance Needed     Time Tracking:   SLP Treatment Date:   07/31/19  Speech Start Time:  0921  Speech Stop Time:  0941     Speech Total Time (min):  20 min    Billable Minutes: Eval 20     Anthony Machado CCC-SLP  Speech-Language Pathology  Pager: [phone]   07/31/2019

## 2019-07-31 NOTE — PLAN OF CARE
Problem: Adult Inpatient Plan of Care  Goal: Plan of Care Review  Outcome: Ongoing (interventions implemented as appropriate)  Pt has family at bs. Pt consumed appx half of bowel prep. Pt has had multiple episodes of emesis. Pt sipping bowel prep as tolerated. Pt given prn zofran x2. Pt given po tylenol for pain. Pt had episode of emesis immediately following. MD notified. Abd xray performed this shift. Pt has remained free from injury this shift. Bed in low locked position. Call light and personal belongings within reach. Side rails up x2. Nonskid socks in place. Pt instructed to call with any needs. Will continue to monitor.

## 2019-07-31 NOTE — ASSESSMENT & PLAN NOTE
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     - MRI head 7/29 with concern for metastasis  - GI consulted, planning on c-scope for evaluation   -Discussed with daughters at bedside

## 2019-07-31 NOTE — PLAN OF CARE
[facility]    HOME HEALTH ORDERS  FACE TO FACE ENCOUNTER    Patient Name: Kayy Espinal  YOB: 1955    PCP: Enrique Dos Santos MD   PCP Address: [address] 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       No future appointments.        I have seen and examined this patient face to face today. My clinical findings that support the need for the home health skilled services and home bound status are the following:  Weakness/numbness causing balance and gait disturbance due to Malignancy/Cancer making it taxing to leave home.  Medical restrictions requiring assistance of another human to leave home due to  Unsteady gait .    Allergies:Review of patient's allergies indicates:  No Known Allergies    Diet: regular diet    Activities: activity as tolerated    Nursing:   SN to complete comprehensive assessment including routine vital signs. Instruct on disease process and s/s of complications to report to MD. Review/verify medication list sent home with the patient at time of discharge  and instruct patient/caregiver as needed. Frequency may be adjusted depending on start of care date.    Notify MD if SBP > 160 or < 90; DBP > 90 or < 50; HR > 120 or < 50; Temp > 101; Other:   NA      CONSULTS:    Physical Therapy to evaluate and treat. Evaluate for home safety and equipment needs; Establish/upgrade home exercise program. Perform / instruct on therapeutic exercises, gait training, transfer training, and Range of  Motion.  Occupational Therapy to evaluate and treat. Evaluate home environment for safety and equipment needs. Perform/Instruct on transfers, ADL training, ROM, and therapeutic exercises.    MISCELLANEOUS CARE:  N/A    WOUND CARE ORDERS  n/a      Medications: Review discharge medications with patient and family and provide education.      Current Discharge Medication List      START taking these medications    Details   amLODIPine (NORVASC) 10 MG tablet Take 1 tablet (10 mg total) by mouth once daily.  Qty: 30 tablet, Refills: 11         CONTINUE these medications which have CHANGED    Details   !! atorvastatin (LIPITOR) 40 MG tablet Take 1 tablet (40 mg total) by mouth once daily.  Qty: 90 tablet, Refills: 0      losartan (COZAAR) 50 MG tablet Take 1 tablet (50 mg total) by mouth once daily.  Qty: 90 tablet, Refills: 0       !! - Potential duplicate medications found. Please discuss with provider.      CONTINUE these medications which have NOT CHANGED    Details   !! atorvastatin (LIPITOR) 40 MG tablet Take 40 mg by mouth once daily.      esomeprazole (NEXIUM) 20 MG capsule Take 1 capsule (20 mg total) by mouth before breakfast.  Qty: 30 capsule, Refills: 11       !! - Potential duplicate medications found. Please discuss with provider.        I certify that this patient is confined to her home and needs physical therapy and occupational therapy.

## 2019-08-01 PROBLEM — E87.1 HYPONATREMIA: Status: ACTIVE | Noted: 2019-08-01

## 2019-08-01 PROBLEM — D50.0 IRON DEFICIENCY ANEMIA DUE TO CHRONIC BLOOD LOSS: Status: RESOLVED | Noted: 2019-06-11 | Resolved: 2019-08-01

## 2019-08-01 LAB
ALBUMIN SERPL BCP-MCNC: 2 G/DL (ref 3.5–5.2)
ALP SERPL-CCNC: 139 U/L (ref 55–135)
ALT SERPL W/O P-5'-P-CCNC: 7 U/L (ref 10–44)
ANION GAP SERPL CALC-SCNC: 11 MMOL/L (ref 8–16)
AST SERPL-CCNC: 44 U/L (ref 10–40)
BASOPHILS # BLD AUTO: 0.05 K/UL (ref 0–0.2)
BASOPHILS NFR BLD: 0.4 % (ref 0–1.9)
BILIRUB SERPL-MCNC: 0.8 MG/DL (ref 0.1–1)
BUN SERPL-MCNC: 12 MG/DL (ref 8–23)
CALCIUM SERPL-MCNC: 9 MG/DL (ref 8.7–10.5)
CHLORIDE SERPL-SCNC: 92 MMOL/L (ref 95–110)
CO2 SERPL-SCNC: 29 MMOL/L (ref 23–29)
CREAT SERPL-MCNC: 0.8 MG/DL (ref 0.5–1.4)
DIFFERENTIAL METHOD: ABNORMAL
EOSINOPHIL # BLD AUTO: 0 K/UL (ref 0–0.5)
EOSINOPHIL NFR BLD: 0.1 % (ref 0–8)
ERYTHROCYTE [DISTWIDTH] IN BLOOD BY AUTOMATED COUNT: 25.2 % (ref 11.5–14.5)
EST. GFR  (AFRICAN AMERICAN): >60 ML/MIN/1.73 M^2
EST. GFR  (NON AFRICAN AMERICAN): >60 ML/MIN/1.73 M^2
GLUCOSE SERPL-MCNC: 134 MG/DL (ref 70–110)
GLUCOSE SERPL-MCNC: 90 MG/DL (ref 70–110)
HCT VFR BLD AUTO: 28.4 % (ref 37–48.5)
HGB BLD-MCNC: 8.7 G/DL (ref 12–16)
IMM GRANULOCYTES # BLD AUTO: 0.08 K/UL (ref 0–0.04)
IMM GRANULOCYTES NFR BLD AUTO: 0.6 % (ref 0–0.5)
LYMPHOCYTES # BLD AUTO: 1 K/UL (ref 1–4.8)
LYMPHOCYTES NFR BLD: 7.5 % (ref 18–48)
MAGNESIUM SERPL-MCNC: 1.8 MG/DL (ref 1.6–2.6)
MCH RBC QN AUTO: 22.1 PG (ref 27–31)
MCHC RBC AUTO-ENTMCNC: 30.6 G/DL (ref 32–36)
MCV RBC AUTO: 72 FL (ref 82–98)
MONOCYTES # BLD AUTO: 1.3 K/UL (ref 0.3–1)
MONOCYTES NFR BLD: 9.7 % (ref 4–15)
NEUTROPHILS # BLD AUTO: 10.9 K/UL (ref 1.8–7.7)
NEUTROPHILS NFR BLD: 81.7 % (ref 38–73)
NRBC BLD-RTO: 0 /100 WBC
PHOSPHATE SERPL-MCNC: 4.2 MG/DL (ref 2.7–4.5)
PLATELET # BLD AUTO: 565 K/UL (ref 150–350)
PMV BLD AUTO: 10.3 FL (ref 9.2–12.9)
POCT GLUCOSE: 106 MG/DL (ref 70–110)
POCT GLUCOSE: 114 MG/DL (ref 70–110)
POCT GLUCOSE: 136 MG/DL (ref 70–110)
POTASSIUM SERPL-SCNC: 3.6 MMOL/L (ref 3.5–5.1)
PROT SERPL-MCNC: 6.7 G/DL (ref 6–8.4)
RBC # BLD AUTO: 3.94 M/UL (ref 4–5.4)
SODIUM SERPL-SCNC: 132 MMOL/L (ref 136–145)
WBC # BLD AUTO: 13.36 K/UL (ref 3.9–12.7)

## 2019-08-01 PROCEDURE — 45380 PR COLONOSCOPY,BIOPSY: ICD-10-PCS | Mod: ,,, | Performed by: INTERNAL MEDICINE

## 2019-08-01 PROCEDURE — 36415 COLL VENOUS BLD VENIPUNCTURE: CPT

## 2019-08-01 PROCEDURE — 97535 SELF CARE MNGMENT TRAINING: CPT

## 2019-08-01 PROCEDURE — 99232 PR SUBSEQUENT HOSPITAL CARE,LEVL II: ICD-10-PCS | Mod: ,,, | Performed by: EMERGENCY MEDICINE

## 2019-08-01 PROCEDURE — 88342 TISSUE SPECIMEN TO PATHOLOGY - SURGERY: ICD-10-PCS | Mod: 26,,, | Performed by: PATHOLOGY

## 2019-08-01 PROCEDURE — 82962 GLUCOSE BLOOD TEST: CPT | Performed by: INTERNAL MEDICINE

## 2019-08-01 PROCEDURE — 88341 IMHCHEM/IMCYTCHM EA ADD ANTB: CPT | Mod: 26,,, | Performed by: PATHOLOGY

## 2019-08-01 PROCEDURE — 99233 PR SUBSEQUENT HOSPITAL CARE,LEVL III: ICD-10-PCS | Mod: ,,, | Performed by: HOSPITALIST

## 2019-08-01 PROCEDURE — D9220A PRA ANESTHESIA: ICD-10-PCS | Mod: CRNA,,, | Performed by: NURSE ANESTHETIST, CERTIFIED REGISTERED

## 2019-08-01 PROCEDURE — 97116 GAIT TRAINING THERAPY: CPT

## 2019-08-01 PROCEDURE — 63600175 PHARM REV CODE 636 W HCPCS: Performed by: NURSE ANESTHETIST, CERTIFIED REGISTERED

## 2019-08-01 PROCEDURE — D9220A PRA ANESTHESIA: Mod: CRNA,,, | Performed by: NURSE ANESTHETIST, CERTIFIED REGISTERED

## 2019-08-01 PROCEDURE — 83735 ASSAY OF MAGNESIUM: CPT

## 2019-08-01 PROCEDURE — 88305 TISSUE SPECIMEN TO PATHOLOGY - SURGERY: ICD-10-PCS | Mod: 26,,, | Performed by: PATHOLOGY

## 2019-08-01 PROCEDURE — 88342 IMHCHEM/IMCYTCHM 1ST ANTB: CPT | Mod: 26,,, | Performed by: PATHOLOGY

## 2019-08-01 PROCEDURE — 27201012 HC FORCEPS, HOT/COLD, DISP: Performed by: INTERNAL MEDICINE

## 2019-08-01 PROCEDURE — 88305 TISSUE EXAM BY PATHOLOGIST: CPT | Mod: 26,,, | Performed by: PATHOLOGY

## 2019-08-01 PROCEDURE — 88341 PR IHC OR ICC EACH ADD'L SINGLE ANTIBODY  STAINPR: ICD-10-PCS | Mod: 26,,, | Performed by: PATHOLOGY

## 2019-08-01 PROCEDURE — 80053 COMPREHEN METABOLIC PANEL: CPT

## 2019-08-01 PROCEDURE — 99232 SBSQ HOSP IP/OBS MODERATE 35: CPT | Mod: ,,, | Performed by: EMERGENCY MEDICINE

## 2019-08-01 PROCEDURE — 88342 IMHCHEM/IMCYTCHM 1ST ANTB: CPT | Performed by: PATHOLOGY

## 2019-08-01 PROCEDURE — 37000009 HC ANESTHESIA EA ADD 15 MINS: Performed by: INTERNAL MEDICINE

## 2019-08-01 PROCEDURE — 94761 N-INVAS EAR/PLS OXIMETRY MLT: CPT

## 2019-08-01 PROCEDURE — 88305 TISSUE EXAM BY PATHOLOGIST: CPT | Performed by: PATHOLOGY

## 2019-08-01 PROCEDURE — 45380 COLONOSCOPY AND BIOPSY: CPT | Mod: ,,, | Performed by: INTERNAL MEDICINE

## 2019-08-01 PROCEDURE — D9220A PRA ANESTHESIA: Mod: ANES,,, | Performed by: ANESTHESIOLOGY

## 2019-08-01 PROCEDURE — 37000008 HC ANESTHESIA 1ST 15 MINUTES: Performed by: INTERNAL MEDICINE

## 2019-08-01 PROCEDURE — 97530 THERAPEUTIC ACTIVITIES: CPT

## 2019-08-01 PROCEDURE — 63600175 PHARM REV CODE 636 W HCPCS: Performed by: STUDENT IN AN ORGANIZED HEALTH CARE EDUCATION/TRAINING PROGRAM

## 2019-08-01 PROCEDURE — 20600001 HC STEP DOWN PRIVATE ROOM

## 2019-08-01 PROCEDURE — 84100 ASSAY OF PHOSPHORUS: CPT

## 2019-08-01 PROCEDURE — 45380 COLONOSCOPY AND BIOPSY: CPT | Performed by: INTERNAL MEDICINE

## 2019-08-01 PROCEDURE — D9220A PRA ANESTHESIA: ICD-10-PCS | Mod: ANES,,, | Performed by: ANESTHESIOLOGY

## 2019-08-01 PROCEDURE — 99233 SBSQ HOSP IP/OBS HIGH 50: CPT | Mod: ,,, | Performed by: HOSPITALIST

## 2019-08-01 PROCEDURE — 92507 TX SP LANG VOICE COMM INDIV: CPT

## 2019-08-01 PROCEDURE — 85025 COMPLETE CBC W/AUTO DIFF WBC: CPT

## 2019-08-01 RX ORDER — ONDANSETRON 2 MG/ML
INJECTION INTRAMUSCULAR; INTRAVENOUS
Status: DISCONTINUED | OUTPATIENT
Start: 2019-08-01 | End: 2019-08-01

## 2019-08-01 RX ORDER — POLYETHYLENE GLYCOL 3350, SODIUM SULFATE ANHYDROUS, SODIUM BICARBONATE, SODIUM CHLORIDE, POTASSIUM CHLORIDE 236; 22.74; 6.74; 5.86; 2.97 G/4L; G/4L; G/4L; G/4L; G/4L
4000 POWDER, FOR SOLUTION ORAL ONCE
Status: DISCONTINUED | OUTPATIENT
Start: 2019-08-01 | End: 2019-08-02

## 2019-08-01 RX ORDER — LIDOCAINE HCL/PF 100 MG/5ML
SYRINGE (ML) INTRAVENOUS
Status: DISCONTINUED | OUTPATIENT
Start: 2019-08-01 | End: 2019-08-01

## 2019-08-01 RX ORDER — PHENYLEPHRINE HYDROCHLORIDE 10 MG/ML
INJECTION INTRAVENOUS
Status: DISCONTINUED | OUTPATIENT
Start: 2019-08-01 | End: 2019-08-01

## 2019-08-01 RX ORDER — ONDANSETRON 2 MG/ML
4 INJECTION INTRAMUSCULAR; INTRAVENOUS EVERY 6 HOURS PRN
Status: DISCONTINUED | OUTPATIENT
Start: 2019-08-01 | End: 2019-08-02 | Stop reason: HOSPADM

## 2019-08-01 RX ORDER — SODIUM CHLORIDE 9 MG/ML
INJECTION, SOLUTION INTRAVENOUS CONTINUOUS PRN
Status: DISCONTINUED | OUTPATIENT
Start: 2019-08-01 | End: 2019-08-01

## 2019-08-01 RX ORDER — PROPOFOL 10 MG/ML
VIAL (ML) INTRAVENOUS CONTINUOUS PRN
Status: DISCONTINUED | OUTPATIENT
Start: 2019-08-01 | End: 2019-08-01

## 2019-08-01 RX ORDER — PROPOFOL 10 MG/ML
VIAL (ML) INTRAVENOUS
Status: DISCONTINUED | OUTPATIENT
Start: 2019-08-01 | End: 2019-08-01

## 2019-08-01 RX ADMIN — ONDANSETRON 4 MG: 2 INJECTION INTRAMUSCULAR; INTRAVENOUS at 03:08

## 2019-08-01 RX ADMIN — PHENYLEPHRINE HYDROCHLORIDE 100 MCG: 10 INJECTION INTRAVENOUS at 03:08

## 2019-08-01 RX ADMIN — SODIUM CHLORIDE: 0.9 INJECTION, SOLUTION INTRAVENOUS at 03:08

## 2019-08-01 RX ADMIN — PROPOFOL 30 MG: 10 INJECTION, EMULSION INTRAVENOUS at 03:08

## 2019-08-01 RX ADMIN — PROPOFOL 100 MCG/KG/MIN: 10 INJECTION, EMULSION INTRAVENOUS at 03:08

## 2019-08-01 RX ADMIN — LIDOCAINE HYDROCHLORIDE 40 MG: 20 INJECTION, SOLUTION INTRAVENOUS at 03:08

## 2019-08-01 RX ADMIN — ONDANSETRON 4 MG: 2 INJECTION INTRAMUSCULAR; INTRAVENOUS at 12:08

## 2019-08-01 RX ADMIN — ONDANSETRON 4 MG: 2 INJECTION INTRAMUSCULAR; INTRAVENOUS at 05:08

## 2019-08-01 NOTE — INTERVAL H&P NOTE
Pre-Procedure H and P Addendum    Patient seen and examined.  History and exam unchanged from prior history and physical.      Procedure: Colonoscopy   Indication: Cecal mass  ASA Class: per anesthesiology  Airway: normal  Neck Mobility: full range of motion  Mallampatti score: per anesthesia  History of anesthesia problems: no  Family history of anesthesia problems: no  Anesthesia Plan: MAC    Anesthesia/Surgery risks, benefits and alternative options discussed and understood by patient/family.          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

## 2019-08-01 NOTE — NURSING TRANSFER
Nursing Transfer Note      8/1/2019     Transfer To: Room 803A    Transfer via stretcher    Transfer with none    Transported by pct    Medicines sent: none    Chart send with patient: Yes    Notified: Sera,CORA    Patient reassessed at:Now and at time of arrival    Upon arrival to floor: patient oriented to room, call bell in reach and bed in lowest position

## 2019-08-01 NOTE — PT/OT/SLP PROGRESS
Physical Therapy Treatment    Patient Name:  Kayy Espinal   MRN:  [id number]    Recommendations:     Discharge Recommendations:  rehabilitation facility   Discharge Equipment Recommendations: (TBD)   Barriers to discharge: Decreased caregiver support    Assessment:     Kayy Espinal is a 63 y.o. female admitted with a medical diagnosis of Encephalopathy, metabolic.  She presents with the following impairments/functional limitations:  weakness, impaired endurance, impaired self care skills, gait instability, impaired functional mobilty, impaired balance, decreased lower extremity function, decreased upper extremity function, impaired cognition, decreased safety awareness, impaired fine motor . Pt limited due to reports not feeling well and increased fear of falling. Pt would continue to benefit from skilled PT to address overall functional mobility and goals. Goals remain appropriate      Rehab Prognosis: Good; patient would benefit from acute skilled PT services to address these deficits and reach maximum level of function.    Recent Surgery: Procedure(s) (LRB):  COLONOSCOPY (N/A) Day of Surgery    Plan:     During this hospitalization, patient to be seen 3 x/week to address the identified rehab impairments via gait training, therapeutic activities, therapeutic exercises, neuromuscular re-education and progress toward the following goals:    · Plan of Care Expires:  08/24/19    Subjective     Chief Complaint: I don't feel well, I don't want to fall    Pain/Comfort:  · Pain Rating 1: 0/10  · Pain Rating Post-Intervention 1: 0/10      Objective:     Communicated with RN prior to session.  Patient found supine with   upon PT entry to room.     General Precautions: Standard, fall   Orthopedic Precautions:N/A   Braces: N/A     Functional Mobility:  · Bed Mobility:     · Rolling Right: contact guard assistance  · Scooting: stand by assistance  · Supine to Sit: minimum assistance  · Sit to Supine: minimum  assistance  · Transfers:     § Sit to Stand:  minimum assistance with hand-held assist from EOB and modA from commode x 3 trials, pt with increased fear of falling  · Gait: 10ft x 2  c HHAx1 mod A for balance and safety  § Unsteadiness, shuffled gait, L lateral lean, posterior lean, c/o fatigue and increased fear of falling     AM-PAC 6 CLICK MOBILITY  Turning over in bed (including adjusting bedclothes, sheets and blankets)?: 3  Sitting down on and standing up from a chair with arms (e.g., wheelchair, bedside commode, etc.): 3  Moving from lying on back to sitting on the side of the bed?: 3  Moving to and from a bed to a chair (including a wheelchair)?: 3  Need to walk in hospital room?: 2  Climbing 3-5 steps with a railing?: 2  Basic Mobility Total Score: 16       Therapeutic Activities and Exercises:   educated patient on progress, safety,d/c,PT POC, on the effects of prolonged immobility and the importance of performing OOB activity and exercises to promote healing and reduce recovery time   Updated white board with appropriate PT mobility information for medical team notification  Donned an extra gown   Bedside table in front of patient and area set up for function, convenience, and safety. RN aware of patient's mobility needs and status. Questions/concerns addressed within PTA scope of practice; patient with no further questions. Time was provided for active listening, discussion of health disposition, and discussion of safe discharge. Pt?verbalized?agreement .      Patient left supine with all lines intact, call button in reach, nsg notified and family present..    GOALS:   Multidisciplinary Problems     Physical Therapy Goals        Problem: Physical Therapy Goal    Goal Priority Disciplines Outcome Goal Variances Interventions   Physical Therapy Goal     PT, PT/OT Ongoing (interventions implemented as appropriate)     Description:  Goals to be met by: 8/19/2019     Patient will increase functional  independence with mobility by performin. Supine to sit with Modified [place]  2. Sit to supine with Modified [place]  3. Sit to stand transfer with Contact Guard Assistance  4. Bed to chair transfer with Contact Guard Assistance using Quad Cane  5. Gait  x 50 feet with Contact Guard Assistance using Quad Cane.                       Time Tracking:     PT Received On: 19  PT Start Time: 958     PT Stop Time: 1022  PT Total Time (min): 24 min     Billable Minutes: Gait Training 14 and Therapeutic Activity 10    Treatment Type: Treatment  PT/PTA: PTA     PTA Visit Number: 1     Mickey White PTA  2019

## 2019-08-01 NOTE — TREATMENT PLAN
GI Treatment Plan  08/01/2019  4:32 PM    Colon completed with impression and recs below.    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 Biopsied.  - One 5 mm polyp in the cecum. Resection not attempted.  - One 8 mm polyp in the ascending colon. Resection not attempted.  - Diverticulosis in the left colon. There was no evidence of diverticular bleeding.  - The examination was otherwise normal.    Recommendation:       - Return [facility] to [facility] for [facility] care.  - Advance diet as tolerated.  - Continue present medications.  - Await pathology results.  - Refer to an oncologist.  - No recommendation at this time regarding repeat colonoscopy.  - We thank you for this consultation, we will sign off at this time    Gagandeep Scott M.D.  Gastroenterology Fellow, PGY-VI  Pager: [phone]  [facility]

## 2019-08-01 NOTE — PLAN OF CARE
Palliative Care Social Work   Assessment  Name: Kayy Espinal  MRN: [id number]  Date of Birth/Age:  1955  Sex: female  Ethnicity: Black or     Primary Language:English   Needed: no    Attending Physician: Dr. Montiel  Reason for Referral: assistance with clarification of goals of care  Consult Order Date: 8/1/19  Primary CM/SW: Elvin Chase JANIE    Palliative Care Provider: Dr. JH Glass    Present during Interview: pt, pt's dtr Crissy and dtr Paula with this SW    Past & Current Medical Situation:   Diagnosis: Encephalopathy, metabolic  PMH:   Past Medical History:   Diagnosis Date    Asthma     Chronic anemia     Diabetes mellitus     Hypertension     Stroke      Mental Health/Substance Use History:n/a  Non-traditional Health practices:     Understanding of diagnosis and prognosis: Will benefit from continued support regarding dx and px  Experience/Comfort level with health care system:    Patients Mental Status: alert and oriented    Socio-Economic Factors/Resources:  Address: [address] [place] [address] [address] [place] [id number]  Phone Number: [phone] (home)     Marital Status: Single  Household Composition: Lives alone  Children: 5 children: 3 daughters and 2 sons  Relationships with Family: Main support are pt's three daughters. Pt's oldest daughter Aster lives right next door. Pt's other daughters Crissy and Tammy live nearby and are available to assist.    Per pt's daughters, pt's two sons Jose and Shamar are not local and not involved.    Pt has multiple grandchildren and great grandchildren.     Emergency Contacts:   Dtr: Aster: [phone]  Dtr: Tammy: [phone]  Dtr: Crissy: [phone]  Sister: Modesta Vicente: [phone]    Activities of Daily Living:indepenent. Dtr helps with IADLs like medication management and bill paying.   Support Systems-Family & Community (Home Health, HME etc):  Acts HH in past.    Transportation:  yes    Work/Education History: Pt worked in  "a "bar-room"    History: no    Financial Resources:Medicare. Medicaid      Advanced Care Planning & Legal Concerns:   Advanced Directives/Living Will: no Provided for review by Dr. Glass   Planning:  no    Power of : no Dr. Glass provided at previous visit. Pt informed Dr. Glass that she wants her daughters to make medical decisions  Surrogate Decision Maker:       Spirituality, Culture & Coping Mechanisms:  F- Shanti and Belief: Gnosticist   I - Importance: Has strong shanti. Use to go to [employer] in past    C - Community/Culture Values:     A - Address in Care: Spiritual Care to follow      Strengths/Coping Strategies: has shanti, family supportive  Self-Care Activities/Hobbies: enjoys [employer].     Goals/Hopes/Expectations: hoping to return with family  Fears/Anxiety/Concerns: Concern for independence.        Preferences about EOL Environment: (own bed, family nearby, pets, music, etc)  tbd    Complicated Bereavement Risk Assessment Tool (CBRAT)  Reference:  [facility] Pall[facility] Clinical Practice Group (May 2016). Bereavement Risk Screening and Management Guidelines.  Retrieved from: http://www.grpcc.com.au/wp-content/uploads//LAMZG-Enpvbatngvm-Wxdkkirjq-and-Management-Guideline-2016.pdf      Client Characteristics (Bereaved Client)  ? Under 18      no  ? Was a Twin   no  ? Young Spouse   no  ? Elderly Spouse    no  ? Isolated    no  ? Lacks Meaningful Social Support   no  ? Dissatisfied with help available during illness   no  ? New to Financial [place] no  ? New to Decision-Making   no   History of Loss (Bereaved Client)  ? Cumulative Multiple Losses   no  ? Previous Mental Health Illnesses   no  ? Current Mental Health Illness   no  ? Other Significant Health Issues   no   ? Migrant/Refugee   no    Illness  ? Inherited Disorder   no  ? Stigmatized Disease in the   no  ?  Family/Community   no  ? Lengthy/Burdensome   no Relationship with "   ? Profound Lifelong Partner   no  ? Highly Dependent    no  ? Antagonistic   no  ? Ambivalent   yes  ? Deeply Connected   yes  ? Culturally Defined   no   Death  ? Sudden or Unexpected   no  ? Traumatic Circumstances Associated with Death   no  ? Significant Cultural/Social Burdens as a result of Death   no   Risk Factors Scores  0-2  Low  3-5  Moderate  5+  High  All persons scoring moderate to high presume to be at risk**    (** It is acknowledged that protective factors and resilience may outweigh apparent risk factors.      Total Risk Factors Score:       Will benefit from continued follow up and bereavement support. Pt's daughters are main supports and does not seem to have support from pt's sons.     Will continue to follow and provide support.          Discharge Planning Needs/Plan of Care:    Visit to bedside with Dr. Glass and returned alone. Pt's two daughters Crissy and Tammy present. Pt was attempting to drink liquid for colonoscopy.     Attempted to establish rapport at bedside. Pt's dtrs not forthcoming with additional information but answered questions appropriately.     Encouraged dtrs to perform healthy self-care. Will continue to follow and provide support and resources as appropriate.    Haydee Oliveira, MICHELW, ACHP-SW

## 2019-08-01 NOTE — PHYSICIAN QUERY
"PT Name: Kayy Espinal  MR #: [id number]    Physician Query Form - Hematology Clarification      CDS: Kaylan Herrera RN   Contact information:[email or web address]    This form is a permanent document in the medical record.      Query Date: August 1, 2019    By submitting this query, we are merely seeking further clarification of documentation. Please utilize your independent clinical judgment when addressing the question(s) below.    The Medical record contains the following:   Indicators  Supporting Clinical Findings Location in Medical Record   x "Anemia" documented 63-year-old female with history HTN, DM2, CVA,  Anemia, recent diagnosis of cecal mass with liver mets presented today with her family who says that over the past 2 weeks patient has been more forgetful    Chronic anemia  Cecal neoplasm  Recent diagnosis  (admitted 6/10/19-6/13/19) with anemia (Hb 4); EGD was unremarkable; CT a/p done which showed a bulky soft tissue mass seen in the cecum with irregular borders highly suspicious for cecal carcinoma along with enlarged liver containing numerous diffuse complex hypodensities highly suspicious for liver metastasis. HM POC 7/29          H&P 7/29   x H & H =  7/29 7/30 7/31 8/1   Hgb 8.5  8.7  8.7  8.7    Hct 29.7  30.2  30.2  28.4     Lab Results    BP =                     HR=      "GI bleeding" documented      Acute bleeding (Non GI site)      Transfusion(s)      Treatment:      Other:        Provider, please specify diagnosis or diagnoses associated with above clinical findings.      [X  ] Anemia of chronic disease ( Specify chronic disease)       [X  ] Neoplastic disease    [  ] Other (Specify):   [  ] Clinically Undetermined       [  ] Other Hematological Diagnosis (please specify):     [  ] Clinically Undetermined       Please document in your progress notes daily for the duration of treatment, until resolved, and include in your discharge summary.                                                   "

## 2019-08-01 NOTE — TRANSFER OF CARE
"Anesthesia Transfer of Care Note    Patient: Kayy Espinal    Procedure(s) Performed: Procedure(s) (LRB):  COLONOSCOPY (N/A)    Patient location: [facility]    Anesthesia Type: general    Transport from OR: Transported from OR on room air with adequate spontaneous ventilation    Post pain: adequate analgesia    Post assessment: tolerated procedure well and no apparent anesthetic complications    Post vital signs: stable    Level of consciousness: lethargic    Nausea/Vomiting: no nausea/vomiting    Complications: none    Transfer of care protocol was followed      Last vitals:   Visit Vitals  /68   Pulse 83   Temp 36.5 °C (97.7 °F) (Temporal)   Resp 13   Ht 5' 5" (1.651 m)   Wt 61.8 kg (136 lb 3.9 oz)   SpO2 99%   Breastfeeding? No   BMI 22.67 kg/m²     "

## 2019-08-01 NOTE — PHYSICIAN QUERY
PT Name: Kayy Espinal  MR #: [id number]     PHYSICIAN QUERY -  ELECTROLYTE CLARIFICATION      CDS: Kaylan Herrera RN    Contact information:[email or web address]    This form is a permanent document in the medical record.     Query Date: August 1, 2019    By submitting this query, we are merely seeking further clarification of documentation to reflect the severity of illness of your patient. Please utilize your independent clinical judgment when addressing the question(s) below.    The Medical record reflects the following:     Indicators   Supporting Clinical Findings Location in Medical Record   x Lab Value(s)  7/30 7/31   Mag 1.4  1.5     Lab Results   x Treatment                      Medication magnesium oxide tablet 400 mg   Ordered Dose: 400 mg   Route: Oral   Frequency: Once MAR- Given 7/31    Other       Provider, please specify the diagnosis or diagnoses that correspond(s) to the above indicators. Trip all that apply:    [ X  ] Hypomagnesemia   [   ] Other electrolyte disturbance (please specify): _______   [   ]  Clinically Undetermined       Please document in your progress notes daily for the duration of treatment until resolved, and include in your discharge summary.

## 2019-08-01 NOTE — [employer] PATIENT INSTRUCTIONS
Discharge Summary/Instructions after an Endoscopic Procedure  Patient Name: Kayy Espinal  Patient MRN: [id number]  Patient YOB: 1955 Thursday, August 01, 2019  Dov Null MD  RESTRICTIONS:  During your procedure today, you received medications for sedation.  These   medications may affect your judgment, balance and coordination.  Therefore,   for 24 hours, you have the following restrictions:   - DO NOT drive a car, operate machinery, make legal/financial decisions,   sign important papers or drink alcohol.    ACTIVITY:  Today: no heavy lifting, straining or running due to procedural   sedation/anesthesia.  The following day: return to full activity including work.  DIET:  Eat and drink normally unless instructed otherwise.     TREATMENT FOR COMMON SIDE EFFECTS:  - Mild abdominal pain, nausea, belching, bloating or excessive gas:  rest,   eat lightly and use a heating pad.  - Sore Throat: treat with throat lozenges and/or gargle with warm salt   water.  - Because air was used during the procedure, expelling large amounts of air   from your rectum or belching is normal.  - If a bowel prep was taken, you may not have a bowel movement for 1-3 days.    This is normal.  SYMPTOMS TO WATCH FOR AND REPORT TO YOUR PHYSICIAN:  1. Abdominal pain or bloating, other than gas cramps.  2. Chest pain.  3. Back pain.  4. Signs of infection such as: chills or fever occurring within 24 hours   after the procedure.  5. Rectal bleeding, which would show as bright red, maroon, or black stools.   (A tablespoon of blood from the rectum is not serious, especially if   hemorrhoids are present.)  6. Vomiting.  7. Weakness or dizziness.  GO DIRECTLY TO THE NEAREST EMERGENCY ROOM IF YOU HAVE ANY OF THE FOLLOWING:      Difficulty breathing              Chills and/or fever over 101 F   Persistent vomiting and/or vomiting blood   Severe abdominal pain   Severe chest pain   Black, tarry stools   Bleeding- more than one  tablespoon   Any other symptom or condition that you feel may need urgent attention  Your doctor recommends these additional instructions:  If any biopsies were taken, your doctors clinic will contact you in 1 to 2   weeks with any results.  - Return patient to [facility] for ongoing care.   - Advance diet as tolerated.   - Continue present medications.   - Await pathology results.   - Refer to an oncologist.   - No recommendation at this time regarding repeat colonoscopy.  For questions, problems or results please call your physician - Dov Null MD at Work:  [phone].  [facility], EMERGENCY ROOM PHONE NUMBER: [phone]  IF A COMPLICATION OR EMERGENCY SITUATION ARISES AND YOU ARE UNABLE TO REACH   YOUR PHYSICIAN - GO DIRECTLY TO THE EMERGENCY ROOM.  Dov Null MD  8/1/2019 4:28:30 PM  This report has been verified and signed electronically.  [employer]

## 2019-08-01 NOTE — PLAN OF CARE
Problem: Adult Inpatient Plan of Care  Goal: Plan of Care Review  Pt AAOX4. Patient NPO except for Golytely after midnight - has not tolerated Golytely well, patient have multiple episodes of vomitting after first half of Golytely. Patient required 2 doses of PRN zofran. Patient is continuing to drink Golytely and has been instructed to sip. Bowel movements loose/liquid and brown, not yet clear. Using bedside commode, up with assistance of family. Vital signs stable, patient afebrile. No complaints of pain. Will continue to monitor.

## 2019-08-01 NOTE — DISCHARGE INSTRUCTIONS
Colonoscopy     A camera attached to a flexible tube with a viewing lens is used to take video pictures.     Colonoscopy is a test to view the inside of your lower digestive tract (colon and rectum). Sometimes it can show the last part of the small intestine (ileum). During the test, small pieces of tissue may be removed for testing. This is called a biopsy. Small growths, such as polyps, may also be removed.   Why is colonoscopy done?  The test is done to help look for colon cancer. And it can help find the source of abdominal pain, bleeding, and changes in bowel habits. It may be needed once a year, depending on factors such as your:  · Age  · Health history  · Family health history  · Symptoms  · Results from any prior colonoscopy  Risks and possible complications  These include:  · Bleeding               · A puncture or tear in the colon   · Risks of anesthesia  · A cancer lesion not being seen  Getting ready   To prepare for the test:  · Talk with your healthcare provider about the risks of the test (see below). Also ask your healthcare provider about alternatives to the test.  · Tell your healthcare provider about any medicines you take. Also tell him or her about any health conditions you may have.  · Make sure your rectum and colon are empty for the test. Follow the diet and bowel prep instructions exactly. If you dont, the test may need to be rescheduled.  · Plan for a friend or family member to drive you home after the test.     Colonoscopy provides an inside view of the entire colon.     You may discuss the results with your doctor right away or at a future visit.  During the test   The test is usually done in the hospital on an outpatient basis. This means you go home the same day. The procedure takes about 30 minutes. During that time:  · You are given relaxing (sedating) medicine through an IV line. You may be drowsy, or fully asleep.  · The healthcare provider will first give you a physical exam to  check for anal and rectal problems.  · Then the anus is lubricated and the scope inserted.  · If you are awake, you may have a feeling similar to needing to have a bowel movement. You may also feel pressure as air is pumped into the colon. Its OK to pass gas during the procedure.  · Biopsy, polyp removal, or other treatments may be done during the test.  After the test   You may have gas right after the test. It can help to try to pass it to help prevent later bloating. Your healthcare provider may discuss the results with you right away. Or you may need to schedule a follow-up visit to talk about the results. After the test, you can go back to your normal eating and other activities. You may be tired from the sedation and need to rest for a few hours.  Date Last Reviewed: 11/1/2016  © 5508-0284 [employer]. [address] [place] [address]. All rights reserved. This information is not intended as a substitute for professional medical care. Always follow your healthcare professional's instructions.            Please follow-up with Oncologist after d/c for results of biopsy and possible treatment options.  Please follow-up with PCP in 1-2 weeks after discharge.

## 2019-08-01 NOTE — SUBJECTIVE & OBJECTIVE
Interval History: Pt seen at bedside with two dtrs present.  Tolerated clear liquid dinner last night without difficulty or emesis, however not tolerating golytely well with multiple episodes of emesis.  Six documented small BM.  Denies abd pain, + flautus    Past Medical History:   Diagnosis Date    Asthma     Chronic anemia     Diabetes mellitus     Hypertension     Stroke        Past Surgical History:   Procedure Laterality Date    ESOPHAGOGASTRODUODENOSCOPY (EGD) N/A 6/12/2019    Performed by Wilber Duarte MD at [facility] END[facility]       Review of patient's allergies indicates:  No Known Allergies    Medications:  Continuous Infusions:  Scheduled Meds:   amLODIPine  10 mg Oral Daily    atorvastatin  40 mg Oral Daily    losartan  50 mg Oral Daily    pantoprazole  40 mg Oral Daily    polyethylene glycol  4,000 mL Oral Once     PRN Meds:acetaminophen, Dextrose 10% Bolus, Dextrose 10% Bolus, glucagon (human recombinant), glucose, glucose, insulin aspart U-100, ondansetron, pneumoc 13-raymond conj-dip cr(PF), sodium chloride 0.9%    Family History     Problem Relation (Age of Onset)    Diabetes Mother    Hypertension Mother        Tobacco Use    Smoking status: Never Smoker    Smokeless tobacco: Never Used   Substance and Sexual Activity    Alcohol use: Not Currently    Drug use: Not Currently    Sexual activity: Not Currently       Review of Systems   Gastrointestinal: Negative for abdominal distention, abdominal pain, constipation, diarrhea and nausea.     Objective:     Vital Signs (Most Recent):  Temp: 97.6 °F (36.4 °C) (08/01/19 1451)  Pulse: 102 (08/01/19 1451)  Resp: 17 (08/01/19 1451)  BP: (!) 140/70 (08/01/19 1451)  SpO2: 100 % (08/01/19 1451) Vital Signs (24h Range):  Temp:  [97.4 °F (36.3 °C)-98.2 °F (36.8 °C)] 97.6 °F (36.4 °C)  Pulse:  [] 102  Resp:  [14-18] 17  SpO2:  [95 %-100 %] 100 %  BP: (108-140)/(60-75) 140/70     Weight: 61.8 kg (136 lb 3.9 oz)  Body mass index is 22.67 kg/m².    Review  of Symptoms  Symptom Assessment (ESAS 0-10 scale)   ESAS 0 1 2 3 4 5 6 7 8 9 10   Pain              Dyspnea              Anxiety              Nausea              Depression               Anorexia              Fatigue              Insomnia              Restlessness               Agitation              CAM / Delirium __ --  ___+   Constipation     __ --  ___+   Diarrhea           __ --  ___+  Bowel Management Plan (BMP): Yes    Comments:     Pain Assessment: denied    OME in 24 hours: 0    Performance Status: 90    ECOG Performance Status Grade: 1 - Ambulates, capable of light work    Physical Exam   Constitutional: No distress.   Temporal wasting   HENT:   Head: Normocephalic and atraumatic.   Eyes: EOM are normal.   Neck: No JVD present.   Cardiovascular: Normal rate, regular rhythm and normal heart sounds.   Pulmonary/Chest: Effort normal and breath sounds normal. No respiratory distress. She has no wheezes. She has no rales.   Abdominal: Soft. She exhibits no distension. There is no tenderness. There is no guarding.   Hypoactive bowel sounds   Neurological: She is alert.   R hemiplegia   Skin: Skin is warm and dry.   Psychiatric: Judgment normal.   Nursing note and vitals reviewed.      Significant Labs: All pertinent labs within the past 24 hours have been reviewed.  CBC:   Recent Labs   Lab 08/01/19  0409   WBC 13.36*   HGB 8.7*   HCT 28.4*   MCV 72*   *     BMP:  Recent Labs   Lab 08/01/19  0409   *   *   K 3.6   CL 92*   CO2 29   BUN 12   CREATININE 0.8   CALCIUM 9.0   MG 1.8     LFT:  Lab Results   Component Value Date    AST 44 (H) 08/01/2019    ALKPHOS 139 (H) 08/01/2019    BILITOT 0.8 08/01/2019     Albumin:   Albumin   Date Value Ref Range Status   08/01/2019 2.0 (L) 3.5 - 5.2 g/dL Final     Protein:   Total Protein   Date Value Ref Range Status   08/01/2019 6.7 6.0 - 8.4 g/dL Final     Lactic acid:   Lab Results   Component Value Date    LACTATE 2.4 (H) 07/29/2019    LACTATE 1.9  07/28/2019       Significant Imaging: I have reviewed all pertinent imaging results/findings within the past 24 hours.    Advance Care Planning   Advanced Directives::  Living Will: No  LaPOST: No  Do Not Resuscitate Status: No  Medical Power of : No, but stated that she would like her daughters to make decisions for her if she cannot, and is not     Decision-Making Capacity: Patient answered questions, Family answered questions       Living Arrangements: Lives alone, daughter lives next door helps with IADLs eg medication management, bill paying    Psychosocial/Cultural:  Patient's most important priorities:  She is an independent person who likes her own space, also values the support of her children    Patient's biggest concerns/fears:  TBD, has noted loss of independence, pain as concerns    Previous death/end of life care history:  Her father's mother had cancer    Patient's goals/hopes:  TBD, involved continued independence    Spiritual:     F- Shanti and Belief: yes    I - Importance: important  .  C - Community: going to Buddhist is important    A - Address in Care: [place]

## 2019-08-01 NOTE — PLAN OF CARE
Problem: SLP Goal  Goal: SLP Goal  Speech-Language Pathology Goals  Goals to be met by 8/7/19  1. Patient will independently orient x4.   2. Patient will recall 3/4 unrelated objects with a 5-minute delay and max assist.   3. Patient will follow 2-step commands with 80% accuracy and min assist.   4. Patient will answer simple problem solving/safety awareness questions with 75% accuracy and mod assist.   5. Patient will answer immediate recall questions with 75% accuracy and mod assist.  6. Patient will participate in ongoing assessment of cognitive-linguistic skills.   7. Patient will participate in ongoing assessment of reading/writing/visuospatial skills.  8. Educate patient and family regarding ST in patient's POC.   Patient seen for ongoing speech/language/cognitive therapy.     Anthony Machado CCC-SLP  Speech-Language Pathology  Pager: [phone]

## 2019-08-01 NOTE — PT/OT/SLP PROGRESS
Speech Language Pathology Treatment    Patient Name:  Kayy Espinal   MRN:  [id number]  Admitting Diagnosis: Encephalopathy, metabolic    Recommendations:                 General Recommendations:  Cognitive-linguistic therapy  Diet recommendations:  Regular, Liquid Diet Level: Thin   Aspiration Precautions: Standard aspiration precautions   General Precautions: Standard, fall  Communication strategies:  none    Subjective     Patient awake and alert during session  Communicated with nurse prior to session     Pain/Comfort:  · Pain Rating 1: 0/10  · Pain Rating Post-Intervention 1: 0/10    Objective:     Has the patient been evaluated by SLP for swallowing?   Yes  Keep patient NPO? No   Current Respiratory Status: room air      Patient seen for ongoing cognitive-linguistic therapy. She was sitting up in bed during session. Patient reported not feeling well 2' to drinking colonoscopy prep, but she was agreeable to therapy. Patient was oriented to person and place, but required max cueing for time. She independently answered three-step sequencing questions with 60% accuracy, increasing to 80% with mod assist. She answered simple problem solving questions with 66% accuracy and mod assist. She independently followed 2-step commands with 75% accuracy, but was significantly delayed in her response time. Patient was more focused in her conversation and exhibited improved mentation overall; however, she continued to demonstrate decreased insight into her deficits as she continued to report that she was at baseline for cognition. SLP educated her regarding POC from a ST perspective and she verbalized understanding. Patient left in bed with call light within reach.    Assessment:     Kayy Espinal is a 63 y.o. female with an SLP diagnosis of Cognitive-Linguistic Impairment.      Goals:   Multidisciplinary Problems     SLP Goals        Problem: SLP Goal    Goal Priority Disciplines Outcome   SLP Goal     SLP    Description:   Speech-Language Pathology Goals  Goals to be met by 8/7/19  1. Patient will independently orient x4.   2. Patient will recall 3/4 unrelated objects with a 5-minute delay and max assist.   3. Patient will follow 2-step commands with 80% accuracy and min assist.   4. Patient will answer simple problem solving/safety awareness questions with 75% accuracy and mod assist.   5. Patient will answer immediate recall questions with 75% accuracy and mod assist.  6. Patient will participate in ongoing assessment of cognitive-linguistic skills.   7. Patient will participate in ongoing assessment of reading/writing/visuospatial skills.  8. Educate patient and family regarding ST in patient's POC.                    Plan:     · Patient to be seen:  3 x/week   · Plan of Care expires:  08/30/19  · Plan of Care reviewed with:  patient   · SLP Follow-Up:  Yes       Discharge recommendations:  rehabilitation facility   Barriers to Discharge:  Level of Skilled Assistance Needed     Time Tracking:     SLP Treatment Date:   08/01/19  Speech Start Time:  1206  Speech Stop Time:  1225     Speech Total Time (min):  19 min    Billable Minutes: Treatment Swallowing Dysfunction 10 and Self Care/Home Management Training 9    Anthony Machado CCC-SLP  Speech-Language Pathology  Pager: [phone]   08/01/2019

## 2019-08-02 VITALS
TEMPERATURE: 98 F | RESPIRATION RATE: 18 BRPM | SYSTOLIC BLOOD PRESSURE: 119 MMHG | BODY MASS INDEX: 22.7 KG/M2 | DIASTOLIC BLOOD PRESSURE: 65 MMHG | OXYGEN SATURATION: 99 % | HEIGHT: 65 IN | HEART RATE: 96 BPM | WEIGHT: 136.25 LBS

## 2019-08-02 LAB
ALBUMIN SERPL BCP-MCNC: 2 G/DL (ref 3.5–5.2)
ALP SERPL-CCNC: 133 U/L (ref 55–135)
ALT SERPL W/O P-5'-P-CCNC: 9 U/L (ref 10–44)
ANION GAP SERPL CALC-SCNC: 12 MMOL/L (ref 8–16)
AST SERPL-CCNC: 45 U/L (ref 10–40)
BASOPHILS # BLD AUTO: 0.06 K/UL (ref 0–0.2)
BASOPHILS NFR BLD: 0.5 % (ref 0–1.9)
BILIRUB SERPL-MCNC: 0.7 MG/DL (ref 0.1–1)
BUN SERPL-MCNC: 11 MG/DL (ref 8–23)
CALCIUM SERPL-MCNC: 9.1 MG/DL (ref 8.7–10.5)
CHLORIDE SERPL-SCNC: 96 MMOL/L (ref 95–110)
CO2 SERPL-SCNC: 30 MMOL/L (ref 23–29)
CREAT SERPL-MCNC: 0.8 MG/DL (ref 0.5–1.4)
DIFFERENTIAL METHOD: ABNORMAL
EOSINOPHIL # BLD AUTO: 0.1 K/UL (ref 0–0.5)
EOSINOPHIL NFR BLD: 0.4 % (ref 0–8)
ERYTHROCYTE [DISTWIDTH] IN BLOOD BY AUTOMATED COUNT: 25 % (ref 11.5–14.5)
EST. GFR  (AFRICAN AMERICAN): >60 ML/MIN/1.73 M^2
EST. GFR  (NON AFRICAN AMERICAN): >60 ML/MIN/1.73 M^2
GLUCOSE SERPL-MCNC: 81 MG/DL (ref 70–110)
HCT VFR BLD AUTO: 30.4 % (ref 37–48.5)
HGB BLD-MCNC: 9.1 G/DL (ref 12–16)
IMM GRANULOCYTES # BLD AUTO: 0.07 K/UL (ref 0–0.04)
IMM GRANULOCYTES NFR BLD AUTO: 0.6 % (ref 0–0.5)
LYMPHOCYTES # BLD AUTO: 1.6 K/UL (ref 1–4.8)
LYMPHOCYTES NFR BLD: 13.2 % (ref 18–48)
MAGNESIUM SERPL-MCNC: 1.9 MG/DL (ref 1.6–2.6)
MCH RBC QN AUTO: 22.1 PG (ref 27–31)
MCHC RBC AUTO-ENTMCNC: 29.9 G/DL (ref 32–36)
MCV RBC AUTO: 74 FL (ref 82–98)
MONOCYTES # BLD AUTO: 1.9 K/UL (ref 0.3–1)
MONOCYTES NFR BLD: 16 % (ref 4–15)
NEUTROPHILS # BLD AUTO: 8.2 K/UL (ref 1.8–7.7)
NEUTROPHILS NFR BLD: 69.3 % (ref 38–73)
NRBC BLD-RTO: 0 /100 WBC
PHOSPHATE SERPL-MCNC: 3.2 MG/DL (ref 2.7–4.5)
PLATELET # BLD AUTO: 470 K/UL (ref 150–350)
PMV BLD AUTO: 10.5 FL (ref 9.2–12.9)
POCT GLUCOSE: 146 MG/DL (ref 70–110)
POTASSIUM SERPL-SCNC: 3.3 MMOL/L (ref 3.5–5.1)
PROT SERPL-MCNC: 6.8 G/DL (ref 6–8.4)
RBC # BLD AUTO: 4.12 M/UL (ref 4–5.4)
SODIUM SERPL-SCNC: 138 MMOL/L (ref 136–145)
WBC # BLD AUTO: 11.82 K/UL (ref 3.9–12.7)

## 2019-08-02 PROCEDURE — 83735 ASSAY OF MAGNESIUM: CPT

## 2019-08-02 PROCEDURE — 99232 SBSQ HOSP IP/OBS MODERATE 35: CPT | Mod: ,,, | Performed by: NURSE PRACTITIONER

## 2019-08-02 PROCEDURE — 85025 COMPLETE CBC W/AUTO DIFF WBC: CPT

## 2019-08-02 PROCEDURE — 99232 PR SUBSEQUENT HOSPITAL CARE,LEVL II: ICD-10-PCS | Mod: ,,, | Performed by: NURSE PRACTITIONER

## 2019-08-02 PROCEDURE — 25000003 PHARM REV CODE 250: Performed by: STUDENT IN AN ORGANIZED HEALTH CARE EDUCATION/TRAINING PROGRAM

## 2019-08-02 PROCEDURE — 84100 ASSAY OF PHOSPHORUS: CPT

## 2019-08-02 PROCEDURE — G0009 ADMIN PNEUMOCOCCAL VACCINE: HCPCS | Performed by: HOSPITALIST

## 2019-08-02 PROCEDURE — 90670 PCV13 VACCINE IM: CPT | Performed by: HOSPITALIST

## 2019-08-02 PROCEDURE — 63600175 PHARM REV CODE 636 W HCPCS: Performed by: HOSPITALIST

## 2019-08-02 PROCEDURE — 36415 COLL VENOUS BLD VENIPUNCTURE: CPT

## 2019-08-02 PROCEDURE — 80053 COMPREHEN METABOLIC PANEL: CPT

## 2019-08-02 PROCEDURE — 90471 IMMUNIZATION ADMIN: CPT | Performed by: HOSPITALIST

## 2019-08-02 RX ORDER — POTASSIUM CHLORIDE 20 MEQ/1
20 TABLET, EXTENDED RELEASE ORAL ONCE
Status: COMPLETED | OUTPATIENT
Start: 2019-08-02 | End: 2019-08-02

## 2019-08-02 RX ORDER — AMLODIPINE BESYLATE 10 MG/1
10 TABLET ORAL DAILY
Qty: 30 TABLET | Refills: 11 | Status: SHIPPED | OUTPATIENT
Start: 2019-08-02 | End: 2020-08-01

## 2019-08-02 RX ORDER — LOSARTAN POTASSIUM 50 MG/1
50 TABLET ORAL DAILY
Qty: 90 TABLET | Refills: 0 | Status: SHIPPED | OUTPATIENT
Start: 2019-08-02

## 2019-08-02 RX ORDER — ATORVASTATIN CALCIUM 40 MG/1
40 TABLET, FILM COATED ORAL DAILY
Qty: 90 TABLET | Refills: 0 | Status: SHIPPED | OUTPATIENT
Start: 2019-08-02

## 2019-08-02 RX ADMIN — AMLODIPINE BESYLATE 10 MG: 10 TABLET ORAL at 09:08

## 2019-08-02 RX ADMIN — ATORVASTATIN CALCIUM 40 MG: 20 TABLET, FILM COATED ORAL at 09:08

## 2019-08-02 RX ADMIN — POTASSIUM CHLORIDE 20 MEQ: 20 TABLET, EXTENDED RELEASE ORAL at 09:08

## 2019-08-02 RX ADMIN — PANTOPRAZOLE SODIUM 40 MG: 40 TABLET, DELAYED RELEASE ORAL at 09:08

## 2019-08-02 RX ADMIN — LOSARTAN POTASSIUM 50 MG: 25 TABLET, FILM COATED ORAL at 09:08

## 2019-08-02 NOTE — ASSESSMENT & PLAN NOTE
Kayy Espinal is a 63 y.o. female with a past medical history significant for CVA with residual R sided weakness and admission 6/10/19-6/13/19 for anemia work up which found a mass highly suspicious for cecal carcinoma along with enlarged liver containing numerous diffuse complex hypodensities highly suspicious for liver metastasis. She presented to outside ED 2019/7/28 with family reporting progressive confusion and weight loss for the preceeding 2 weeks, was found to have acute petechial hemorrhage of the right inferomedial cerebellum and was transferred to [facility] for neurosurgery evaluation, concerning for metastases. As cognitive functioning improved the patient voiced that she did not wish to continue work up, primary team noted that she declined colonoscopy for biopsy, understands risks of not undergoing biopsy and getting treatment, and was interested in talking to palliative care about comfort measures at home, so Palliative care team was consulted to discuss Goals of Care.    8/1 Followed up with patient and daughters who stayed the night. Planning to proceed with colonoscopy with plans today of obtaining more information to help make decisions as an outpt.  Pal care LCSW also present and also spent time with family.  Pt is looking forward to going home soon. They would be open to ongoing palliative care follow up at home vs in clinic.    7/31: On approach patient was in room with Crissy ([phone]) and Tammy ([phone]), two of her three daughters (third daughter is Aster [phone], two sons Jose and Shamar are not local). Patient noted that primary team had told her that they were worried that she had cancer and that palliative care would come by to see her. We discussed the scope of palliative care as a service that can offer additional support and can get to know her to help ensure that the interventions that she is offered are in line with her goals and values. We discussed her understanding  of the concern for her cancer and the potential benefits and risks of the work-up stage at hand (c-scope). She noted that she had been afraid of the c-scope because she thought it was surgery which scared her. Her daughters noted that she has difficulty understanding nuanced subjects like this and would like to ensure that at least one daughter is present for any future significant discussions and whenever any significant information is imparted. The patient noted that she would like her daughters to be decision makers in cases in which she cannot make her own decisions. The daughters reported that they accepted this noting that while they had different views (one favors aggressive curative measures noting that she has a lot of geovanna in ability to heal, while another noted that she had seen a loved one go through metastatic cancer management and felt that depending on the situation a non-curative approach could provide value), but felt that they would be able to form a consensus and were willing to guide their mother's care. We discussed that the c-scope is not surgery, and started to delve into what ways the results of the scope may or may not . The patient and her daughters opted to try the c-scope to get more information at the outpatient follow up. They were interested in continued palliative involvement including potentially in that outpatient setting.      Plan:   -scope and biopsy today  -planning for outpt followup with onc and pal care (either in onc clinic or home pal care)  -encouraging pt and family to complete advanced care planning documents

## 2019-08-02 NOTE — SUBJECTIVE & OBJECTIVE
Interval History 8/2/2019:  Patient is seen for follow-up rehab evaluation and recommendations: S/p colonoscopy yesterday with bx pending. Patient wants HH therapy.     HPI, Past Medical, Family, and Social History remains the same as documented in the initial encounter.    Scheduled Medications:    amLODIPine  10 mg Oral Daily    atorvastatin  40 mg Oral Daily    losartan  50 mg Oral Daily    pantoprazole  40 mg Oral Daily       Diagnostic Results: Labs: Reviewed    PRN Medications: acetaminophen, Dextrose 10% Bolus, Dextrose 10% Bolus, glucagon (human recombinant), glucose, glucose, insulin aspart U-100, ondansetron, pneumoc 13-rayomnd conj-dip cr(PF), sodium chloride 0.9%    Review of Systems   Reason unable to perform ROS: limited 2/2 cognition.   Constitutional: Positive for activity change.   Respiratory: Negative for cough and shortness of breath.    Cardiovascular: Negative for chest pain and palpitations.   Musculoskeletal: Positive for gait problem.   Neurological: Positive for facial asymmetry and weakness.   Psychiatric/Behavioral: Positive for confusion.     Objective:     Vital Signs (Most Recent):  Temp: 97.8 °F (36.6 °C) (08/02/19 0816)  Pulse: 89 (08/02/19 0816)  Resp: 16 (08/02/19 0816)  BP: 136/78 (08/02/19 0816)  SpO2: 99 % (08/02/19 0816)    Vital Signs (24h Range):  Temp:  [97.4 °F (36.3 °C)-98.8 °F (37.1 °C)] 97.8 °F (36.6 °C)  Pulse:  [] 89  Resp:  [13-18] 16  SpO2:  [98 %-100 %] 99 %  BP: (121-143)/(57-78) 136/78     Physical Exam   Constitutional: She appears well-developed and well-nourished.   HENT:   Head: Normocephalic and atraumatic.   Eyes: Right eye exhibits no discharge. Left eye exhibits no discharge.   Neck: Neck supple.   Cardiovascular: Normal rate and intact distal pulses.   Pulmonary/Chest: Effort normal. No respiratory distress.   Abdominal: Soft. There is no tenderness.   Musculoskeletal: She exhibits no edema or deformity.   R sided weakness    Neurological: She is  alert. She is disoriented.   Follows commands   Facial asymmetry   + dysarthria    Skin: Skin is warm and dry.   Psychiatric: She has a normal mood and affect. Her behavior is normal. Cognition and memory are impaired.   Vitals reviewed.

## 2019-08-02 NOTE — ASSESSMENT & PLAN NOTE
-seen on MRI     See hospital course for functional, cognitive/speech/language, and nutrition/swallow status.      Recommendations  -  Encourage mobility, OOB in chair at least 3 hours per day, and early ambulation as appropriate   -  PT/OT evaluate and treat  -  SLP speech and cognitive evaluate and treat  -  Monitor sleep disturbances and establish consistent sleep-wake cycle  -  Monitor for bowel and bladder dysfunction  -  Monitor for shoulder pain, subluxation, & spasticity  -  Monitor for and prevent skin breakdown and pressure ulcers  · Early mobility, repositioning/weight shifting every 20-30 minutes when sitting, turn patient every 2 hours, proper mattress/overlay and chair cushioning, pressure relief/heel protector boots  -  Reviewed discharge options (IP rehab, SNF, HH therapy, and OP therapy)

## 2019-08-02 NOTE — PROGRESS NOTES
[facility] Hospital Medicine  Progress Note    Patient Name: Kayy Espinal  MRN: [id number]  Patient Class: IP- Inpatient   Admission Date: 7/28/2019  Length of Stay: 3 days  Attending Physician: Holly Montiel MD  Primary Care Provider: Enrique Dos Santos MD    [facility] Medicine Team: [facility] HOSP MED 2 Genevieve Patel MD    Subjective:     Principal Problem:Encephalopathy, metabolic      HPI:  Kayy Espinal is a 63 y.o. female with CVA (with residual R sided weakness), HTN, T2DM, GERD, cecal mass, who presents as transfer from [facility] ED for neurosurgery evaluation of petechial hemorrhage of R cerebellum. Patient appears confused about why she is in hospital; per chart review, patient presented to [facility] ED with family yesterday evening who stated that over the past 2 weeks patient has been more forgetful, with decreased appetite and significant weight loss, and does not seem to be speaking or verbalizing as she normally does. In addition, they noted frequent urination and diarrhea for 2-3 days, which reportedly resolved yesterday. She notes no further episodes today. Patient lives alone, but daughters (3) help with her care at times as they live in her neighborhood and check on her frequently. They reportedly deny any recent falls. Of note, patient was recently admitted 6/10/19-6/13/19 after she presented to ED requesting a medication refill and was found to be severely anemic (Hb 4), and received 3 u pRBCs. 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 Patient was offered inpatient colonoscopy (due to concern that patient might get lost to follow up) for further evaluation but patient deferred to outpatient. Patient denies abdominal pain. She does not appear  to have followed up.     On presentation to [facility] ED, CTH was obtained d/t concern for metastasis which was negative for mets and acute infarct, but did show small acute petechial hemorrhage of the right inferomedial cerebellum of uncertain etiology, with no associated vasogenic edema. Labs with WBC 11.6, Hb 10.2, Plt 506, INR 1.0, Na 136, K 3.1, lactate 1.9, TSH 2.6, UA with 13 WBCs, nitrite pos; patient was HDS and afebrile. CT a/p was then obtained, which was unchanged from prior. Decision was made to transfer patient to [facility] for neurosurgery evaluation.     Overview/Hospital Course:  Daughters at bedside feel pt's mentation is improved from admit. Started on Rocephin 1g for UTI. Ammonia wnl. Brain MRI with concern for metastasis. Rosephin discontinued due to improvement on repeat urine culture and no further clinical symtoms. GI performed a colonoscopy 8/1/19 with biopsy of cecal mass, cytology pending. Palliative care was consulted while inpatient for discussions regarding comfort measures/capacity to make decisions regarding pending diagnosis of cecal mas. Likely discharge tomorrow with close outpatient follow-up with oncology.     Interval History: Pt tolerated prep with some nausea/vomtiing. Colonoscopy with GI done today, pending results.     Review of Systems   Constitutional: Negative for chills, fatigue and fever.   Respiratory: Negative for cough, shortness of breath and wheezing.    Cardiovascular: Negative for chest pain and leg swelling.   Gastrointestinal: Positive for nausea and vomiting. Negative for abdominal pain.   Skin: Negative for rash and wound.   Neurological: Positive for facial asymmetry and speech difficulty. Negative for dizziness and headaches.     Objective:     Vital Signs (Most Recent):  Temp: 97.8 °F (36.6 °C) (08/01/19 1934)  Pulse: 80 (08/01/19 1934)  Resp: 14 (08/01/19 1934)  BP: 135/71 (08/01/19 1934)  SpO2: 99 % (08/01/19 1934) Vital Signs (24h Range):  Temp:  [97.4 °F (36.3  °C)-98.8 °F (37.1 °C)] 97.8 °F (36.6 °C)  Pulse:  [] 80  Resp:  [13-18] 14  SpO2:  [97 %-100 %] 99 %  BP: (108-143)/(57-75) 135/71     Weight: 61.8 kg (136 lb 3.9 oz)  Body mass index is 22.67 kg/m².    Intake/Output Summary (Last 24 hours) at 8/1/2019 2032  Last data filed at 8/1/2019 1900  Gross per 24 hour   Intake 4150 ml   Output 1750 ml   Net 2400 ml      Physical Exam   Constitutional: She is oriented to person, place, and time. No distress.   NAD   HENT:   Head: Normocephalic and atraumatic.   Mouth/Throat: Abnormal dentition.   Eyes: EOM are normal. No scleral icterus.   Neck: Normal range of motion. Neck supple.   Cardiovascular: Normal rate and regular rhythm.   No murmur heard.  Pulmonary/Chest: Effort normal and breath sounds normal. She has no wheezes.   Abdominal: Soft. She exhibits no distension. There is no tenderness.   Musculoskeletal: She exhibits no edema.   Residual right-sided weakness from CVA   Lymphadenopathy:     She has no cervical adenopathy.   Neurological: She is alert and oriented to person, place, and time.   Skin: Skin is warm and dry. She is not diaphoretic.   Psychiatric: She has a normal mood and affect.       Significant Labs:   CBC:   Recent Labs   Lab 07/31/19  0356 08/01/19  0409   WBC 12.09 13.36*   HGB 8.7* 8.7*   HCT 30.2* 28.4*   * 565*     CMP:   Recent Labs   Lab 07/31/19  0356 08/01/19  0409   * 132*   K 3.7 3.6   CL 95 92*   CO2 29 29   * 134*   BUN 11 12   CREATININE 0.8 0.8   CALCIUM 8.6* 9.0   PROT 6.3 6.7   ALBUMIN 1.9* 2.0*   BILITOT 0.8 0.8   ALKPHOS 134 139*   AST 39 44*   ALT 6* 7*   ANIONGAP 10 11   EGFRNONAA >60.0 >60.0       Significant Imaging: I have reviewed all pertinent imaging results/findings within the past 24 hours.      Assessment/Plan:      * Encephalopathy, metabolic    63 y.o. female with CVA (with residual R sided weakness), HTN, T2DM, GERD, cecal mass, who presents as transfer from [facility] [place] ED for neurosurgery  evaluation of petechial hemorrhage of R cerebellum. Per family has had forgetfullness, dec appetite and weight loss, urinary frequency and diarrhea, now resolved. Oriented only to self / place, disoriented to situation, no new FND (residual R sided weakness from prior CVA).     On admit:  - TSH wnl  - WBC 11.6, Hb 10.2, Plt 506, INR 1.0, Na 136, K 3.1, lactate 1.9  - UDS negative.  - UA with 13 WBCs, nitrite pos; denies symptoms but per family has had urinary freq and AMS  - patient was HDS and afebrile.       - MRI brain 7/29: Small enhancing lesion within the cerebellum on the right corresponding to abnormality on prior CT head, concern for metastatic lesion  -Given history of cecal mass, likely source, GI plans on doing c-scope Wednesday for evaluation   - Neurosurgery consulted  - Was on rocephin 1 g for UTI, discontinued on 7/30  -No further clinical symptoms,  repeat UA with improvement, blood cultures no growth to date  -Pt alert and oriented X3  -Palliative care consulted for decision making capacity, will involve daughters in all health care decisions     Cecal neoplasm  Recent diagnosis  (admitted 6/10/19-6/13/19) with anemia (Hb 4); EGD was unremarkable; CT a/p done which showed a bulky soft tissue mass seen in the cecum with irregular borders highly suspicious for cecal carcinoma along with enlarged liver containing numerous diffuse complex hypodensities highly suspicious for liver metastasis.     - MRI head 7/29 with concern for metastasis  - GI consulted, colonoscopy performed 8/1, biopsy taken of cecal mass, cytology pending, will likely need outpatient oncology follow-up as there is high likelihood of malignancy  -Discussed with daughters at bedside  -Palliative care consulted for comfort measure/capacity discussions    Hyponatremia  Na 132- Likely in the setting of colonoscopy Go-lytely prep and associated nausea/vomitting as well as NPO status    -Resume diet  -Urine studies ordered, will f/u   -Pt  alert and oriented (at her baseline since admission)        Impaired functional mobility and endurance  PT/OT evaluating , will d/c with home health orders for continued PT/OT      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 Has history of CVA with residual right-sided weakness.     -NS consulted  -MRI brain 7/29 with possible mets, low concern for repeat CVA, likely progression of cecal mass into brain  -Did not tolerate initial bowel prep, will try again with scheduled anti-emetics  -Colonoscopy 8/1 with GI, biopsy taken of cecal mass, cytology pending, will need close oncology follow-up after discharge       Essential hypertension  Resume home meds of amlodipine and losartan        VTE Risk Mitigation (From admission, onward)        Ordered     Place sequential compression device  Until discontinued      07/29/19 0217     IP VTE HIGH RISK PATIENT  Once      07/29/19 0217                Genevieve Patel MD  Department of Hospital Medicine   [facility]

## 2019-08-02 NOTE — PROGRESS NOTES
[facility]  Physical Medicine & Rehab  Progress Note    Patient Name: Kayy Espinal  MRN: [id number]  Admission Date: 7/28/2019  Length of Stay: 4 days  Attending Physician: Holly Montiel MD    Subjective:     Principal Problem:Encephalopathy, metabolic    Hospital Course:   07/29/2019: OT-Bed mobility SBA-CGA .  Sit to stand and transfers Migue. Feeding setupA. Toileting SBA. Passed bedside swallow evaluation.  SLP recommending regular diet and thin liquids.  7/30/19: 07/30/2019: Bed mobility CGA-Migue.  Sit to stand and transfers Migue.  Ambulated 10 ft ModA Unsteadiness, shuffled gait, L lateral lean, posterior lean, c/o fatigue. UBD Migue.  7/31/19: SLP diagnosis of Dysarthria and Cognitive-Linguistic Impairment.   08/02/2019: Bed mobility CGA-Migue .  Sit to stand Migue-ModA.  Ambulated 10 ft x 2 ModA.  SLP diagnosis of Cognitive-Linguistic Impairment    Interval History 8/2/2019:  Patient is seen for follow-up rehab evaluation and recommendations: S/p colonoscopy yesterday with bx pending. Patient wants HH therapy.     HPI, Past Medical, Family, and Social History remains the same as documented in the initial encounter.    Scheduled Medications:    amLODIPine  10 mg Oral Daily    atorvastatin  40 mg Oral Daily    losartan  50 mg Oral Daily    pantoprazole  40 mg Oral Daily       Diagnostic Results: Labs: Reviewed    PRN Medications: acetaminophen, Dextrose 10% Bolus, Dextrose 10% Bolus, glucagon (human recombinant), glucose, glucose, insulin aspart U-100, ondansetron, pneumoc 13-raymond conj-dip cr(PF), sodium chloride 0.9%    Review of Systems   Reason unable to perform ROS: limited 2/2 cognition.   Constitutional: Positive for activity change.   Respiratory: Negative for cough and shortness of breath.    Cardiovascular: Negative for chest pain and palpitations.   Musculoskeletal: Positive for gait problem.   Neurological: Positive for facial asymmetry and weakness.   Psychiatric/Behavioral:  Positive for confusion.     Objective:     Vital Signs (Most Recent):  Temp: 97.8 °F (36.6 °C) (08/02/19 0816)  Pulse: 89 (08/02/19 0816)  Resp: 16 (08/02/19 0816)  BP: 136/78 (08/02/19 0816)  SpO2: 99 % (08/02/19 0816)    Vital Signs (24h Range):  Temp:  [97.4 °F (36.3 °C)-98.8 °F (37.1 °C)] 97.8 °F (36.6 °C)  Pulse:  [] 89  Resp:  [13-18] 16  SpO2:  [98 %-100 %] 99 %  BP: (121-143)/(57-78) 136/78     Physical Exam   Constitutional: She appears well-developed and well-nourished.   HENT:   Head: Normocephalic and atraumatic.   Eyes: Right eye exhibits no discharge. Left eye exhibits no discharge.   Neck: Neck supple.   Cardiovascular: Normal rate and intact distal pulses.   Pulmonary/Chest: Effort normal. No respiratory distress.   Abdominal: Soft. There is no tenderness.   Musculoskeletal: She exhibits no edema or deformity.   R sided weakness    Neurological: She is alert. She is disoriented.   Follows commands   Facial asymmetry   + dysarthria    Skin: Skin is warm and dry.   Psychiatric: She has a normal mood and affect. Her behavior is normal. Cognition and memory are impaired.   Vitals reviewed.    Assessment/Plan:      * Encephalopathy, metabolic  -MRI brain revealed small enhancing lesion within the cerebellum on the right corresponding to abnormality on prior CT head, concern for metastatic lesion    See hospital course for functional, cognitive/speech/language, and nutrition/swallow status.      Recommendations  -  Encourage mobility, OOB in chair at least 3 hours per day, and early ambulation as appropriate   -  PT/OT evaluate and treat  -  SLP speech and cognitive evaluate and treat  -  Monitor sleep disturbances and establish consistent sleep-wake cycle  -  Monitor for bowel and bladder dysfunction  -  Monitor for shoulder pain, subluxation, & spasticity  -  Monitor for and prevent skin breakdown and pressure ulcers  · Early mobility, repositioning/weight shifting every 20-30 minutes when sitting,  turn patient every 2 hours, proper mattress/overlay and chair cushioning, pressure relief/heel protector boots  -  Reviewed discharge options (IP rehab, SNF, HH therapy, and OP therapy)    Impaired functional mobility and endurance  Recommendations  -  Encourage mobility, OOB in chair at least 3 hours per day, and early ambulation as appropriate  -  PT/OT evaluate and treat  -  Pain management  -  Monitor for and prevent skin breakdown and pressure ulcers  · Early mobility, repositioning/weight shifting every 20-30 minutes when sitting, turn patient every 2 hours, proper mattress/overlay and chair cushioning, pressure relief/heel protector boots  -  DVT prophylaxis    -  Reviewed discharge options (IP rehab, SNF, HH therapy, and OP therapy)    Cecal neoplasm  -GI consulted  -s/p c-scope with bx pending   -otp Oncology f/u recommended     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      Recommendations  -  Encourage mobility, OOB in chair at least 3 hours per day, and early ambulation as appropriate   -  PT/OT evaluate and treat  -  SLP speech and cognitive evaluate and treat  -  Monitor sleep disturbances and establish consistent sleep-wake cycle  -  Monitor for bowel and bladder dysfunction  -  Monitor for shoulder pain, subluxation, & spasticity  -  Monitor for and prevent skin breakdown and pressure ulcers  · Early mobility, repositioning/weight shifting every 20-30 minutes when sitting, turn patient every 2 hours, proper mattress/overlay and chair cushioning, pressure relief/heel protector boots  -  Reviewed discharge options (IP rehab, SNF, HH therapy, and OP therapy)      Recommend Inpatient Rehab; however, patients wants  SLP/OT/PT.       Carolyne Hollingsworth NP  Department of Physical Medicine & Rehab   [facility]

## 2019-08-02 NOTE — ASSESSMENT & PLAN NOTE
Noted on CT at [facility] ED-transferred to [facility] for neurosurgery eval. Has history of CVA with residual right-sided weakness. Likely mets from cecal mass. Referred to oncology for follow-up after d/c

## 2019-08-02 NOTE — PLAN OF CARE
Problem: Adult Inpatient Plan of Care  Goal: Plan of Care Review  Plan of care reviewed with patient and daughter at beginning of shift. Pt was attempting to take the go lytely for her colonoscopy. Pt continuously was throwing up and unable to keep most of it down. Endoscopy decided to take her anyway. Pt now has a a L Hand 20g. Accuchecks. ACHS. Patient is still pending a urine sodium and osmolarity test. R side weakness. R upper arm has no active ROM. Q1H  VSS Afebrile Bed locked in lowest position. Side rails up x2. Call bell within reach. BADL within reach. Rounding Q1H. Will continue to monitor.

## 2019-08-02 NOTE — ASSESSMENT & PLAN NOTE
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     - MRI head 7/29 with concern for metastasis  - GI consulted, colonoscopy performed 8/1, biopsy taken of cecal mass, cytology pending, referred to oncology for follow-up as there is high likelihood of malignancy and for results of biopsy.  -Discussed with daughters at bedside  -Palliative care consulted for comfort measure/capacity discussions, daughters to be involved in all further medical decisions

## 2019-08-02 NOTE — SUBJECTIVE & OBJECTIVE
Interval History: Pt tolerated prep with some nausea/vomtiing. Colonoscopy with GI done today, pending results.     Review of Systems   Constitutional: Negative for chills, fatigue and fever.   Respiratory: Negative for cough, shortness of breath and wheezing.    Cardiovascular: Negative for chest pain and leg swelling.   Gastrointestinal: Positive for nausea and vomiting. Negative for abdominal pain.   Skin: Negative for rash and wound.   Neurological: Positive for facial asymmetry and speech difficulty. Negative for dizziness and headaches.     Objective:     Vital Signs (Most Recent):  Temp: 97.8 °F (36.6 °C) (08/01/19 1934)  Pulse: 80 (08/01/19 1934)  Resp: 14 (08/01/19 1934)  BP: 135/71 (08/01/19 1934)  SpO2: 99 % (08/01/19 1934) Vital Signs (24h Range):  Temp:  [97.4 °F (36.3 °C)-98.8 °F (37.1 °C)] 97.8 °F (36.6 °C)  Pulse:  [] 80  Resp:  [13-18] 14  SpO2:  [97 %-100 %] 99 %  BP: (108-143)/(57-75) 135/71     Weight: 61.8 kg (136 lb 3.9 oz)  Body mass index is 22.67 kg/m².    Intake/Output Summary (Last 24 hours) at 8/1/2019 2032  Last data filed at 8/1/2019 1900  Gross per 24 hour   Intake 4150 ml   Output 1750 ml   Net 2400 ml      Physical Exam   Constitutional: She is oriented to person, place, and time. No distress.   NAD   HENT:   Head: Normocephalic and atraumatic.   Mouth/Throat: Abnormal dentition.   Eyes: EOM are normal. No scleral icterus.   Neck: Normal range of motion. Neck supple.   Cardiovascular: Normal rate and regular rhythm.   No murmur heard.  Pulmonary/Chest: Effort normal and breath sounds normal. She has no wheezes.   Abdominal: Soft. She exhibits no distension. There is no tenderness.   Musculoskeletal: She exhibits no edema.   Residual right-sided weakness from CVA   Lymphadenopathy:     She has no cervical adenopathy.   Neurological: She is alert and oriented to person, place, and time.   Skin: Skin is warm and dry. She is not diaphoretic.   Psychiatric: She has a normal mood and  affect.       Significant Labs:   CBC:   Recent Labs   Lab 07/31/19 0356 08/01/19  0409   WBC 12.09 13.36*   HGB 8.7* 8.7*   HCT 30.2* 28.4*   * 565*     CMP:   Recent Labs   Lab 07/31/19 0356 08/01/19  0409   * 132*   K 3.7 3.6   CL 95 92*   CO2 29 29   * 134*   BUN 11 12   CREATININE 0.8 0.8   CALCIUM 8.6* 9.0   PROT 6.3 6.7   ALBUMIN 1.9* 2.0*   BILITOT 0.8 0.8   ALKPHOS 134 139*   AST 39 44*   ALT 6* 7*   ANIONGAP 10 11   EGFRNONAA >60.0 >60.0       Significant Imaging: I have reviewed all pertinent imaging results/findings within the past 24 hours.

## 2019-08-02 NOTE — PROGRESS NOTES
Pt seen briefly with her sister at bedside .  Tolerated scope well and planning for d/c to home with oncology follow up.  She is also open to outpt pal care follow up once path returns.      Will schedule follow up as an outpt.    Papito Glass MD  Palliative Medicine  [phone]      Please call with questions

## 2019-08-02 NOTE — PLAN OF CARE
Problem: Adult Inpatient Plan of Care  Goal: Plan of Care Review  Pt AAOX4. Patient now on clear liquid diet but has not had an appetite. 1 BM - loose/liquid and brown. Using bedside commode, up with assistance of family. Vital signs stable, patient afebrile. No complaints of pain or nausea. Will continue to monitor.

## 2019-08-02 NOTE — ASSESSMENT & PLAN NOTE
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     - MRI head 7/29 with concern for metastasis  - GI consulted, colonoscopy performed 8/1, biopsy taken of cecal mass, cytology pending, will likely need outpatient oncology follow-up as there is high likelihood of malignancy  -Discussed with daughters at bedside  -Palliative care consulted for comfort measure/capacity discussions

## 2019-08-02 NOTE — DISCHARGE SUMMARY
[facility] Hospital Medicine  Discharge Summary      Patient Name: Kayy Espinal  MRN: [id number]  Admission Date: 7/28/2019  Hospital Length of Stay: 4 days  Discharge Date and Time:  08/02/2019 5:48 PM  Attending Physician: No att. providers found   Discharging Provider: Genevieve Patel MD  Primary Care Provider: Enrique Dos Santos MD  [facility] Medicine Team: [facility] HOSP MED 2 Genevieve Patel MD    HPI:   Kayy Espinal is a 63 y.o. female with CVA (with residual R sided weakness), HTN, T2DM, GERD, cecal mass, who presents as transfer from [facility] ED for neurosurgery evaluation of petechial hemorrhage of R cerebellum. Patient appears confused about why she is in hospital; per chart review, patient presented to [facility] ED with family yesterday evening who stated that over the past 2 weeks patient has been more forgetful, with decreased appetite and significant weight loss, and does not seem to be speaking or verbalizing as she normally does. In addition, they noted frequent urination and diarrhea for 2-3 days, which reportedly resolved yesterday. She notes no further episodes today. Patient lives alone, but daughters (3) help with her care at times as they live in her neighborhood and check on her frequently. They reportedly deny any recent falls. Of note, patient was recently admitted 6/10/19-6/13/19 after she presented to ED requesting a medication refill and was found to be severely anemic (Hb 4), and received 3 u pRBCs. 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 Patient was offered inpatient colonoscopy (due to concern that patient might get lost to follow up) for further evaluation but patient deferred to outpatient. Patient denies abdominal pain. She does not  appear to have followed up.     On presentation to [facility] ED, CTH was obtained d/t concern for metastasis which was negative for mets and acute infarct, but did show small acute petechial hemorrhage of the right inferomedial cerebellum of uncertain etiology, with no associated vasogenic edema. Labs with WBC 11.6, Hb 10.2, Plt 506, INR 1.0, Na 136, K 3.1, lactate 1.9, TSH 2.6, UA with 13 WBCs, nitrite pos; patient was HDS and afebrile. CT a/p was then obtained, which was unchanged from prior. Decision was made to transfer patient to [facility] for neurosurgery evaluation.     Procedure(s) (LRB):  COLONOSCOPY (N/A)      Hospital Course:   Ms. Espinal is a 63yF who was brought into the ED due to altered mental status. She was started on Rocephin 1g for UTI. Ammonia wnl. Brain MRI with concern for metastasis from cecal mass seen on abdominal imaging at outside hospital. Rosephin was discontinued after improvement on repeat urine culture and no further clinical symtoms. GI performed a colonoscopy 8/1/19 with biopsy of cecal mass, cytology pending. Palliative care was consulted while inpatient for discussions regarding comfort measures/capacity to make decisions regarding pending diagnosis of cecal mas. On day of discharge, pt was tolerating PO with normalization of labs and was cognitively back to baseline per daughters at bedside. She will be discharged with home health orders for aid, PT and OT. She is advised to follow-up with oncology after discharge for results of the cecal mass biopsy and possible treatment plans.       General-well-appearing  HEENT-residual right-sided facial droop  CV-regular rate and rhythm  Resp-CTAB  Abd-soft, NT, ND  MSK-no LE edema      Consults:   Consults (From admission, onward)        Status Ordering Provider     Inpatient consult to Gastroenterology  Once     Provider:  (Not yet assigned)    Completed LYNDSAY MCGOWAN     Inpatient consult to Palliative Care  Once     Provider:  (Not  yet assigned)    Completed RUBIN BURGOS     Inpatient consult to Physical Medicine Rehab  Once     Provider:  (Not yet assigned)    Completed NICOLA CEDENO M          * Encephalopathy, metabolic    63 y.o. female with CVA (with residual R sided weakness), HTN, T2DM, GERD, cecal mass, who presents as transfer from [facility] ED for neurosurgery evaluation of petechial hemorrhage of R cerebellum. Per family has had forgetfullness, dec appetite and weight loss, urinary frequency and diarrhea, now resolved. Oriented only to self / place, disoriented to situation, no new FND (residual R sided weakness from prior CVA).     On admit:  - TSH wnl  - WBC 11.6, Hb 10.2, Plt 506, INR 1.0, Na 136, K 3.1, lactate 1.9  - UDS negative.  - UA with 13 WBCs, nitrite pos; denies symptoms but per family has had urinary freq and AMS  - patient was HDS and afebrile.       - MRI brain 7/29: Small enhancing lesion within the cerebellum on the right corresponding to abnormality on prior CT head, concern for metastatic lesion  -Given history of cecal mass, likely source, GI plans on doing c-scope Wednesday for evaluation   - Neurosurgery consulted  - Was on rocephin 1 g for UTI, discontinued on 7/30  -No further clinical symptoms,  repeat UA with improvement, blood cultures no growth to date  -Pt alert and oriented X3  -Palliative care consulted for decision making capacity, will involve daughters in all health care decisions   -Resolved on d/c    Cecal neoplasm  Recent diagnosis  (admitted 6/10/19-6/13/19) with anemia (Hb 4); EGD was unremarkable; CT a/p done which showed a bulky soft tissue mass seen in the cecum with irregular borders highly suspicious for cecal carcinoma along with enlarged liver containing numerous diffuse complex hypodensities highly suspicious for liver metastasis.     - MRI head 7/29 with concern for metastasis  - GI consulted, colonoscopy performed 8/1, biopsy taken of cecal mass, cytology pending, referred to oncology  for follow-up as there is high likelihood of malignancy and for results of biopsy.  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 Has history of CVA with residual right-sided weakness. Likely mets from cecal mass. 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    Specialty:  Internal Medicine  Why:  In next 1-2 weeks  Contact information:  [address] [facility] [address]  [phone]             [facility] HOSPICE & PALLIATIVE MEDICINE In  "2 weeks.    Specialty:  Hospice and Palliative Medicine  Why:  You will be called regarding a follow up appointment.  We will try to coordinate with your oncology appointment  Contact information:  Law[address]  [facility] [id number] [phone]               Patient Instructions:      HOSPITAL BED FOR HOME USE     Order Specific Question Answer Comments   Type: Semi-electric    Length of need (1-99 months): 99    Does patient have medical equipment at home? wheelchair    Does patient have medical equipment at home? walker, rolling    Does patient have medical equipment at home? shower chair    Does patient have medical equipment at home? cane, quad    Does patient have medical equipment at home? cane, straight    Does patient have medical equipment at home? none    Height: 5' 5" (1.651 m)    Weight: 61.8 kg (136 lb 3.9 oz)    Please check all that apply: Patient requires, for the alleviation of pain, positioning of the body in ways not feasible in an ordinary bed.      WHEELCHAIR FOR HOME USE     Order Specific Question Answer Comments   Hours in W/C per day: 10    Type of Wheelchair: Standard    Size(Width): 18"(STD adult)    Leg Support: Swing Away    Lap Belt: Velcro    Cushion: Foam    Justification for cushion: Prevent pressure ulcers    Height: 5' 5" (1.651 m)    Weight: 61.8 kg (136 lb 3.9 oz)    Does patient have medical equipment at home? wheelchair    Does patient have medical equipment at home? walker, rolling    Does patient have medical equipment at home? shower chair    Does patient have medical equipment at home? cane, quad    Does patient have medical equipment at home? cane, straight    Does patient have medical equipment at home? none    Length of need (1-99 months): 99    Please check all that apply: Patient mobility limitations cannot be sufficiently resolved by the use of other ambulatory therapies.      HOSPITAL BED FOR HOME USE     Order Specific Question Answer Comments   Type: " "Semi-electric    Length of need (1-99 months): 99    Does patient have medical equipment at home? wheelchair    Does patient have medical equipment at home? walker, rolling    Does patient have medical equipment at home? shower chair    Does patient have medical equipment at home? cane, quad    Does patient have medical equipment at home? cane straight    Height: 5' 5" (1.651 m)    Weight: 61.8 kg (136 lb 3.9 oz)    Please check all that apply: Patient requires positioning of the body in ways not feasible in an ordinary bed due to a medical condition which is expected to last at least one month.      WHEELCHAIR FOR HOME USE     Order Specific Question Answer Comments   Hours in W/C per day: 6    Type of Wheelchair: Standard    Size(Width): 16"(small adult)    Leg Support: STD footrests    Lap Belt: Velcro    Cushion: Basic    Height: 5' 5" (1.651 m)    Weight: 61.8 kg (136 lb 3.9 oz)    Does patient have medical equipment at home? wheelchair    Does patient have medical equipment at home? walker, rolling    Does patient have medical equipment at home? shower chair    Does patient have medical equipment at home? cane quad    Does patient have medical equipment at home? canegina    Length of need (1-99 months): 99    Please check all that apply: Caregiver is capable and willing to operate wheelchair safely.      Ambulatory Referral to Hematology / Oncology   Referral Priority: Routine Referral Type: Consultation   Referral Reason: Specialty Services Required   Requested Specialty: Hematology and Oncology   Number of Visits Requested: 1     Diet Adult Regular     Notify your health care provider if you experience any of the following:  persistent nausea and vomiting or diarrhea     Notify your health care provider if you experience any of the following:  severe uncontrolled pain     Notify your health care provider if you experience any of the following:  increased confusion or weakness     Notify your health " care provider if you experience any of the following:  temperature >100.4     Activity as tolerated       Significant Diagnostic Studies: Labs:   BMP:   Recent Labs   Lab 08/01/19  0409 08/02/19  0447   * 81   * 138   K 3.6 3.3*   CL 92* 96   CO2 29 30*   BUN 12 11   CREATININE 0.8 0.8   CALCIUM 9.0 9.1   MG 1.8 1.9    and CBC   Recent Labs   Lab 08/01/19  0409 08/02/19  0447   WBC 13.36* 11.82   HGB 8.7* 9.1*   HCT 28.4* 30.4*   * 470*       Pending Diagnostic Studies:     None         Medications:  Reconciled Home Medications:      Medication List      START taking these medications    amLODIPine 10 MG tablet  Commonly known as:  NORVASC  Take 1 tablet (10 mg total) by mouth once daily.     losartan 50 MG tablet  Commonly known as:  COZAAR  Take 1 tablet (50 mg total) by mouth once daily.        CHANGE how you take these medications    atorvastatin 40 MG tablet  Commonly known as:  LIPITOR  Take 1 tablet (40 mg total) by mouth once daily.  What changed:  Another medication with the same name was removed. Continue taking this medication, and follow the directions you see here.        CONTINUE taking these medications    esomeprazole 20 MG capsule  Commonly known as:  NEXIUM  Take 1 capsule (20 mg total) by mouth before breakfast.            Indwelling Lines/Drains at time of discharge:   Lines/Drains/Airways          None          Time spent on the discharge of patient: 35 minutes  Patient was seen and examined on the date of discharge and determined to be suitable for discharge.         Genevieve Patel MD   PGY-1  Department of Hospital Medicine  [facility]

## 2019-08-02 NOTE — ASSESSMENT & PLAN NOTE
63 y.o. female with CVA (with residual R sided weakness), HTN, T2DM, GERD, cecal mass, who presents as transfer from [facility] ED for neurosurgery evaluation of petechial hemorrhage of R cerebellum. Per family has had forgetfullness, dec appetite and weight loss, urinary frequency and diarrhea, now resolved. Oriented only to self / place, disoriented to situation, no new FND (residual R sided weakness from prior CVA).     On admit:  - TSH wnl  - WBC 11.6, Hb 10.2, Plt 506, INR 1.0, Na 136, K 3.1, lactate 1.9  - UDS negative.  - UA with 13 WBCs, nitrite pos; denies symptoms but per family has had urinary freq and AMS  - patient was HDS and afebrile.       - MRI brain 7/29: Small enhancing lesion within the cerebellum on the right corresponding to abnormality on prior CT head, concern for metastatic lesion  -Given history of cecal mass, likely source, GI plans on doing c-scope Wednesday for evaluation   - Neurosurgery consulted  - Was on rocephin 1 g for UTI, discontinued on 7/30  -No further clinical symptoms,  repeat UA with improvement, blood cultures no growth to date  -Pt alert and oriented X3  -Palliative care consulted for decision making capacity, will involve daughters in all health care decisions   -Resolved on d/c

## 2019-08-02 NOTE — ASSESSMENT & PLAN NOTE
Na 132- likely in the setting of colonoscopy Go-lytely prep and associated nausea/vomitting as well as NPO status    -Improved to 138  -Resolved on d/c (likely due to colonoscopy prep and NPO status)

## 2019-08-02 NOTE — PLAN OF CARE
SW contacted pt's Medicaid insurance to arrange wheelchair transportation. After multiple calls. SW was informed that pt can not be located in the system.    Transportation arranged for wheelchair via [facility] (x. [phone]). Requested pickup time is  12:30 PM. Requested pickup time is not a guarantee of time of arrival.     RN aware.     1:10 PM  JANIE informed by RN that transportation has not arrived yet and the family was able to obtain a car to transport patient home. JANIE contacted [facility] to cancel transportation.     Carmelita Cid LMSW  [facility]

## 2019-08-02 NOTE — ANESTHESIA POSTPROCEDURE EVALUATION
Anesthesia Post Evaluation    Patient: Kayy Espinal    Procedure(s) Performed: Procedure(s) (LRB):  COLONOSCOPY (N/A)    Final Anesthesia Type: general  Patient location during evaluation: PACU  Patient participation: Yes- Able to Participate  Level of consciousness: awake and alert  Post-procedure vital signs: reviewed and stable  Pain management: adequate  Airway patency: patent  PONV status at discharge: No PONV  Anesthetic complications: no      Cardiovascular status: blood pressure returned to baseline and stable  Respiratory status: unassisted  Hydration status: euvolemic  Follow-up not needed.          Vitals Value Taken Time   /78 8/2/2019  8:16 AM   Temp 36.6 °C (97.8 °F) 8/2/2019  8:16 AM   Pulse 89 8/2/2019  8:16 AM   Resp 16 8/2/2019  8:16 AM   SpO2 99 % 8/2/2019  8:16 AM         No case tracking events are documented in the log.      Pain/Shashank Score: Shashank Score: 9 (8/1/2019  4:34 PM)

## 2019-08-02 NOTE — PROGRESS NOTES
[facility]  Palliative Medicine  Progress Note    Patient Name: Kayy Espinal  MRN: [id number]  Admission Date: 7/28/2019  Hospital Length of Stay: 4 days  Code Status: Full Code   Attending Provider: Holly Montiel MD  Consulting Provider: Papito Glass MD  Primary Care Physician: Enrique Dos Santos MD  Principal Problem:Encephalopathy, metabolic    Patient information was obtained from patient, relative(s) and past medical records.      Assessment/Plan:     Palliative care encounter  Kayy Espinal is a 63 y.o. female with a past medical history significant for CVA with residual R sided weakness and admission 6/10/19-6/13/19 for anemia work up which found a mass highly suspicious for cecal carcinoma along with enlarged liver containing numerous diffuse complex hypodensities highly suspicious for liver metastasis. She presented to outside ED 2019/7/28 with family reporting progressive confusion and weight loss for the preceeding 2 weeks, was found to have acute petechial hemorrhage of the right inferomedial cerebellum and was transferred to [facility] for neurosurgery evaluation, concerning for metastases. As cognitive functioning improved the patient voiced that she did not wish to continue work up, primary team noted that she declined colonoscopy for biopsy, understands risks of not undergoing biopsy and getting treatment, and was interested in talking to palliative care about comfort measures at home, so Palliative care team was consulted to discuss Goals of Care.    8/1 Followed up with patient and daughters who stayed the night. Planning to proceed with colonoscopy with plans today of obtaining more information to help make decisions as an outpt.  Pal care LCSW also present and also spent time with family.  Pt is looking forward to going home soon. They would be open to ongoing palliative care follow up at home vs in clinic.    7/31: On approach patient was in room with Crissy  ([id number]) and Tammy ([id number]), two of her three daughters (third daughter is Aster [phone], two sons Jose and Shamar are not local). Patient noted that primary team had told her that they were worried that she had cancer and that palliative care would come by to see her. We discussed the scope of palliative care as a service that can offer additional support and can get to know her to help ensure that the interventions that she is offered are in line with her goals and values. We discussed her understanding of the concern for her cancer and the potential benefits and risks of the work-up stage at hand (c-scope). She noted that she had been afraid of the c-scope because she thought it was surgery which scared her. Her daughters noted that she has difficulty understanding nuanced subjects like this and would like to ensure that at least one daughter is present for any future significant discussions and whenever any significant information is imparted. The patient noted that she would like her daughters to be decision makers in cases in which she cannot make her own decisions. The daughters reported that they accepted this noting that while they had different views (one favors aggressive curative measures noting that she has a lot of geovanna in ability to heal, while another noted that she had seen a loved one go through metastatic cancer management and felt that depending on the situation a non-curative approach could provide value), but felt that they would be able to form a consensus and were willing to guide their mother's care. We discussed that the c-scope is not surgery, and started to delve into what ways the results of the scope may or may not . The patient and her daughters opted to try the c-scope to get more information at the outpatient follow up. They were interested in continued palliative involvement including potentially in that outpatient setting.      Plan:   -scope and  biopsy today  -planning for outpt followup with onc and pal care (either in onc clinic or home pal care)  -encouraging pt and family to complete advanced care planning documents        I will follow-up with patient. Please contact us if you have any additional questions.    Subjective:     Chief Complaint:   Chief Complaint   Patient presents with    Polyuria     and AMS yesterday, pt has no complaint today, family states major wt loss since discharged from hospital 3 wks ago       HPI:   Kayy Espinal is a 63 y.o. female with a past medical history significant for CVA with residual R sided weakness and admission 6/10/19-6/13/19 for anemia work up which found a mass highly suspicious for cecal carcinoma along with enlarged liver containing numerous diffuse complex hypodensities highly suspicious for liver metastasis. She presented to outside ED 2019/7/28 with family reporting progressive confusion and weight loss for the preceeding 2 weeks, was found to have acute petechial hemorrhage of the right inferomedial cerebellum and was transferred to [facility] for neurosurgery evaluation, concerning for metastases. As cognitive functioning improved the patient voiced that she did not wish to continue work up, primary team noted that she declined colonoscopy for biopsy, understands risks of not undergoing biopsy and getting treatment, and was interested in talking to palliative care about comfort measures at home, so Palliative care team was consulted to discuss Goals of Care.        Hospital Course:  No notes on file    Interval History: Pt seen at bedside with two dtrs present.  Tolerated clear liquid dinner last night without difficulty or emesis, however not tolerating golytely well with multiple episodes of emesis.  Six documented small BM.  Denies abd pain, + flautus    Past Medical History:   Diagnosis Date    Asthma     Chronic anemia     Diabetes mellitus     Hypertension     Stroke        Past Surgical  History:   Procedure Laterality Date    ESOPHAGOGASTRODUODENOSCOPY (EGD) N/A 6/12/2019    Performed by Wilber Duarte MD at [facility] ENDO       Review of patient's allergies indicates:  No Known Allergies    Medications:  Continuous Infusions:  Scheduled Meds:   amLODIPine  10 mg Oral Daily    atorvastatin  40 mg Oral Daily    losartan  50 mg Oral Daily    pantoprazole  40 mg Oral Daily    polyethylene glycol  4,000 mL Oral Once     PRN Meds:acetaminophen, Dextrose 10% Bolus, Dextrose 10% Bolus, glucagon (human recombinant), glucose, glucose, insulin aspart U-100, ondansetron, pneumoc 13-raymond conj-dip cr(PF), sodium chloride 0.9%    Family History     Problem Relation (Age of Onset)    Diabetes Mother    Hypertension Mother        Tobacco Use    Smoking status: Never Smoker    Smokeless tobacco: Never Used   Substance and Sexual Activity    Alcohol use: Not Currently    Drug use: Not Currently    Sexual activity: Not Currently       Review of Systems   Gastrointestinal: Negative for abdominal distention, abdominal pain, constipation, diarrhea and nausea.     Objective:     Vital Signs (Most Recent):  Temp: 97.6 °F (36.4 °C) (08/01/19 1451)  Pulse: 102 (08/01/19 1451)  Resp: 17 (08/01/19 1451)  BP: (!) 140/70 (08/01/19 1451)  SpO2: 100 % (08/01/19 1451) Vital Signs (24h Range):  Temp:  [97.4 °F (36.3 °C)-98.2 °F (36.8 °C)] 97.6 °F (36.4 °C)  Pulse:  [] 102  Resp:  [14-18] 17  SpO2:  [95 %-100 %] 100 %  BP: (108-140)/(60-75) 140/70     Weight: 61.8 kg (136 lb 3.9 oz)  Body mass index is 22.67 kg/m².    Review of Symptoms  Symptom Assessment (ESAS 0-10 scale)   ESAS 0 1 2 3 4 5 6 7 8 9 10   Pain              Dyspnea              Anxiety              Nausea              Depression               Anorexia              Fatigue              Insomnia              Restlessness               Agitation              CAM / Delirium __ --  ___+   Constipation     __ --  ___+   Diarrhea           __ --  ___+  Bowel  Management Plan (BMP): Yes    Comments:     Pain Assessment: denied    OME in 24 hours: 0    Performance Status: 90    ECOG Performance Status Grade: 1 - Ambulates, capable of light work    Physical Exam   Constitutional: No distress.   Temporal wasting   HENT:   Head: Normocephalic and atraumatic.   Eyes: EOM are normal.   Neck: No JVD present.   Cardiovascular: Normal rate, regular rhythm and normal heart sounds.   Pulmonary/Chest: Effort normal and breath sounds normal. No respiratory distress. She has no wheezes. She has no rales.   Abdominal: Soft. She exhibits no distension. There is no tenderness. There is no guarding.   Hypoactive bowel sounds   Neurological: She is alert.   R hemiplegia   Skin: Skin is warm and dry.   Psychiatric: Judgment normal.   Nursing note and vitals reviewed.      Significant Labs: All pertinent labs within the past 24 hours have been reviewed.  CBC:   Recent Labs   Lab 08/01/19  0409   WBC 13.36*   HGB 8.7*   HCT 28.4*   MCV 72*   *     BMP:  Recent Labs   Lab 08/01/19  0409   *   *   K 3.6   CL 92*   CO2 29   BUN 12   CREATININE 0.8   CALCIUM 9.0   MG 1.8     LFT:  Lab Results   Component Value Date    AST 44 (H) 08/01/2019    ALKPHOS 139 (H) 08/01/2019    BILITOT 0.8 08/01/2019     Albumin:   Albumin   Date Value Ref Range Status   08/01/2019 2.0 (L) 3.5 - 5.2 g/dL Final     Protein:   Total Protein   Date Value Ref Range Status   08/01/2019 6.7 6.0 - 8.4 g/dL Final     Lactic acid:   Lab Results   Component Value Date    LACTATE 2.4 (H) 07/29/2019    LACTATE 1.9 07/28/2019       Significant Imaging: I have reviewed all pertinent imaging results/findings within the past 24 hours.    Advance Care Planning   Advanced Directives::  Living Will: No  LaPOST: No  Do Not Resuscitate Status: No  Medical Power of : No, but stated that she would like her daughters to make decisions for her if she cannot, and is not     Decision-Making Capacity: Patient  answered questions, Family answered questions       Living Arrangements: Lives alone, daughter lives next door helps with IADLs eg medication management, bill paying    Psychosocial/Cultural:  Patient's most important priorities:  She is an independent person who likes her own space, also values the support of her children    Patient's biggest concerns/fears:  TBD, has noted loss of independence, pain as concerns    Previous death/end of life care history:  Her father's mother had cancer    Patient's goals/hopes:  TBD, involved continued independence    Spiritual:     F- Shanti and Belief: yes    I - Importance: important  .  C - Community: going to [employer] is important    A - Address in Care: TBD      > 50% of 26 min visit spent in chart review, face to face discussion of goals of care,  symptom assessment, coordination of care and emotional support.    Papito Glass MD  Palliative Medicine  [facility]

## 2019-08-02 NOTE — ASSESSMENT & PLAN NOTE
63 y.o. female with CVA (with residual R sided weakness), HTN, T2DM, GERD, cecal mass, who presents as transfer from [facility] ED for neurosurgery evaluation of petechial hemorrhage of R cerebellum. Per family has had forgetfullness, dec appetite and weight loss, urinary frequency and diarrhea, now resolved. Oriented only to self / place, disoriented to situation, no new FND (residual R sided weakness from prior CVA).     On admit:  - TSH wnl  - WBC 11.6, Hb 10.2, Plt 506, INR 1.0, Na 136, K 3.1, lactate 1.9  - UDS negative.  - UA with 13 WBCs, nitrite pos; denies symptoms but per family has had urinary freq and AMS  - patient was HDS and afebrile.       - MRI brain 7/29: Small enhancing lesion within the cerebellum on the right corresponding to abnormality on prior CT head, concern for metastatic lesion  -Given history of cecal mass, likely source, GI plans on doing c-scope Wednesday for evaluation   - Neurosurgery consulted  - Was on rocephin 1 g for UTI, discontinued on 7/30  -No further clinical symptoms,  repeat UA with improvement, blood cultures no growth to date  -Pt alert and oriented X3  -Palliative care consulted for decision making capacity, will involve daughters in all health care decisions

## 2019-08-02 NOTE — PLAN OF CARE
08/02/19 1523   Final Note   Assessment Type Final Discharge Note   Anticipated Discharge Disposition Home-Health   What phone number can be called within the next 1-3 days to see how you are doing after discharge? [phone]   Discharge plans and expectations educations in teach back method with documentation complete? Yes   Right Care Referral Info   Post Acute Recommendation Home-care   Referral Type Home Health    Facility Name [facility]

## 2019-08-02 NOTE — ASSESSMENT & PLAN NOTE
Noted on CT at [facility], [place], operated by [facility] ED-transferred to [facility] for neurosurgery eval. Has history of CVA with residual right-sided weakness.     -NS consulted  -MRI brain 7/29 with possible mets, low concern for repeat CVA, likely progression of cecal mass into brain  -Did not tolerate initial bowel prep, will try again with scheduled anti-emetics  -Colonoscopy 8/1 with GI, biopsy taken of cecal mass, cytology pending, will need close oncology follow-up after discharge

## 2019-08-02 NOTE — NURSING
Discharge instructions and prescriptions given and explained to pt and family.  Pt. verbalized understanding with no further questions.  Peripheral IV D/C'd with catheter tip intact.  VS WDL.  Patient is awaiting ride home by sister.      ROAD TEST  O=SpO2 96% on RA  A=Ambulating to bedside commode  D=IV D/C'd  T=Tolerating regular diet  E=Voids in bedside  S=Performs self care independently  T=Teaching on meds completed

## 2019-08-02 NOTE — ASSESSMENT & PLAN NOTE
Na 132- Likely in the setting of colonoscopy Go-lytely prep and associated nausea/vomitting as well as NPO status    -Resume diet  -Urine studies ordered, will f/u   -Pt alert and oriented (at her baseline since admission)

## 2019-08-03 LAB
BACTERIA BLD CULT: NORMAL
BACTERIA BLD CULT: NORMAL

## 2019-08-03 PROCEDURE — G0180 PR HOME HEALTH MD CERTIFICATION: ICD-10-PCS | Mod: ,,, | Performed by: FAMILY MEDICINE

## 2019-08-03 PROCEDURE — G0180 MD CERTIFICATION HHA PATIENT: HCPCS | Mod: ,,, | Performed by: FAMILY MEDICINE

## 2019-08-06 ENCOUNTER — PATIENT OUTREACH (OUTPATIENT)
Dept: ADMINISTRATIVE | Facility: CLINIC | Age: 64
End: 2019-08-06

## 2019-08-06 NOTE — PATIENT INSTRUCTIONS
Discharge Instructions for Stroke  You have been diagnosed with stroke, also known as a brain attack. During a stroke, blood stops flowing to part of your brain. This can damage areas in the brain that control other parts of the body. Symptoms after a stroke depend on which part of the brain has been affected.  Stroke Risk Factors  Once youve had a stroke, youre at greater risk for another one. Listed below are some other factors that can increase your risk for another stroke:  High blood pressure  High blood cholesterol  Cigarette or cigar smoking  Diabetes  Carotid or other artery disease  Atrial fibrillation, atrial flutter, or other heart disease  Physical inactivity  Obesity  Certain blood disorders (such as sickle cell anemia)  Excessive alcohol use  Abuse of illegal drugs  Race  Gender  Diet high in salty, fried, or greasy foods  Changes in Daily Living  Performing your regular tasks may be difficult after youve had a stroke, but you can learn new ways to manage your daily activities. In fact, doing daily activities may help you to regain muscle strength and bring back function to affected limbs. Be patient, give yourself time to adjust, and appreciate the progress you make.  Daily Activities  You may be at risk of falling. Make changes to your home to help you walk more easily. A therapist will decide if you need an assistive device to walk safely.  You may need to see an occupational or physical therapist to learn new ways of doing things. For example, you may need to make adjustments when bathing or dressing.  Try the following tips for showering or bathing:  Test the water temperature with a hand or foot that was not affected by the stroke.  Use grab bars, a shower seat, a hand-held showerhead, and a long-handled brush.  Try the following tips for dressing:  Dress while sitting, starting with the affected side or limb.  Wear shirts that pull easily over your head and pants or skirts with elastic  waistbands.  Use zippers with loops attached to the pull tabs.  Lifestyle Changes  Take your medications exactly as directed. Dont skip doses.  Change your diet if your doctor tells you to. Your doctor may suggest that you cut back on salt. If so, here are some tips:  Limit canned, dried, packaged, and fast foods. These tend to be high in salt.  When you cook, season foods with herbs instead of salt.  Dont add salt to your food at the table.  Begin an exercise program. Ask your doctor how to get started. You can benefit from simple activities such as walking or gardening.  Have no more than 2 alcoholic drinks a day.  Know your cholesterol level. Follow your doctors recommendations about how to keep cholesterol under control.  If you are a smoker, break the smoking habit. Enroll in a stop-smoking program to improve your chances of success. Ask your doctor about medications or other methods to help you quit.  Follow-Up  Keep your medical appointments. Close follow-up is important to stroke rehabilitation and recovery.  Some medications require blood tests to check for progress or problems. Keep follow-up appointments for any blood tests ordered by your doctors.    When to Seek Medical Care  Call 911 right away if you have any of the following:  Weakness, tingling, or loss of feeling on one side of your face or body  Sudden double vision or trouble seeing in one or both eyes  Sudden trouble talking or slurred speech  Trouble understanding others  Sudden, severe headache  Dizziness, loss of balance, or a sense of falling  Blackouts   © 0037-4876 [employer], [address] [place] [address], [place] [address]. All rights reserved. This information is not intended as a substitute for professional medical care. Always follow your healthcare professional's instructions.     Sepsis   Sepsis occurs when your body responds to bacteremia - the presence of bacteria in your bloodstream. Sepsis can be deadly. Blood pressure may drop  and the lungs and kidneys may start to fail. Emergency care for sepsis is crucial   When to Go to the Emergency Department (ED)   Sepsis is a medical emergency. Go to the nearest emergency department if a fever is present with any of these symptoms:   Chills and shaking   Fever or low body temperature (hypothermia)   Rapid heartbeat and breathing; shortness of breath   Nausea or vomiting   Confusion, dizziness   Skin rash   Decreased urination  © 9120-9325 [employer] [facility], [address] [place] [address], [place] [address]. All rights reserved. This information is not intended as a substitute for professional medical care. Always follow your healthcare professional's instructions

## 2019-08-07 ENCOUNTER — OFFICE VISIT (OUTPATIENT)
Dept: PRIMARY CARE CLINIC | Facility: CLINIC | Age: 64
End: 2019-08-07
Payer: MEDICARE

## 2019-08-07 VITALS
WEIGHT: 136 LBS | DIASTOLIC BLOOD PRESSURE: 64 MMHG | SYSTOLIC BLOOD PRESSURE: 96 MMHG | HEART RATE: 108 BPM | RESPIRATION RATE: 18 BRPM | BODY MASS INDEX: 22.66 KG/M2 | OXYGEN SATURATION: 99 % | TEMPERATURE: 98 F | HEIGHT: 65 IN

## 2019-08-07 DIAGNOSIS — I61.4 RIGHT-SIDED NONTRAUMATIC INTRACEREBRAL HEMORRHAGE OF CEREBELLUM: ICD-10-CM

## 2019-08-07 DIAGNOSIS — I69.392 CVA, OLD, FACIAL WEAKNESS: ICD-10-CM

## 2019-08-07 DIAGNOSIS — I69.359 CVA, OLD, HEMIPARESIS: ICD-10-CM

## 2019-08-07 DIAGNOSIS — G93.9 CEREBELLAR LESION: ICD-10-CM

## 2019-08-07 DIAGNOSIS — D49.0 CECAL NEOPLASM: Primary | ICD-10-CM

## 2019-08-07 DIAGNOSIS — I10 ESSENTIAL HYPERTENSION: ICD-10-CM

## 2019-08-07 DIAGNOSIS — K76.9 LIVER LESION: ICD-10-CM

## 2019-08-07 DIAGNOSIS — G93.41 ENCEPHALOPATHY, METABOLIC: ICD-10-CM

## 2019-08-07 DIAGNOSIS — E23.7 PITUITARY LESION: ICD-10-CM

## 2019-08-07 PROCEDURE — 99999 PR PBB SHADOW E&M-EST. PATIENT-LVL IV: ICD-10-PCS | Mod: PBBFAC,,, | Performed by: FAMILY MEDICINE

## 2019-08-07 PROCEDURE — 99999 PR PBB SHADOW E&M-EST. PATIENT-LVL IV: CPT | Mod: PBBFAC,,, | Performed by: FAMILY MEDICINE

## 2019-08-07 PROCEDURE — 99214 OFFICE O/P EST MOD 30 MIN: CPT | Mod: S$PBB,,, | Performed by: FAMILY MEDICINE

## 2019-08-07 PROCEDURE — 99214 PR OFFICE/OUTPT VISIT, EST, LEVL IV, 30-39 MIN: ICD-10-PCS | Mod: S$PBB,,, | Performed by: FAMILY MEDICINE

## 2019-08-07 PROCEDURE — 99214 OFFICE O/P EST MOD 30 MIN: CPT | Mod: PBBFAC,PN | Performed by: FAMILY MEDICINE

## 2019-08-07 NOTE — PROGRESS NOTES
Subjective:       Patient ID: Kayy Espinal is a 63 y.o. female.    Chief Complaint: Follow-up (hospital f/u for cancer)    HPI:--63-year-old female in hospital 08/03/2019--pt ws sick for awhile --lost 30-40 lbs in month Alot diarrhea. Pt was seen by her MD on [facility]--did lab patient was noted to be anemic and told to go to the hospital.  CT scan of the abdomen showed a cecal mass suggestive adenocarcinoma also noted to have a lot of liver mets left lobe 4.6 x 5.1 x 7 cm lesion in a bulky mass in the right lobe of the liver suggestive of multiple metastatic lesions       MRI of the brain shows a small lesion in the cerebellum due to history of a cecal lesion possibility of metastatic disease needs to be entertained in a 0.8 cm lesion in the left side of the pituitary gland suggestive of put to a pituitary Adenoma/ patient also had extensive microvascular disease with remote coronal radii out a infarct in chronic hypertensive encephalopathy with remote micro hemorrhages.      Had EGD and colonoscopy done in the hospital biopsies were taken but biopsies are pending       Had referral to Hem/Onc        History of CVA x3 9 years ago wanted home in 2 in the hospital affecting left side of the face right hemiparesis especially right arm arms almost flaccid right leg has some muscle strain---not able to walk well now.       Has Home Health for therapy.  Before admission to the hospital recently patient was up walking with a walker  ROS:  Skin: no psoriasis, eczema, skin cancer  HEENT: No headache, ocular pain, blurred vision, diplopia, epistaxis, hoarseness change in voice, thyroid trouble  Lung: No pneumonia, asthma, Tb, wheezing, SOB,No smoking   Heart: No chest pain, ankle edema, palpitations, MI, zara murmur,+ hypertension,  +hyperlipidemia--no stent bypass arrhythmia  Abdomen: No nausea, vomiting, diarrhea, constipation, ulcers, hepatitis, gallbladder disease, melena, hematochezia,  hematemesisSee HPI cecal mass, metastatic lesion to the liver  : no UTI, renal disease, stones   GYN   MS: no fractures, O/A, lupus, rheumatoid, gout  Neuro: No dizziness, LOC, seizures   + diabetes--running OK Glu , + anemia, + anxiety, + depression   , 5 children, lives with alone. Family since hospitalization has someone living with her.     Objective:   Physical Exam:  General: Well nourished, well developed, no acute distress +thin  Skin: No lesions  HEENT: Eyes PERRLA, EOM intact, nose patent, throat non-erythematous ears TMs clear left facial weakness especially with smiling  NECK: Supple, no bruits, No JVD, no nodes  Lungs: Clear, no rales, rhonchi, wheezing  Heart: Regular rate and rhythm, no murmurs, gallops, or rubs  Abdomen: flat, bowel sounds positive, no tenderness, or organomegaly  MS:  Flaccid right arm from old CVA 9 years ago weakness in the right leg able to lift and move leg-patient was walking with a walker prior to admission the hospital presently in a wheelchair has home health with physical therapy  Neuro: Alert, CN intact, oriented X 3  Extremities: No cyanosis, clubbing, or edema         Assessment:       1. Cecal neoplasm    2. Essential hypertension    3. Right-sided nontraumatic intracerebral hemorrhage of cerebellum    4. Encephalopathy, metabolic    5. Liver lesion    6. Cerebellar lesion    7. Pituitary lesion    8. CVA, old, facial weakness    9. CVA, old, hemiparesis        Plan:       Cecal neoplasm  -     Ambulatory referral to Hematology / Oncology    Essential hypertension  -     CBC auto differential; Future; Expected date: 08/07/2019  -     Comprehensive metabolic panel; Future; Expected date: 08/07/2019  -     EKG 12-lead; Future  -     Fecal Immunochemical Test (iFOBT); Future; Expected date: 08/07/2019  -     Lipid panel; Future; Expected date: 08/07/2019  -     POCT urine dipstick without microscope  -     T4, free; Future; Expected date: 08/07/2019  -     TSH;  Future; Expected date: 08/07/2019    Right-sided nontraumatic intracerebral hemorrhage of cerebellum    Encephalopathy, metabolic    Liver lesion  -     Ambulatory referral to Hematology / Oncology    Cerebellar lesion  -     Ambulatory referral to Hematology / Oncology    Pituitary lesion  -     Ambulatory referral to Hematology / Oncology    CVA, old, facial weakness    CVA, old, hemiparesis      recent hospital admission  Colonoscopy/EGD done--biopsy taken of cecal lesion--suspicious of adenocarcinoma  CT scan abdomen--metastatic lesions to the liver with one large lesion in the left lobe of the liver in another large lesion in the right lobe of the liver  MRI of the brain lesion cerebellum?  Metastatic/lesion left pituitary probable pituitary adenoma/microvascular disease with possible old stroke/chronic hypertensive encephalopathy with remote micro hemorrhages  Anemia hypertension anxiety depression diabetes old CVA  Old CVA -9 years ago 1 stroke at home too in the hospital--patient was up walking with a walker prior to admission the hospital presently getting physical therapy with home health  Appointment --get path report see if adenocarcinoma--probable metastatic disease to the liver possible metastatic disease to the brain--evaluate long-term treatment radiation/chemo/surgery or combination of all 3  Patient needs to repeat lab in 6 weeks CBCs CMP lipids T4 TSH stool guaiac UA  Health maintenance patient needs hepatitis C screen lipids tetanus Pneumovax bone density mammogram--would hold off on Health maintenance until after cancer workup is done

## 2019-08-09 ENCOUNTER — TELEPHONE (OUTPATIENT)
Dept: PRIMARY CARE CLINIC | Facility: CLINIC | Age: 64
End: 2019-08-09

## 2019-08-09 NOTE — TELEPHONE ENCOUNTER
Called patients daughter per Dr Cathi lou to take ibuprofen for pain patients daughter states understanding

## 2019-08-15 ENCOUNTER — TELEPHONE (OUTPATIENT)
Dept: PRIMARY CARE CLINIC | Facility: CLINIC | Age: 64
End: 2019-08-15

## 2019-08-15 NOTE — TELEPHONE ENCOUNTER
Spoke with [employer], verbalized that the patient has only seen Dr. Winkler once on 8/7/19 looks like a new patient to us. Another physician put in orders for patient to get a hospital bed and wheelchair for home use (Dr. Montiel). Message will be left for Karan.

## 2019-08-15 NOTE — TELEPHONE ENCOUNTER
----- Message from Beth Jaimes sent at 8/15/2019  9:15 AM CDT -----  Contact: Karan with [employer] phone [phone]  Karan with [employer] phone [phone], Calling to inquire about the order and Medical Necessity form that was faxed for hospital bed and wheelchair. Please call her. Thanks.

## 2019-08-16 ENCOUNTER — TELEPHONE (OUTPATIENT)
Dept: PRIMARY CARE CLINIC | Facility: CLINIC | Age: 64
End: 2019-08-16

## 2019-08-16 NOTE — TELEPHONE ENCOUNTER
----- Message from Enrique Muse sent at 8/16/2019 10:56 AM CDT -----  Type: Needs Medical Advice    Who Called:  Destiney/ Paula    Best Call Back Number: [phone]  Additional Information: Patient states that she would like a callback regarding the patient's Biopsy results

## 2019-08-16 NOTE — TELEPHONE ENCOUNTER
Called for patient's daughter, no answer. Daughter calling for biopsy results that were not ordered by our physician. Patient has to call Dr. Null's office.

## 2019-08-16 NOTE — TELEPHONE ENCOUNTER
----- Message from Masha Coppola sent at 8/16/2019  1:07 PM CDT -----  Pt's daughters came in states this patient was just release from the hospital and they said she should call this office for her last results of test that were done in the hospital. They stated that she has a bleed in the head and something with spine. They have a few questions for you.please call ashley at [phone]

## 2019-08-16 NOTE — TELEPHONE ENCOUNTER
Spoke with patient's daughter, ashley, verbalized that the patient needs to come in for a hsopital F/U and the physician can go over the results. Daughter verbalized understanding. Appointment scheduled.

## 2019-08-20 NOTE — PROGRESS NOTES
[facility] Hematolgy/Oncology  History & Physical    Subjective:      Patient ID:   NAME: Kayy Espinal : 1955     63 y.o. 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 She is here with her three daughters. She is in wheelchair.   She was recently discharged from [facility] on 2019 after having an acute petechial hemorrhage of the right cerebellum. She was previously admitted in 2019 with severe anemia and required 3 units of blood. She had a CT done at that time which showed  a bulky soft tissue mass seen in the cecum with irregular borders highly suspicious for cecal carcinoma along with enlarged liver containing numerous diffuse complex hypodensities highly suspicious for liver metastasis. It was recommended to her to have an inpatient colonoscopy at that time but she declined. She was seen by Dr Alex Douglas with GI with the recent admission and had colonoscopy on 2019 with biopsies.  She was seen by palliative care from [facility] [place], [place].     She had MRI of her head on 2019 which showed a concern for possible mets.    She is in wheelchair. She is breathing ok. No CP, SOB, HA's or N/V. She looks chronically debilitated. She has right sided weakness since she had stroke 9 yrs ago.       ROS:   GEN: normal without any fever, night sweats; postive weight loss  HEENT: normal with no HA's, sore throat, stiff neck, changes in vision  CV: normal with no CP, SOB, PND, JACOBSEN or orthopnea  PULM: normal with no SOB, cough, hemoptysis, sputum or pleuritic pain  GI: normal with no abdominal pain, nausea, vomiting, constipation, diarrhea, melanotic stools, BRBPR, or hematemesis  : normal with no hematuria, dysuria  BREAST: normal  with no mass, discharge, pain  SKIN: normal with no rash, erythema, bruising, or swelling       Past Medical/Surgical History:  Past Medical History:   Diagnosis Date    Anemia due to GI blood loss 8/21/2019    Anemia, unspecified 8/21/2019    Asthma     Brain metastasis 8/21/2019    Chronic anemia     Diabetes mellitus     Hypertension     Stroke      Past Surgical History:   Procedure Laterality Date    COLONOSCOPY N/A 8/1/2019    [facility] (2ND FLR)    ESOPHAGOGASTRODUODENOSCOPY (EGD) N/A 6/12/2019    Performed by [facility] at [facility]         Allergies:  Review of patient's allergies indicates:  No Known Allergies    Social/Family History:  Social History     Socioeconomic History    Marital status:      Spouse name: Not on file    Number of children: Not on file    Years of education: Not on file    Highest education level: Not on file   Occupational History    Not on file   Social Needs    Financial resource strain: Not on file    Food insecurity:     Worry: Not on file     Inability: Not on file    Transportation needs:     Medical: Not on file     Non-medical: Not on file   Tobacco Use    Smoking status: Never Smoker    Smokeless tobacco: Never Used   Substance and Sexual Activity    Alcohol use: Not Currently    Drug use: Not Currently    Sexual activity: Not Currently   Lifestyle    Physical activity:     Days per week: Not on file     Minutes per session: Not on file    Stress: Not on file   Relationships    Social connections:     Talks on phone: Not on file     Gets together: Not on file     Attends Mandaeism service: Not on file     Active member of club or organization: Not on file     Attends meetings of clubs or organizations: Not on file     Relationship status: Not on file   Other Topics Concern    Not on file   Social History Narrative    Not on file     Family History   Problem Relation Age of Onset    Diabetes  Mother     Hypertension Mother          Medications:    Current Outpatient Medications:     acetaminophen (TYLENOL) 100 mg/mL suspension, Take by mouth every 4 (four) hours as needed for Temperature greater than., Disp: , Rfl:     amLODIPine (NORVASC) 10 MG tablet, Take 1 tablet (10 mg total) by mouth once daily., Disp: 30 tablet, Rfl: 11    atorvastatin (LIPITOR) 40 MG tablet, Take 1 tablet (40 mg total) by mouth once daily., Disp: 90 tablet, Rfl: 0    esomeprazole (NEXIUM) 20 MG capsule, Take 1 capsule (20 mg total) by mouth before breakfast., Disp: 30 capsule, Rfl: 11    losartan (COZAAR) 50 MG tablet, Take 1 tablet (50 mg total) by mouth once daily., Disp: 90 tablet, Rfl: 0      Pathology:  Cancer Staging  No matching staging information was found for the patient.      Colonoscopy with biopsies  8/1/2019:    FINAL PATHOLOGIC DIAGNOSIS  1. Cecal mass, biopsy: Moderately differentiated adenocarcinoma, see comment  Comment: The specimen shows infiltrating malignant glands with surface mucosal ulceration. Definitive  lymphovascular invasion and perineural invasion are NOT identified    Supplemental Diagnosis  THE RESULTS OF MMR IMMUNOSTAINS ARE AS FOLLOWS:  IMMUNOSTAINS FOR PMS2, MSH6, MSH2 AND MLH1 ALL SHOW INTACT NUCLEAR EXPRESSION. ALL THE  CONTROLS STAIN APPROPRIATELY.  NOTE: WITH INTACT NUCLEAR EXPRESSION THE PROBABILITY OF MICROSATELLITE INSTABILITY IS  LOW. WE ADVISE CORRELATION WITH CLINICAL FINDINGS.    Objective:   Vitals:  Blood pressure 99/67, pulse 106, temperature 97.9 °F (36.6 °C), temperature source Oral, resp. rate 20, weight 61.7 kg (136 lb).    Physical Examination:   GEN: no apparent distress, comfortable; AAOx3; chronically debilitated  HEAD: atraumatic and normocephalic  EYES: no pallor, no icterus, PERRLA  ENT: OMM, no pharyngeal erythema, external ears WNL; no nasal discharge; no thrush  NECK: no masses, thyroid normal, trachea midline, no LAD/LN's, supple  CV: RRR with no murmur;  normal pulse; normal S1 and S2; no pedal edema  CHEST: Normal respiratory effort; CTAB; normal breath sounds; no wheeze or crackles  ABDOM: nontender and nondistended; soft; normal bowel sounds; no rebound/guarding  MUSC/Skeletal: wheelchair  EXTREM: no clubbing, cyanosis, inflammation or swelling  SKIN: no rashes, lesions, ulcers, petechiae or subcutaneous nodules  : no teran  NEURO: right sided weakness; wheelchair; no tremors; alert  PSYCH: normal mood, affect and behavior  LYMPH: normal cervical, supraclavicular, axillary and groin LN's      Labs:   Lab Results   Component Value Date    WBC 11.82 08/02/2019    HGB 9.1 (L) 08/02/2019    HCT 30.4 (L) 08/02/2019    MCV 74 (L) 08/02/2019     (H) 08/02/2019    CMP  Sodium   Date Value Ref Range Status   08/02/2019 138 136 - 145 mmol/L Final     Potassium   Date Value Ref Range Status   08/02/2019 3.3 (L) 3.5 - 5.1 mmol/L Final     Chloride   Date Value Ref Range Status   08/02/2019 96 95 - 110 mmol/L Final     CO2   Date Value Ref Range Status   08/02/2019 30 (H) 23 - 29 mmol/L Final     Glucose   Date Value Ref Range Status   08/02/2019 81 70 - 110 mg/dL Final     BUN, Bld   Date Value Ref Range Status   08/02/2019 11 8 - 23 mg/dL Final     Creatinine   Date Value Ref Range Status   08/02/2019 0.8 0.5 - 1.4 mg/dL Final     Calcium   Date Value Ref Range Status   08/02/2019 9.1 8.7 - 10.5 mg/dL Final     Total Protein   Date Value Ref Range Status   08/02/2019 6.8 6.0 - 8.4 g/dL Final     Albumin   Date Value Ref Range Status   08/02/2019 2.0 (L) 3.5 - 5.2 g/dL Final     Total Bilirubin   Date Value Ref Range Status   08/02/2019 0.7 0.1 - 1.0 mg/dL Final     Comment:     For infants and newborns, interpretation of results should be based  on gestational age, weight and in agreement with clinical  observations.  Premature Infant recommended reference ranges:  Up to 24 hours.............<8.0 mg/dL  Up to 48 hours............<12.0 mg/dL  3-5  days..................<15.0 mg/dL  6-29 days.................<15.0 mg/dL       Alkaline Phosphatase   Date Value Ref Range Status   08/02/2019 133 55 - 135 U/L Final     AST   Date Value Ref Range Status   08/02/2019 45 (H) 10 - 40 U/L Final     ALT   Date Value Ref Range Status   08/02/2019 9 (L) 10 - 44 U/L Final     Anion Gap   Date Value Ref Range Status   08/02/2019 12 8 - 16 mmol/L Final     eGFR if    Date Value Ref Range Status   08/02/2019 >60.0 >60 mL/min/1.73 m^2 Final     eGFR if non    Date Value Ref Range Status   08/02/2019 >60.0 >60 mL/min/1.73 m^2 Final     Comment:     Calculation used to obtain the estimated glomerular filtration  rate (eGFR) is the CKD-EPI equation.            Radiology/Diagnostic Studies:    CT abdom  7/28/2019;    Impression       Cecal mass similar with 06/12/2019 consistent with adenocarcinoma.  Multiple confluent liver metastasis unchanged.  No bowel dilation or acute inflammatory process found.    Normal appendix.  No urolithiasis or hydronephrosis.    Gallstones in an otherwise normal gallbladder without duct dilation.       CT Head 7/28/2019  Impression       Small acute petechial hemorrhage of the right inferomedial cerebellum of uncertain etiology.  No vasogenic edema associated.    Old infarcts of the left frontal lobe.    No acute infarct.       MRI Brain 7/29/2019;    Impression       Small enhancing lesion within the cerebellum on the right corresponding to abnormality on prior CT head.  Given the findings on prior CT of the abdomen, this is concerning for a metastatic lesion.  Minimal surrounding edema without significant mass effect.    Extensive chronic microvascular ischemic changes with remote left corona radiata infarct and findings suggestive of chronic hypertensive encephalopathy with multiple remote microhemorrhages.    0.8 cm lesion within the pituitary gland on the left.  Statistically this likely represents a pituitary  microadenoma.  Given the concern for metastatic disease, metastatic lesion would be in the differential but is felt less likely.       CXR  7/29/2019:  FINDINGS:  Heart size is normal.  There is mild increase in the pulmonary vascular and interstitial markings.  The lungs are fairly well aerated..  Few increased markings particular at the left base.  No pneumothorax.      Impression       See above         All lab results and imaging results have been reviewed and discussed with the patient    Assessment:   (1) 63 y.o. female with recent diagnosis of cecal mass with brain and liver lesions worrisome for a metastatic process who has been referred by Dr Winkler for evaluation by medical oncology.    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 about 9 years  - she had recent right cerebellar petechial hemorrhage   - she is wheelchair bound and chronically debilitated since recent bleed  - she is getting at home PT      (3) Hx/of metabolic encephalopathy    (4) Pituitary lesion    (5) Chronic anemia with microcytic indices - latest hgb was 9.1    (6) HTN and hypercholesterolemia    (7) DM        VISIT DIAGNOSES:              Cecal neoplasm  -     CBC auto differential; Standing  -     Comprehensive metabolic panel; Standing  -     CEA; Standing  -     NM PET CT Routine Skull to Mid Thigh; Future; Expected date: 08/21/2019    Pituitary lesion  -     CBC auto differential; Standing  -     Comprehensive metabolic panel; Standing  -     CEA; Standing    Liver lesion  -     CBC auto differential; Standing  -     Comprehensive metabolic panel; Standing  -     CEA; Standing  -     NM PET CT Routine  Skull to Mid Thigh; Future; Expected date: 08/21/2019    Malignant neoplasm of colon, unspecified part of colon   -     CEA; Standing  -     NM PET CT Routine Skull to Mid Thigh; Future; Expected date: 08/21/2019  -     Ambulatory referral to General Surgery  -     Ambulatory referral to Radiation Oncology  -     Iron and TIBC; Standing  -     Ferritin; Standing    Malignant neoplasm of cecum   -     NM PET CT Routine Skull to Mid Thigh; Future; Expected date: 08/21/2019  -     Ambulatory referral to General Surgery  -     Ambulatory referral to Radiation Oncology  -     Iron and TIBC; Standing  -     Ferritin; Standing    Anemia, unspecified type    Anemia due to GI blood loss    Brain metastasis            Plan:     PLAN:  1. Set up PET scan  2. Check basic labs and CEA level  3. Currently, she is in no condition to undergo chemotherapy  4. Refer to Rad/onc and Gen Surg  6. F/u with GI and PCP  RTC in 2-3 weeks     Fax note to Jaime Winkler MD; Wilber Duarte; Cathy Singer Picariello         I have explained and the patient understands all of  the current recommendation(s). I have answered all of their questions to the best of my ability and to their complete satisfaction.             Thank you for allowing me to participate in this patient's care. Please call with any questions or concerns.    Electronically signed Chaz Gates MD

## 2019-08-21 ENCOUNTER — OFFICE VISIT (OUTPATIENT)
Dept: HEMATOLOGY/ONCOLOGY | Facility: CLINIC | Age: 64
End: 2019-08-21
Payer: MEDICARE

## 2019-08-21 ENCOUNTER — LAB VISIT (OUTPATIENT)
Dept: LAB | Facility: HOSPITAL | Age: 64
End: 2019-08-21
Attending: INTERNAL MEDICINE
Payer: MEDICARE

## 2019-08-21 VITALS
TEMPERATURE: 98 F | HEART RATE: 106 BPM | RESPIRATION RATE: 20 BRPM | DIASTOLIC BLOOD PRESSURE: 67 MMHG | BODY MASS INDEX: 22.63 KG/M2 | WEIGHT: 136 LBS | SYSTOLIC BLOOD PRESSURE: 99 MMHG

## 2019-08-21 DIAGNOSIS — D50.0 ANEMIA DUE TO GI BLOOD LOSS: ICD-10-CM

## 2019-08-21 DIAGNOSIS — C18.0 MALIGNANT NEOPLASM OF CECUM: ICD-10-CM

## 2019-08-21 DIAGNOSIS — C18.9 MALIGNANT NEOPLASM OF COLON, UNSPECIFIED PART OF COLON: ICD-10-CM

## 2019-08-21 DIAGNOSIS — D64.9 ANEMIA, UNSPECIFIED TYPE: ICD-10-CM

## 2019-08-21 DIAGNOSIS — C79.31 BRAIN METASTASIS: ICD-10-CM

## 2019-08-21 DIAGNOSIS — E23.7 PITUITARY LESION: ICD-10-CM

## 2019-08-21 DIAGNOSIS — K76.9 LIVER LESION: ICD-10-CM

## 2019-08-21 DIAGNOSIS — D49.0 CECAL NEOPLASM: ICD-10-CM

## 2019-08-21 DIAGNOSIS — D49.0 CECAL NEOPLASM: Primary | ICD-10-CM

## 2019-08-21 LAB
ALBUMIN SERPL BCP-MCNC: 2.3 G/DL (ref 3.5–5.2)
ALP SERPL-CCNC: 200 U/L (ref 55–135)
ALT SERPL W/O P-5'-P-CCNC: 20 U/L (ref 10–44)
ANION GAP SERPL CALC-SCNC: 13 MMOL/L (ref 8–16)
AST SERPL-CCNC: 74 U/L (ref 10–40)
BILIRUB SERPL-MCNC: 1 MG/DL (ref 0.1–1)
BUN SERPL-MCNC: 32 MG/DL (ref 8–23)
CALCIUM SERPL-MCNC: 9 MG/DL (ref 8.7–10.5)
CEA SERPL-MCNC: 51.9 NG/ML (ref 0–5)
CHLORIDE SERPL-SCNC: 91 MMOL/L (ref 95–110)
CO2 SERPL-SCNC: 30 MMOL/L (ref 23–29)
CREAT SERPL-MCNC: 1.2 MG/DL (ref 0.5–1.4)
EST. GFR  (AFRICAN AMERICAN): 54.8 ML/MIN/1.73 M^2
EST. GFR  (NON AFRICAN AMERICAN): 47.5 ML/MIN/1.73 M^2
FERRITIN SERPL-MCNC: 249 NG/ML (ref 20–300)
GLUCOSE SERPL-MCNC: 87 MG/DL (ref 70–110)
IRON SERPL-MCNC: 21 UG/DL (ref 30–160)
POTASSIUM SERPL-SCNC: 3.6 MMOL/L (ref 3.5–5.1)
PROT SERPL-MCNC: 8 G/DL (ref 6–8.4)
SATURATED IRON: 11 % (ref 20–50)
SODIUM SERPL-SCNC: 134 MMOL/L (ref 136–145)
TOTAL IRON BINDING CAPACITY: 186 UG/DL (ref 250–450)
TRANSFERRIN SERPL-MCNC: 133 MG/DL (ref 200–375)

## 2019-08-21 PROCEDURE — 99204 PR OFFICE/OUTPT VISIT, NEW, LEVL IV, 45-59 MIN: ICD-10-PCS | Mod: S$GLB,,, | Performed by: INTERNAL MEDICINE

## 2019-08-21 PROCEDURE — 82728 ASSAY OF FERRITIN: CPT

## 2019-08-21 PROCEDURE — 99204 OFFICE O/P NEW MOD 45 MIN: CPT | Mod: S$GLB,,, | Performed by: INTERNAL MEDICINE

## 2019-08-21 PROCEDURE — 80053 COMPREHEN METABOLIC PANEL: CPT

## 2019-08-21 PROCEDURE — 82378 CARCINOEMBRYONIC ANTIGEN: CPT

## 2019-08-21 PROCEDURE — 83540 ASSAY OF IRON: CPT

## 2019-08-22 ENCOUNTER — OFFICE VISIT (OUTPATIENT)
Dept: SURGERY | Facility: CLINIC | Age: 64
End: 2019-08-22
Payer: MEDICARE

## 2019-08-22 VITALS
TEMPERATURE: 98 F | HEIGHT: 65 IN | WEIGHT: 136 LBS | SYSTOLIC BLOOD PRESSURE: 100 MMHG | DIASTOLIC BLOOD PRESSURE: 72 MMHG | BODY MASS INDEX: 22.66 KG/M2

## 2019-08-22 DIAGNOSIS — C78.7 LIVER METASTASES: ICD-10-CM

## 2019-08-22 DIAGNOSIS — I10 ESSENTIAL HYPERTENSION: Primary | ICD-10-CM

## 2019-08-22 DIAGNOSIS — C18.0 MALIGNANT NEOPLASM OF CECUM: ICD-10-CM

## 2019-08-22 PROCEDURE — 99204 OFFICE O/P NEW MOD 45 MIN: CPT | Mod: S$PBB,,, | Performed by: SURGERY

## 2019-08-22 PROCEDURE — 99999 PR PBB SHADOW E&M-EST. PATIENT-LVL III: CPT | Mod: PBBFAC,,, | Performed by: SURGERY

## 2019-08-22 PROCEDURE — 99213 OFFICE O/P EST LOW 20 MIN: CPT | Mod: PBBFAC | Performed by: SURGERY

## 2019-08-22 PROCEDURE — 99204 PR OFFICE/OUTPT VISIT, NEW, LEVL IV, 45-59 MIN: ICD-10-PCS | Mod: S$PBB,,, | Performed by: SURGERY

## 2019-08-22 PROCEDURE — 99999 PR PBB SHADOW E&M-EST. PATIENT-LVL III: ICD-10-PCS | Mod: PBBFAC,,, | Performed by: SURGERY

## 2019-08-22 NOTE — PROGRESS NOTES
Subjective:       Patient ID: Kayy Espinal is a 63 y.o. female.    Chief Complaint: Other (Referred by  to eval cecal neoplasm)      HPI:  Patient presents for evaluation of cecal cancer.  Diagnosis and confirmed by colonoscopy with biopsy.  Patient also has bulky metastasis to both lobes of the liver. Patient is in quite poor shape having lost over 100 lb.  Has received multiple blood transfusions.  Has been evaluated by an oncologist who referred her here.  Patient is deemed to be too ill for chemotherapy.  Patient and her family would like to discuss surgical options.  Patient does not have any obstructive symptoms.    Past Medical History:   Diagnosis Date    Anemia due to GI blood loss 8/21/2019    Anemia, unspecified 8/21/2019    Asthma     Brain metastasis 8/21/2019    Chronic anemia     Diabetes mellitus, type 2     Hypertension     Stroke      Past Surgical History:   Procedure Laterality Date    COLONOSCOPY N/A 8/1/2019    [facility]formed [facility] ENDO (2ND FLR)    ESOPHAGOGASTRODUODENOSCOPY (EGD) N/A 6/12/2019    [facility]    TONSILLECTOMY       Review of patient's allergies indicates:  No Known Allergies  Medication List with Changes/Refills   Current Medications    ACETAMINOPHEN (TYLENOL) 100 MG/ML SUSPENSION    Take by mouth every 4 (four) hours as needed for Temperature greater than.    AMLODIPINE (NORVASC) 10 MG TABLET    Take 1 tablet (10 mg total) by mouth once daily.    ATORVASTATIN (LIPITOR) 40 MG TABLET    Take 1 tablet (40 mg total) by mouth once daily.    ESOMEPRAZOLE (NEXIUM) 20 MG CAPSULE    Take 1 capsule (20 mg total) by mouth before breakfast.    LOSARTAN (COZAAR) 50 MG TABLET    Take 1 tablet (50 mg total) by mouth once daily.     Family History   Problem Relation Age of Onset    Diabetes Mother     Hypertension Mother      Social History     Socioeconomic History    Marital status:      Spouse name:  Not on file    Number of children: Not on file    Years of education: Not on file    Highest education level: Not on file   Occupational History    Not on file   Social Needs    Financial resource strain: Not on file    Food insecurity:     Worry: Not on file     Inability: Not on file    Transportation needs:     Medical: Not on file     Non-medical: Not on file   Tobacco Use    Smoking status: Never Smoker    Smokeless tobacco: Never Used   Substance and Sexual Activity    Alcohol use: Not Currently    Drug use: Not Currently    Sexual activity: Not Currently   Lifestyle    Physical activity:     Days per week: Not on file     Minutes per session: Not on file    Stress: Not on file   Relationships    Social connections:     Talks on phone: Not on file     Gets together: Not on file     Attends Pentecostal service: Not on file     Active member of club or organization: Not on file     Attends meetings of clubs or organizations: Not on file     Relationship status: Not on file   Other Topics Concern    Not on file   Social History Narrative    Not on file         Review of Systems   Constitutional: Negative for appetite change, chills, fever and unexpected weight change.   HENT: Negative for hearing loss, rhinorrhea, sore throat and voice change.    Eyes: Negative for photophobia and visual disturbance.   Respiratory: Negative for cough, choking and shortness of breath.    Cardiovascular: Negative for chest pain, palpitations and leg swelling.   Gastrointestinal: Negative for abdominal pain, blood in stool, constipation, diarrhea, nausea and vomiting.   Endocrine: Negative for cold intolerance, heat intolerance and polyphagia.   Genitourinary: Negative for dysuria.   Musculoskeletal: Negative for arthralgias and back pain.   Skin: Negative for color change.   Neurological: Negative for dizziness, seizures, syncope and headaches.   Hematological: Negative for adenopathy. Does not bruise/bleed easily.        Objective:      Physical Exam   Constitutional: She appears well-developed and well-nourished. She appears lethargic.  Non-toxic appearance. No distress.   Patient very weak.  Cachectic.  In wheelchair.   HENT:   Head: Normocephalic and atraumatic. Head is without abrasion and without laceration.   Right Ear: External ear normal.   Left Ear: External ear normal.   Nose: Nose normal.   Mouth/Throat: Oropharynx is clear and moist.   Eyes: Pupils are equal, round, and reactive to light. EOM are normal.   Neck: Trachea normal. No tracheal deviation and normal range of motion present. No thyroid mass and no thyromegaly present.   Cardiovascular: Normal rate and regular rhythm.   Pulmonary/Chest: Effort normal. No accessory muscle usage. No tachypnea. No respiratory distress.   Abdominal: Soft. Normal appearance and bowel sounds are normal. She exhibits no distension and no mass. There is hepatomegaly. There is tenderness in the right upper quadrant. There is no tenderness at McBurney's point and negative Davenport's sign. No hernia.   Lymphadenopathy:     She has no cervical adenopathy.     She has no axillary adenopathy.        Right: No inguinal adenopathy present.        Left: No inguinal adenopathy present.   Neurological: She appears lethargic. Coordination and gait normal.   Patient had a stroke she has obvious neurologic deficits on the right side as well as facial deficits.  She is coherent however has some difficulty talking   Skin: Skin is warm and intact.   Psychiatric: Her behavior is normal. Her speech is slurred.   Patient is oriented and coherent however falling asleep during the interview       Assessment/Plan:   Essential hypertension    Malignant neoplasm of cecum     Liver metastases      Patient does not look like she could tolerate an operation.  Furthermore since patient is not a candidate for chemotherapy we cannot operate on her for possible cure.  Since she is not obstructed she does not need  palliative surgery from that standpoint.  Tumor may be bleeding contributing to her anemia but that could be managed for blood transfusions.  In summary LS new information or symptoms comfortable light I cannot recommend operative intervention.  I have discussed this at length with patient and her daughters and that they agree with me.      Follow up if symptoms worsen or fail to improve.

## 2019-08-22 NOTE — LETTER
August 22, 2019      Chaz Gates MD  [address]  [address]  [place] [address]           [facility]  [address]  [place] [address]  Phone: [phone]  Fax: [phone]          Patient: Kayy Espinal   MR Number: [id number]   YOB: 1955   Date of Visit: 8/22/2019       Dear Dr. Chaz Gates:    Thank you for referring Kayy Espinal to me for evaluation. Attached you will find relevant portions of my assessment and plan of care.    If you have questions, please do not hesitate to call me. I look forward to following Kayy Espinal along with you.    Sincerely,    Joel Morgan MD    Enclosure  CC:  No Recipients    If you would like to receive this communication electronically, please contact [email or web address] or [phone] to request more information on [employer] access.    For providers and/or their staff who would like to refer a patient to [facility], please contact us through our one-stop-shop provider referral line, [facility], at [phone].    If you feel you have received this communication in error or would no longer like to receive these types of communications, please e-mail [email or web address]

## 2019-08-26 ENCOUNTER — CLINICAL SUPPORT (OUTPATIENT)
Dept: PRIMARY CARE CLINIC | Facility: CLINIC | Age: 64
End: 2019-08-26
Payer: MEDICARE

## 2019-08-26 ENCOUNTER — OFFICE VISIT (OUTPATIENT)
Dept: PRIMARY CARE CLINIC | Facility: CLINIC | Age: 64
End: 2019-08-26
Payer: MEDICARE

## 2019-08-26 VITALS — SYSTOLIC BLOOD PRESSURE: 93 MMHG | HEART RATE: 106 BPM | DIASTOLIC BLOOD PRESSURE: 62 MMHG

## 2019-08-26 DIAGNOSIS — C18.9 MALIGNANT NEOPLASM OF COLON, UNSPECIFIED PART OF COLON: Primary | ICD-10-CM

## 2019-08-26 DIAGNOSIS — I69.359 CVA, OLD, HEMIPARESIS: ICD-10-CM

## 2019-08-26 DIAGNOSIS — C79.31 BRAIN METASTASIS: ICD-10-CM

## 2019-08-26 DIAGNOSIS — I61.4 RIGHT-SIDED NONTRAUMATIC INTRACEREBRAL HEMORRHAGE OF CEREBELLUM: ICD-10-CM

## 2019-08-26 DIAGNOSIS — E23.7 PITUITARY LESION: ICD-10-CM

## 2019-08-26 DIAGNOSIS — C18.9 MALIGNANT NEOPLASM OF COLON, UNSPECIFIED PART OF COLON: ICD-10-CM

## 2019-08-26 DIAGNOSIS — K76.9 LIVER LESION: ICD-10-CM

## 2019-08-26 LAB
ACANTHOCYTES BLD QL SMEAR: PRESENT
ANISOCYTOSIS BLD QL SMEAR: SLIGHT
BASOPHILS # BLD AUTO: 0 K/UL (ref 0–0.2)
BASOPHILS NFR BLD: 0.3 % (ref 0–1.9)
DIFFERENTIAL METHOD: ABNORMAL
EOSINOPHIL # BLD AUTO: 0 K/UL (ref 0–0.5)
EOSINOPHIL NFR BLD: 0 % (ref 0–8)
ERYTHROCYTE [DISTWIDTH] IN BLOOD BY AUTOMATED COUNT: 24.8 % (ref 11.5–14.5)
HCT VFR BLD AUTO: 28.7 % (ref 37–48.5)
HGB BLD-MCNC: 9.3 G/DL (ref 12–16)
LYMPHOCYTES # BLD AUTO: 1.6 K/UL (ref 1–4.8)
LYMPHOCYTES NFR BLD: 11.5 % (ref 18–48)
MCH RBC QN AUTO: 23.5 PG (ref 27–31)
MCHC RBC AUTO-ENTMCNC: 32.3 G/DL (ref 32–36)
MCV RBC AUTO: 73 FL (ref 82–98)
MONOCYTES # BLD AUTO: 1.6 K/UL (ref 0.3–1)
MONOCYTES NFR BLD: 11.8 % (ref 4–15)
NEUTROPHILS # BLD AUTO: 10.4 K/UL (ref 1.8–7.7)
NEUTROPHILS NFR BLD: 76.4 % (ref 38–73)
PLATELET # BLD AUTO: 701 K/UL (ref 150–350)
PMV BLD AUTO: 8.4 FL (ref 9.2–12.9)
RBC # BLD AUTO: 3.95 M/UL (ref 4–5.4)
SCHISTOCYTES BLD QL SMEAR: PRESENT
WBC # BLD AUTO: 13.6 K/UL (ref 3.9–12.7)

## 2019-08-26 PROCEDURE — 85025 COMPLETE CBC W/AUTO DIFF WBC: CPT

## 2019-08-26 PROCEDURE — 36415 COLL VENOUS BLD VENIPUNCTURE: CPT | Mod: PBBFAC,PN

## 2019-08-26 PROCEDURE — 99213 OFFICE O/P EST LOW 20 MIN: CPT | Mod: PBBFAC,PN | Performed by: FAMILY MEDICINE

## 2019-08-26 PROCEDURE — 99999 PR PBB SHADOW E&M-EST. PATIENT-LVL III: ICD-10-PCS | Mod: PBBFAC,,, | Performed by: FAMILY MEDICINE

## 2019-08-26 PROCEDURE — 99214 PR OFFICE/OUTPT VISIT, EST, LEVL IV, 30-39 MIN: ICD-10-PCS | Mod: S$PBB,,, | Performed by: FAMILY MEDICINE

## 2019-08-26 PROCEDURE — 99214 OFFICE O/P EST MOD 30 MIN: CPT | Mod: S$PBB,,, | Performed by: FAMILY MEDICINE

## 2019-08-26 PROCEDURE — 99999 PR PBB SHADOW E&M-EST. PATIENT-LVL III: CPT | Mod: PBBFAC,,, | Performed by: FAMILY MEDICINE

## 2019-08-26 RX ORDER — HYDROCODONE BITARTRATE AND ACETAMINOPHEN 5; 325 MG/1; MG/1
1 TABLET ORAL EVERY 8 HOURS PRN
Qty: 100 TABLET | Refills: 0 | Status: SHIPPED | OUTPATIENT
Start: 2019-08-26

## 2019-08-26 NOTE — PROGRESS NOTES
Subjective:       Patient ID: Kayy Espinal is a 63 y.o. female.    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       Not eating well, + BM, able to get an in out of the wheelchair with help       Bleeding from rectum. Pt seen Dr Gates-- referred pt to surgeon. Dr Morgan--general surgeon in [place].  Family was told that she is not a good surgical candidate would not put her through that. Rectal bleeding just started last night--bleeding is with bowel movements passes between at tbsp in a cup of blood.  Has only had 1 episode.       offiice visit 8/07/[id number]-ywry-old female in hospital 08/03/2019--pt ws sick for awhile --lost 30-40 lbs in month Alot diarrhea. Pt was seen by her MD on [facility]--did lab patient was noted to be anemic and told to go to the hospital.  CT scan of the abdomen showed a cecal mass suggestive adenocarcinoma also noted to have a lot of liver mets left lobe 4.6 x 5.1 x 7 cm lesion in a bulky mass in the right lobe of the liver suggestive of multiple metastatic lesions       MRI of the brain shows a small lesion in the cerebellum due to history of a cecal lesion possibility of metastatic disease needs to be entertained in a 0.8 cm lesion in the left side of the pituitary gland suggestive of put to a pituitary Adenoma/ patient also had extensive microvascular disease with remote coronal radii out a infarct in chronic hypertensive encephalopathy with remote micro hemorrhages.      Had EGD and colonoscopy done in the hospital biopsies were taken but biopsies are pending       Had referral to Hem/Onc        History of CVA x3 9 years ago wanted home in 2 in the hospital affecting left side of the face right hemiparesis especially right arm arms almost  flaccid right leg has some muscle strain---not able to walk well now.       Has Home Health for therapy.  Before admission to the hospital recently patient was up walking with a walker  ROS:  Skin: no psoriasis, eczema, skin cancer  HEENT: No headache, ocular pain, blurred vision, diplopia, epistaxis, hoarseness change in voice, thyroid trouble  Lung: No pneumonia, asthma, Tb, wheezing, SOB,No smoking   Heart: No chest pain, ankle edema, palpitations, MI, zara murmur,+ hypertension-patient is on amlodipine 10 and Cozaar 5  +hyperlipidemia--no stent bypass arrhythmia  Abdomen: No nausea, vomiting, diarrhea, constipation, ulcers, hepatitis, gallbladder disease, melena, hematochezia, hematemesisSee HPI cecal mass, metastatic lesion to the liver  : no UTI, renal disease, stones   GYN   MS: no fractures, O/A, lupus, rheumatoid, gout  Neuro: No dizziness, LOC, seizures   + diabetes--running OK Glu , + anemia, + anxiety, + depression   , 5 children, lives with alone. Family since hospitalization has someone living with her.     Objective:   Physical Exam:  General: Well nourished, well developed, no acute distress +thin sitting up in wheelchair no acute distress  Skin: No lesions  HEENT: Eyes PERRLA, EOM intact, nose patent, throat non-erythematous ears TMs clear left facial weakness especially with smiling  NECK: Supple, no bruits, No JVD, no nodes  Lungs: Clear, no rales, rhonchi, wheezing  Heart: Regular rate and rhythm, no murmurs, gallops, or rubs  Abdomen: flat, bowel sounds positive, no tenderness, or organomegaly  MS:  Flaccid right arm from old CVA 9 years ago weakness in the right leg able to lift and move leg-patient was walking with a walker prior to admission the hospital presently in a wheelchair has home health with physical therapy  Neuro: Alert, CN intact, oriented X 3  Extremities: No cyanosis, clubbing, or edema         Assessment:       1. Malignant neoplasm of colon, unspecified part of  colon    2. Brain metastasis    3. Pituitary lesion    4. Liver lesion    5. CVA, old, hemiparesis    6. 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  Dispense: 100 tablet; Refill: 0      patient with recent EGD and colonoscopy with biopsy of cecal lesion showing adenocarcinoma--CT scan shows probable liver metastasis  CT scan abdomen--metastatic lesions to the liver with one large lesion in the left lobe of the liver in another large lesion in the right lobe of the liver  MRI of the brain lesion cerebellum?  Metastatic/lesion left pituitary probable pituitary adenoma/microvascular disease with possible old stroke/chronic hypertensive encephalopathy with remote micro hemorrhages  Anemia hypertension anxiety depression diabetes old CVA  Old CVA -9 years ago 1 stroke at home too in the hospital--patient was up walking with a walker prior to admission the hospital presently getting physical therapy with home health  Appointment  8-28-19--pt sent to general surgeon--felt patient was not a surgical candidate--patient's family needs to get back in touch with Dr. roca see what alternative treatment planning may have available--radiation etc also needs plan if rebleeds to see surgreon for palliative fulgaration or cauterization  Family wants handicap license/FMLA form filled out so daughter able to care for mother/bedside commode/hospital bed/wheelchair has a shower bench--does not desire hospice at this time  Patient having abdominal pain will give Vicodin for pain needs to be  careful for constipation  CBC now  Routine lab should be done probably by Dr. Gates  Family told free bleeds to bring patient to [facility] for general surgeon to evaluate for possible fulguration of lesion to stop bleeding palliative at best   Blood pressure noted to be low 92/63 pulse 106--patient told to split the blood pressure medications not give both at the same time take Cozaar 50 mg q.a.m. decrease amlodipine to 5 mg q.p.m. hold if blood pressure less than systolic of 100 diastolic of 60--needs to get an arm blood pressure cuff to check blood pressure.

## 2019-08-27 ENCOUNTER — TELEPHONE (OUTPATIENT)
Dept: PRIMARY CARE CLINIC | Facility: CLINIC | Age: 64
End: 2019-08-27

## 2019-08-27 NOTE — TELEPHONE ENCOUNTER
----- Message from Karla Woodruff sent at 8/27/2019  9:35 AM CDT -----  Contact: daughter/keyana/[phone]  Pt daughter called in regards to getting the dr to add some info to  the pt Rx for     Hydrocodone-acetaminophen (NORCO) 5-325 mg per tablet 100 tablet 0 8/26/2019   Take 1 tablet by mouth every 8 (eight) hours as needed.    She was told by the pharmacy that It has to say medically necessary greater than 7 days so the pharmacy can fill it. The daughter  would need to come and  the new Rx that says that.     Please advise

## 2019-08-27 NOTE — TELEPHONE ENCOUNTER
Called pharmacy states that the RX has to states medically necessary for treatment over 7 days. Patients family brought RX back in dr Winkler documented on rx.

## 2019-08-28 ENCOUNTER — OFFICE VISIT (OUTPATIENT)
Dept: RADIATION ONCOLOGY | Facility: CLINIC | Age: 64
End: 2019-08-28
Payer: MEDICARE

## 2019-08-28 ENCOUNTER — DOCUMENTATION ONLY (OUTPATIENT)
Dept: RADIATION ONCOLOGY | Facility: CLINIC | Age: 64
End: 2019-08-28

## 2019-08-28 VITALS
TEMPERATURE: 98 F | BODY MASS INDEX: 22.63 KG/M2 | HEART RATE: 114 BPM | OXYGEN SATURATION: 96 % | WEIGHT: 136 LBS | DIASTOLIC BLOOD PRESSURE: 62 MMHG | SYSTOLIC BLOOD PRESSURE: 94 MMHG

## 2019-08-28 DIAGNOSIS — C18.9 MALIGNANT NEOPLASM OF COLON, UNSPECIFIED PART OF COLON: Primary | ICD-10-CM

## 2019-08-28 DIAGNOSIS — C79.31 BRAIN METASTASIS: ICD-10-CM

## 2019-08-28 DIAGNOSIS — C18.0 MALIGNANT NEOPLASM OF CECUM: ICD-10-CM

## 2019-08-28 PROCEDURE — 99205 OFFICE O/P NEW HI 60 MIN: CPT | Mod: S$GLB,,, | Performed by: RADIOLOGY

## 2019-08-28 PROCEDURE — 99205 PR OFFICE/OUTPT VISIT, NEW, LEVL V, 60-74 MIN: ICD-10-PCS | Mod: S$GLB,,, | Performed by: RADIOLOGY

## 2019-08-28 RX ORDER — LORAZEPAM 0.5 MG/1
0.5 TABLET ORAL DAILY
Qty: 2 TABLET | Refills: 0 | Status: SHIPPED | OUTPATIENT
Start: 2019-08-28 | End: 2019-09-27

## 2019-08-28 RX ORDER — METHYLPREDNISOLONE 4 MG/1
TABLET ORAL
Qty: 1 PACKAGE | Refills: 0 | Status: SHIPPED | OUTPATIENT
Start: 2019-08-28 | End: 2019-09-18

## 2019-08-28 NOTE — PROGRESS NOTES
Kayy Espinal  [id number]  1955 8/28/2019  Chaz Gates Md  [address]  [address]  [place] [address]    REASON FOR CONSULTATION: Metastatic moderately differentiated adenocarcinoma of the cecum    TREATMENT GOAL: palliative    HISTORY OF PRESENT ILLNESS:   63F, chronically debilitated secondary to CVA 9 years ago, presented with GI bleed with CT of the abdomen and pelvis demonstrating a large irregular mass at the cecum, 4.9 x 4.8 cm in size with diffuse complex masses throughout the liver largest 6.3 cm in the right lobe. Patient was transfused 3 units of packed red blood cells for hemoglobin of 4. She notes an approximate 100 pound weight loss.    Follow-up colonoscopy revealed a fungating mass in the cecum him a partially circumferential with biopsy returning moderately differentiated adenocarcinoma with low probability of MSI.    She presented with confusion and general decline with CT of the head demonstrating petechiae hemorrhage in the right cerebellum,  follow-up MRI of the brain appreciating a 9 mm enhancing lesion of the right cerebellum concerning for malignancy. An 8 mm pituitary lesion is also appreciated differential including adenoma versus metastasis.    Patient met with Dr. Gates who ordered PET/CT scan and CEA level, read elevated as 51.9. He feels she is a poor candidate for systemic therapy. Patient also met with Dr. Morgan who noted no surgical indication at this time.    Patient is chronically debilitated but does live by herself and takes care of most needs. Her daughters visited frequently to assist. Today she denies headache, vision or hearing changes, speech or cognition changes. Of note she does have baseline dysfunction secondary to CVA with weakness of the right side of the body and occasional altered mentation. She continues to have bright red blood per rectum on stooling as well as abdominal pain. Plan is for PET/CT scan tomorrow. Patient was reluctant to follow  through consultation today but did so at daughter's urging.    Review of Systems   Constitutional: Positive for fatigue and unexpected weight change. Negative for appetite change, chills and fever.   HENT:   Negative for lump/mass, mouth sores, sore throat, trouble swallowing and voice change.    Eyes: Negative for eye problems and icterus.   Respiratory: Negative for cough, hemoptysis and shortness of breath.    Cardiovascular: Negative for chest pain and leg swelling.   Gastrointestinal: Positive for abdominal pain and blood in stool. Negative for constipation, diarrhea, nausea and vomiting.   Genitourinary: Negative for dysuria, frequency, hematuria, nocturia and vaginal bleeding.    Musculoskeletal: Positive for gait problem. Negative for back pain, neck pain and neck stiffness.   Neurological: Positive for extremity weakness, gait problem and numbness. Negative for headaches and seizures.   Hematological: Negative for adenopathy.   Psychiatric/Behavioral: Positive for confusion.     Past Medical History:   Diagnosis Date    Anemia due to GI blood loss 8/21/2019    Anemia, unspecified 8/21/2019    Asthma     Brain metastasis 8/21/2019    Chronic anemia     Diabetes mellitus, type 2     Hypertension     Stroke      Past Surgical History:   Procedure Laterality Date    COLONOSCOPY N/A 8/1/2019    Performed by D[facility] at [facility] (2ND FLR)    ESOPHAGOGASTRODUODENOSCOPY (EGD) N/A 6/12/2019    Per[facility] Wilber Duarte [facility] at [facility] ENDO    TONSILLECTOMY       Social History     Socioeconomic History    Marital status:      Spouse name: Not on file    Number of children: Not on file    Years of education: Not on file    Highest education level: Not on file   Occupational History    Not on file   Social Needs    Financial resource strain: Not on file    Food insecurity:     Worry: Not on file     Inability: Not on file    Transportation needs:     Medical: Not on file      Non-medical: Not on file   Tobacco Use    Smoking status: Never Smoker    Smokeless tobacco: Never Used   Substance and Sexual Activity    Alcohol use: Not Currently    Drug use: Not Currently    Sexual activity: Not Currently   Lifestyle    Physical activity:     Days per week: Not on file     Minutes per session: Not on file    Stress: Not on file   Relationships    Social connections:     Talks on phone: Not on file     Gets together: Not on file     Attends Denominational service: Not on file     Active member of club or organization: Not on file     Attends meetings of clubs or organizations: Not on file     Relationship status: Not on file   Other Topics Concern    Not on file   Social History Narrative    Not on file     Family History   Problem Relation Age of Onset    Diabetes Mother     Hypertension Mother        PRIOR HISTORY OF CHEMOTHERAPY OR RADIOTHERAPY: Please see HPI for patients prior oncologic history.    Medication List with Changes/Refills   New Medications    LORAZEPAM (ATIVAN) 0.5 MG TABLET    Take 1 tablet (0.5 mg total) by mouth once daily. Take 30min prior to radiation planning and before brain radiation treatment day.    METHYLPREDNISOLONE (MEDROL DOSEPACK) 4 MG TABLET    Begin as directed one day prior to brain radiation treatment day and complete dosepack.   Current Medications    ACETAMINOPHEN (TYLENOL) 100 MG/ML SUSPENSION    Take by mouth every 4 (four) hours as needed for Temperature greater than.    AMLODIPINE (NORVASC) 10 MG TABLET    Take 1 tablet (10 mg total) by mouth once daily.    ATORVASTATIN (LIPITOR) 40 MG TABLET    Take 1 tablet (40 mg total) by mouth once daily.    ESOMEPRAZOLE (NEXIUM) 20 MG CAPSULE    Take 1 capsule (20 mg total) by mouth before breakfast.    HYDROCODONE-ACETAMINOPHEN (NORCO) 5-325 MG PER TABLET    Take 1 tablet by mouth every 8 (eight) hours as needed.    LOSARTAN (COZAAR) 50 MG TABLET    Take 1 tablet (50 mg total) by mouth once daily.      Review of patient's allergies indicates:  No Known Allergies    QUALITY OF LIFE: 70%    Vitals:    08/28/19 1331 08/28/19 1332   BP: 94/62    Pulse: (!) 114    Temp: 97.5 °F (36.4 °C)    TempSrc: Oral    SpO2:  96%   Weight: 61.7 kg (136 lb)    PainSc: 10-Worst pain ever      Body mass index is 22.63 kg/m².    PHYSICAL EXAM:   GENERAL: alert; in no apparent distress. Slowed mentation and cognition. Minimal speech  HEAD: normocephalic, atraumatic.  EYES: pupils are equal, round, reactive to light and accommodation. Sclera anicteric. Conjunctiva not injected.   NOSE/THROAT: no nasal erythema or rhinorrhea. Oropharynx pink, without erythema, ulcerations or thrush.   NECK: no cervical motion rigidity; supple with no masses.  CHEST: Patient is speaking comfortably on room air with normal work of breathing without using accessory muscles of respiration.  ABDOMEN: soft, nontender, nondistended.   MUSCULOSKELETAL: no tenderness to palpation along the spine or scapulae. Normal range of motion.  NEUROLOGIC: cranial nerves II-XII intact bilaterally. Weakness in the right upper and lower extremity. In a wheelchair, gait not assessed  SKIN: excess skin c/with prior weight loss    REVIEW OF IMAGING/PATHOLOGY/LABS: Please see HPI. All images reviewed personally by dictating physician.     ASSESSMENT: 63 y.o. female with metastatic moderately differentiated adenocarcinoma of the cecum, poor operative or systemic therapy candidate.  PLAN:  Kayy Kylie will proceed with completion PET/CT staging tomorrow with CT of the chest and pelvis already demonstrating extensive liver disease and large fungating mass at the cecum with MRI of the brain demonstrating a right mid cerebellar hemorrhagic metastasis. Hypoenhancing lesion at the pituitary gland appears consistent with an adenoma on my review but certainly could be a metastatic deposit. Patient is chronically debilitated and certainly not a candidate for aggressive therapy. I  recommend a palliative course of radiotherapy 5 gray ×5 using 3-D conformal techniques to the cecum with the goal of GI bleed cessation so that she does not require further transfusions and hopefully her symptomatic anemia will correct. I will simultaneously plan a radiosurgical treatment to the right cerebellar metastasis and have discussed treatment to this site which I would recommend to 20 gray with her daughters today including the need for rigid immobilization with aqua Plast mask. I will use her previous MRI for treatment planning. I provided a prescription for Ativan 0.5 mg to take prior to treatment planning and mask fitting as well as radiosurgical treatment day to assist with claustrophobia. Also provided a prescription for Medrol Dosepak to begin prior to radiosurgery to prevent swelling and increased intracranial pressure. After our discussion today the patient and her daughters would like to proceed with treatment. She expressed understanding and it is felt that she will be able to complete therapy.    I carefully explained the process of simulation and treatment delivery with weekly physician visits.     We discussed the risks and benefits of the above treatment and have gone over in detail the acute and late toxicities of radiation therapy to the cecum, R cerebellum. The patient expressed  understanding and has signed a consent form which is included in the patients chart.     The patient has our contact information and understands that they are free to contact us at any time with questions or concerns regarding radiation therapy.    DISPOSITION: RTC AFTER FURTHER WORKUP (PET/CT)    I have personally seen and evaluated this patient. Greater than 50% of this time was spent discussing coordination of care and/or counseling.    PHYSICIAN: Cory Singer Jr, MD    Thank you for the opportunity to meet and consult with Kayy Espinal.   Please feel free to contact me to discuss the above recommendation  further.

## 2019-08-28 NOTE — PROGRESS NOTES
Kayy Espinal  [id number]  1955 8/28/2019  No referring provider defined for this encounter.    DIAGNOSIS:  Metastatic moderately differentiated adenocarcinoma of the cecum  TREATMENT SITE(S): cecum    INTENT: PALLIATIVE    TREATMENT SETTING: RT ALONE     MODALITY: PHOTON    TECHNIQUE:  3D CONFORMAL RADIOTHERAPY (3DCRT) with DIODES    IMRT MEDICAL NECESSITY: N/A    I have personally performed treatment planning for the patient, reviewing relevant history/physical and imaging. I have defined GTV, CTV, PTV and organs at risk.     In order to accomplish this plan, I am ordering:  SIMULATION: CT SIM FOR PLACEMENT OF TREATMENT FIELDS    CONTRAST: none    TO ACCOMPLISH REPRODUCIBLE POSITION: [employer]    DEVICES FOR BEAM SHAPING: CUSTOMIZED MLC    CUSTOMIZED BOLUS: none    IMAGING: DAILY KV/KV OBI    I have ordered a weekly physics check.    SPECIAL PHYSICS CONSULT: NO  REASON: N/A    SPECIAL TREATMENT CIRCUMSTANCE: NO  Concurrent or recent administration of chemotherapeutic agents which are known potent radiosensitizers and thus will require vigilant monitoring for exaggerated radiation toxicities.    LABS: NONE    ANTICIPATED PRESCRIPTION TO ISOCENTER: 25Gy/5frx    ENERGY: 6/23X    TREATMENT: DAILY    PHYSICIAN: MD Kayy Harvey Jr  [id number]  1955 8/28/2019  No referring provider defined for this encounter.    DIAGNOSIS: Metastatic moderately differentiated adenocarcinoma of the cecum   TREATMENT SITE(S): right cerebellum    INTENT: PALLIATIVE    TREATMENT SETTING: RT ALONE     MODALITY: PHOTON    TECHNIQUE:  STEREOTACTIC RADIOSURGERY (SRS)    IMRT MEDICAL NECESSITY: Target volume irregular shape/proximate to critical structures, Narrow margins needed to protect adjacent structures, High precision necessary, Conventional planning maneuvers insufficient, Concave target volume with critical tissues in concavity and Dose escalation exceeds conventional treatment planning    I have personally  performed treatment planning for the patient, reviewing relevant history/physical and imaging. I have defined GTV, CTV, PTV and organs at risk.     In order to accomplish this plan, I am ordering:  SIMULATION: CT SIM + STEALTH PROTOCOL MRI (SRS)    CONTRAST: none    TO ACCOMPLISH REPRODUCIBLE POSITION: AQUAPLAST MASK and ACCUFORM HEADREST (SRS)    DEVICES FOR BEAM SHAPING: CUSTOMIZED MLC    CUSTOMIZED BOLUS: none    IMAGING: CBCT DAILY    I have ordered a weekly physics check.    SPECIAL PHYSICS CONSULT: YES  REASON: SRS    SPECIAL TREATMENT CIRCUMSTANCE: NO  Concurrent or recent administration of chemotherapeutic agents which are known potent radiosensitizers and thus will require vigilant monitoring for exaggerated radiation toxicities.    LABS: NONE    ANTICIPATED PRESCRIPTION TO ISOCENTER: 18Gy/1frx    ENERGY: 6X    TREATMENT: once    PHYSICIAN: Cory Singer Jr, MD

## 2019-08-29 ENCOUNTER — HOSPITAL ENCOUNTER (OUTPATIENT)
Dept: RADIOLOGY | Facility: HOSPITAL | Age: 64
Discharge: HOME | End: 2019-08-29
Attending: INTERNAL MEDICINE
Payer: MEDICARE

## 2019-08-29 VITALS — HEIGHT: 66 IN | WEIGHT: 136 LBS | BODY MASS INDEX: 21.86 KG/M2

## 2019-08-29 DIAGNOSIS — D49.0 CECAL NEOPLASM: ICD-10-CM

## 2019-08-29 DIAGNOSIS — C18.9 MALIGNANT NEOPLASM OF COLON, UNSPECIFIED PART OF COLON: ICD-10-CM

## 2019-08-29 DIAGNOSIS — K76.9 LIVER LESION: ICD-10-CM

## 2019-08-29 DIAGNOSIS — C18.0 MALIGNANT NEOPLASM OF CECUM: ICD-10-CM

## 2019-08-29 LAB — GLUCOSE SERPL-MCNC: 75 MG/DL (ref 70–110)

## 2019-08-29 PROCEDURE — A9552 F18 FDG: HCPCS | Mod: PO

## 2019-08-29 PROCEDURE — 78815 PET IMAGE W/CT SKULL-THIGH: CPT | Mod: TC,PO

## 2019-09-04 ENCOUNTER — CLINICAL SUPPORT (OUTPATIENT)
Dept: PRIMARY CARE CLINIC | Facility: CLINIC | Age: 64
End: 2019-09-04
Payer: MEDICARE

## 2019-09-04 DIAGNOSIS — I10 ESSENTIAL HYPERTENSION: ICD-10-CM

## 2019-09-04 LAB
ACANTHOCYTES BLD QL SMEAR: PRESENT
ALBUMIN SERPL BCP-MCNC: 2.1 G/DL (ref 3.5–5.2)
ALP SERPL-CCNC: 219 U/L (ref 38–126)
ALT SERPL W/O P-5'-P-CCNC: 32 U/L (ref 14–54)
ANION GAP SERPL CALC-SCNC: 15 MMOL/L (ref 8–16)
ANISOCYTOSIS BLD QL SMEAR: ABNORMAL
AST SERPL-CCNC: 195 U/L (ref 15–41)
BASOPHILS # BLD AUTO: 0.1 K/UL (ref 0–0.2)
BASOPHILS NFR BLD: 0.3 % (ref 0–1.9)
BILIRUB SERPL-MCNC: 1.1 MG/DL (ref 0.3–1.2)
BUN SERPL-MCNC: 34 MG/DL (ref 8–23)
CALCIUM SERPL-MCNC: 8.5 MG/DL (ref 8.6–10)
CHLORIDE SERPL-SCNC: 90 MMOL/L (ref 101–111)
CHOLEST SERPL-MCNC: 246 MG/DL (ref 80–200)
CHOLEST/HDLC SERPL: 3.2 {RATIO} (ref 2–5)
CO2 SERPL-SCNC: 29 MMOL/L (ref 23–29)
CREAT SERPL-MCNC: 1.4 MG/DL (ref 0.5–1.4)
DIFFERENTIAL METHOD: ABNORMAL
EOSINOPHIL # BLD AUTO: 0 K/UL (ref 0–0.5)
EOSINOPHIL NFR BLD: 0.1 % (ref 0–8)
ERYTHROCYTE [DISTWIDTH] IN BLOOD BY AUTOMATED COUNT: 24.9 % (ref 11.5–14.5)
EST. GFR  (AFRICAN AMERICAN): 45.5 ML/MIN/1.73 M^2
EST. GFR  (NON AFRICAN AMERICAN): 39.5 ML/MIN/1.73 M^2
GLUCOSE SERPL-MCNC: 91 MG/DL (ref 74–118)
HCT VFR BLD AUTO: 30.5 % (ref 37–48.5)
HDLC SERPL-MCNC: 77 MG/DL (ref 40–75)
HDLC SERPL: 31.3 % (ref 20–50)
HGB BLD-MCNC: 9.7 G/DL (ref 12–16)
LDLC SERPL CALC-MCNC: 128 MG/DL
LYMPHOCYTES # BLD AUTO: 1.4 K/UL (ref 1–4.8)
LYMPHOCYTES NFR BLD: 7.6 % (ref 18–48)
MCH RBC QN AUTO: 23.3 PG (ref 27–31)
MCHC RBC AUTO-ENTMCNC: 32 G/DL (ref 32–36)
MCV RBC AUTO: 73 FL (ref 82–98)
MONOCYTES # BLD AUTO: 1.7 K/UL (ref 0.3–1)
MONOCYTES NFR BLD: 9.6 % (ref 4–15)
NEUTROPHILS # BLD AUTO: 15 K/UL (ref 1.8–7.7)
NEUTROPHILS NFR BLD: 82.4 % (ref 38–73)
NONHDLC SERPL-MCNC: 169 MG/DL
OVALOCYTES BLD QL SMEAR: ABNORMAL
PLATELET # BLD AUTO: 607 K/UL (ref 150–350)
PMV BLD AUTO: 8.3 FL (ref 9.2–12.9)
POIKILOCYTOSIS BLD QL SMEAR: SLIGHT
POTASSIUM SERPL-SCNC: 3.8 MMOL/L (ref 3.5–5.1)
PROT SERPL-MCNC: 8 G/DL (ref 6–8.4)
RBC # BLD AUTO: 4.19 M/UL (ref 4–5.4)
SCHISTOCYTES BLD QL SMEAR: PRESENT
SODIUM SERPL-SCNC: 134 MMOL/L (ref 136–145)
T4 FREE SERPL-MCNC: 1.23 NG/DL (ref 0.61–1.12)
TRIGL SERPL-MCNC: 205 MG/DL (ref 30–150)
TSH SERPL DL<=0.005 MIU/L-ACNC: 2.14 UIU/ML (ref 0.45–5.33)
WBC # BLD AUTO: 18.2 K/UL (ref 3.9–12.7)

## 2019-09-04 PROCEDURE — 85025 COMPLETE CBC W/AUTO DIFF WBC: CPT

## 2019-09-04 PROCEDURE — 80053 COMPREHEN METABOLIC PANEL: CPT

## 2019-09-04 PROCEDURE — 36415 COLL VENOUS BLD VENIPUNCTURE: CPT | Mod: PBBFAC,PN

## 2019-09-04 PROCEDURE — 80061 LIPID PANEL: CPT

## 2019-09-04 PROCEDURE — 84443 ASSAY THYROID STIM HORMONE: CPT

## 2019-09-04 PROCEDURE — 84439 ASSAY OF FREE THYROXINE: CPT

## 2019-09-05 DIAGNOSIS — Z12.39 BREAST CANCER SCREENING: ICD-10-CM

## 2019-09-09 ENCOUNTER — TELEPHONE (OUTPATIENT)
Dept: PRIMARY CARE CLINIC | Facility: CLINIC | Age: 64
End: 2019-09-09

## 2019-09-09 ENCOUNTER — DOCUMENTATION ONLY (OUTPATIENT)
Dept: RADIATION ONCOLOGY | Facility: CLINIC | Age: 64
End: 2019-09-09

## 2019-09-09 NOTE — PROGRESS NOTES
Kayy Espinal  [id number]  1955 9/9/2019  No referring provider defined for this encounter.    DIAGNOSIS: Metastatic moderately differentiated adenocarcinoma of the cecum/single brain metastasis    TODAY'S DOSE (cGy): 1800  cGy    SITE:  Right cerebellar metastasis    TITLE OF PROCEDURE: STEREOTACTIC RADIOSURGERY    PROCEDURE IN DETAIL:  Patient arrived as an outpatient to the [facility] Radiation Oncology department and was escorted to the [facility] vault and placed on the treatment couch and immobilized in customized aquaplast mask with accuform headrest. Once found to be in good alignment with in-room GPS positioning system, cone beam CT was completed in axial, coronal and sagittal planes. I personally confirmed agreement with planning CT scan, then treatment was delivered using 6MV photons prescribed to isocenter using an IMRT algorithm. At completion of treatment, patient developed no ill sequelae and was released from immobilization and given instructions for next fraction/follow-up.    PHYSICIAN: Joel Cook MD

## 2019-09-09 NOTE — TELEPHONE ENCOUNTER
----- Message from Beth Jaimes sent at 9/9/2019  9:32 AM CDT -----  Contact: Melissa with [facility] phone [phone]  Melissa with [facility] phone [phone], Calling to ask if Dr Winkler will follow patient with [place] [facility]. Please call her. Thanks.

## 2019-09-10 NOTE — PROGRESS NOTES
[facility] Hematology/Oncology  PROGRESS NOTE - 2nd Follow-up Visit      Subjective:       Patient ID:   NAME: Kayy Espinal : 1955     63 y.o. female    Referring Doc: Cathi  Other Physicians: Misael Duarte ([facility] [place]), Lucrecia Morgan    Chief Complaint: met cecal ca f/u    History of Present Illness:     Patient returns today for a 2nd regularly scheduled follow-up visit.  The patient is here today to go over the results of the recently ordered labs, tests and studies. She has been seen by Dr Singer with rad/onc and Dr Morgan with Gen surgery. Head scans unfortunately showed brain lesions as well. She has been on XRT per direction of Dr Singer. She had SBRT to the brain and is now on XRT to the colon. She has some right shoulder issues. She is here with her three daughters. She is having some N and V. She is otherwise tolerating XRT ok. She is still having bowel movements.             ROS:   GEN: normal without any fever, night sweats or weight loss  HEENT: normal with no HA's, sore throat, stiff neck, changes in vision  CV: normal with no CP, SOB, PND, JACOBSEN or orthopnea  PULM: normal with no SOB, cough, hemoptysis, sputum or pleuritic pain  GI: normal with no abdominal pain, nausea, vomiting, constipation, diarrhea, melanotic stools, BRBPR, or hematemesis  : normal with no hematuria, dysuria  BREAST: normal with no mass, discharge, pain  SKIN: normal with no rash, erythema, bruising, or swelling    Allergies:  Review of patient's allergies indicates:  No Known Allergies    Medications:    Current Outpatient Medications:     acetaminophen (TYLENOL) 100 mg/mL suspension, Take by mouth every 4 (four) hours as needed for Temperature greater than., Disp: , Rfl:     amLODIPine (NORVASC) 10 MG tablet, Take 1 tablet (10 mg total) by mouth once daily., Disp: 30 tablet, Rfl: 11    atorvastatin (LIPITOR) 40 MG tablet, Take 1 tablet (40 mg total) by mouth once daily., Disp: 90 tablet, Rfl: 0    esomeprazole  (NEXIUM) 20 MG capsule, Take 1 capsule (20 mg total) by mouth before breakfast., Disp: 30 capsule, Rfl: 11    HYDROcodone-acetaminophen (NORCO) 5-325 mg per tablet, Take 1 tablet by mouth every 8 (eight) hours as needed., Disp: 100 tablet, Rfl: 0    LORazepam (ATIVAN) 0.5 MG tablet, Take 1 tablet (0.5 mg total) by mouth once daily. Take 30min prior to radiation planning and before brain radiation treatment day., Disp: 2 tablet, Rfl: 0    losartan (COZAAR) 50 MG tablet, Take 1 tablet (50 mg total) by mouth once daily., Disp: 90 tablet, Rfl: 0    methylPREDNISolone (MEDROL DOSEPACK) 4 mg tablet, Begin as directed one day prior to brain radiation treatment day and complete dosepack., Disp: 1 Package, Rfl: 0    PMHx/PSHx Updates:  See patient's last visit with me on 8/21/2019.  See H&P on 8/21/2019        Pathology:  Cancer Staging  No matching staging information was found for the patient.    Colonoscopy with biopsies  8/1/2019:     FINAL PATHOLOGIC DIAGNOSIS  1. Cecal mass, biopsy: Moderately differentiated adenocarcinoma, see comment  Comment: The specimen shows infiltrating malignant glands with surface mucosal ulceration. Definitive  lymphovascular invasion and perineural invasion are NOT identified     Supplemental Diagnosis  THE RESULTS OF MMR IMMUNOSTAINS ARE AS FOLLOWS:  IMMUNOSTAINS FOR PMS2, MSH6, MSH2 AND MLH1 ALL SHOW INTACT NUCLEAR EXPRESSION. ALL THE  CONTROLS STAIN APPROPRIATELY.  NOTE: WITH INTACT NUCLEAR EXPRESSION THE PROBABILITY OF MICROSATELLITE INSTABILITY IS  LOW. WE ADVISE CORRELATION WITH CLINICAL FINDINGS.         Objective:     Vitals:  Blood pressure 115/78, pulse (!) 123, temperature 97.9 °F (36.6 °C), temperature source Oral, resp. rate 20, weight 61.7 kg (136 lb).    Physical Examination:   GEN: no apparent distress, comfortable; AAOx3; chronically debilitated  HEAD: atraumatic and normocephalic  EYES: no pallor, no icterus, PERRLA  ENT: OMM, no pharyngeal erythema, external ears WNL;  no nasal discharge; no thrush  NECK: no masses, thyroid normal, trachea midline, no LAD/LN's, supple  CV: RRR with no murmur; normal pulse; normal S1 and S2; no pedal edema  CHEST: Normal respiratory effort; CTAB; normal breath sounds; no wheeze or crackles  ABDOM: nontender and nondistended; soft; normal bowel sounds; no rebound/guarding  MUSC/Skeletal: wheelchair; right shoulder issues (displacement)  EXTREM: no clubbing, cyanosis, inflammation or swelling  SKIN: no rashes, lesions, ulcers, petechiae or subcutaneous nodules  : no teran  NEURO: right sided weakness; wheelchair; no tremors; alert  PSYCH: normal mood, affect and behavior  LYMPH: normal cervical, supraclavicular, axillary and groin LN's             Labs:   9/4/2019  Lab Results   Component Value Date    WBC 18.20 (H) 09/04/2019    HGB 9.7 (L) 09/04/2019    HCT 30.5 (L) 09/04/2019    MCV 73 (L) 09/04/2019     (H) 09/04/2019     CMP  Sodium   Date Value Ref Range Status   09/04/2019 134 (L) 136 - 145 mmol/L Final     Potassium   Date Value Ref Range Status   09/04/2019 3.8 3.5 - 5.1 mmol/L Final     Chloride   Date Value Ref Range Status   09/04/2019 90 (L) 101 - 111 mmol/L Final     CO2   Date Value Ref Range Status   09/04/2019 29 23 - 29 mmol/L Final     Glucose   Date Value Ref Range Status   09/04/2019 91 74 - 118 mg/dL Final     BUN, Bld   Date Value Ref Range Status   09/04/2019 34 (H) 8 - 23 mg/dL Final     Creatinine   Date Value Ref Range Status   09/04/2019 1.4 0.5 - 1.4 mg/dL Final     Calcium   Date Value Ref Range Status   09/04/2019 8.5 (L) 8.6 - 10.0 mg/dL Final     Total Protein   Date Value Ref Range Status   09/04/2019 8.0 6.0 - 8.4 g/dL Final     Albumin   Date Value Ref Range Status   09/04/2019 2.1 (L) 3.5 - 5.2 g/dL Final     Total Bilirubin   Date Value Ref Range Status   09/04/2019 1.1 0.3 - 1.2 mg/dL Final     Comment:     For infants and newborns, interpretation of results should be based  on gestational age, weight  and in agreement with clinical  observations.  Premature Infant recommended reference ranges:  Up to 24 hours.............<8.0 mg/dL  Up to 48 hours............<12.0 mg/dL  3-5 days..................<15.0 mg/dL  6-29 days.................<15.0 mg/dL       Alkaline Phosphatase   Date Value Ref Range Status   09/04/2019 219 (H) 38 - 126 U/L Final     AST   Date Value Ref Range Status   09/04/2019 195 (H) 15 - 41 U/L Final     ALT   Date Value Ref Range Status   09/04/2019 32 14 - 54 U/L Final     Anion Gap   Date Value Ref Range Status   09/04/2019 15 8 - 16 mmol/L Final     eGFR if    Date Value Ref Range Status   09/04/2019 45.5 (A) >60 mL/min/1.73 m^2 Final     eGFR if non    Date Value Ref Range Status   09/04/2019 39.5 (A) >60 mL/min/1.73 m^2 Final     Comment:     Calculation used to obtain the estimated glomerular filtration  rate (eGFR) is the CKD-EPI equation.          CEA 0.0 - 5.0 ng/mL 51.9           Radiology/Diagnostic Studies:    Nm Pet Ct Routine Skull To Mid Thigh    Result Date: 8/29/2019  EXAMINATION: NM PET CT ROUTINE CLINICAL HISTORY: stage colon ca; Malignant neoplasm of cecum TECHNIQUE: Following IV administration of 10.9 mCi of F-18 labeled FDG into right antecubital fossa and a 60 minute delay, PET CT was performed from the vertex of the skull through the proximal thighs with an integrated PET CT scanner with image fusion. CT images were obtained to aid in attenuation correction and PET localization. The patient's serum glucose at the time of the exam was 75 mg/dL. COMPARISON: CT abdomen and pelvis with contrast 07/28/2019 FINDINGS: Images through the brain demonstrate areas of white matter hypoattenuation involving the left frontal lobe and left basal ganglia.  Visualized paranasal sinuses and mastoid air cells are clear. No pathologically enlarged or hypermetabolic lymph nodes within the neck. 2 mm right upper lobe pulmonary nodule on image 97.  4 mm left  upper lobe pulmonary nodule on image 104.  3 mm left upper lobe pulmonary nodule on image 94.  These nodules are too small for accurate PET characterization.  No confluent airspace disease.  No pleural effusion or pneumothorax.  No hypermetabolic or pathologically enlarged mediastinal, axillary, or hilar lymph nodes.  Atherosclerotic calcification of the aorta. There are large, heterogeneous attenuation masses involving both hepatic lobes, similar in size to the reference examination.  Right hepatic lobe lesion measures 11.6 cm, with SUV max of 19.1.  Left hepatic lobe lesion measures 9.1 cm, with SUV max of 15.2.  These lesions, as well as other hepatic lesions are markedly hypermetabolic.  The right hepatic lobe lesion demonstrates central hypometabolism, suggestive of intralesional necrosis.  Cholelithiasis.  No intra or extrahepatic bile duct dilation.  Spleen appears normal.  Small left upper quadrant splenule.  Pancreas is unremarkable.  Adrenal glands appear normal. Cecal mass discussed on the reference CT is markedly hypermetabolic with SUV max 23.8.  There are enlarged, hypermetabolic pericecal lymph nodes.  The most FDG avid lymph node demonstrates SUV max of 6.0 on image 194.  Small volume free fluid within the pelvis.  There is also hypermetabolism within the terminal ileum, with mild ileal wall thickening, and local spread of malignancy is possible. Bone window images demonstrate degenerative changes of the spine.  No acute or aggressive osseous abnormality.     Large, hypermetabolic cecal mass consistent with primary colonic malignancy. Numerous large hypermetabolic liver masses consistent with hepatic metastasis. Enlarged, hypermetabolic pericecal lymph nodes, consistent with metastatic disease. Hypermetabolism within the terminal, with mild wall thickening.  Local invasion of colonic the malignancy is possible. Subcentimeter a pulmonary nodules within both lungs, too small for accurate PET  characterization.  Metastasis is possible and attention on follow-up is warranted. Small volume free fluid within the pelvis. Cholelithiasis. Electronically signed by: Gautam Maradiaga MD Date:    08/29/2019 Time:    16:05      MRI head 7/29/2019:    Impression       Small enhancing lesion within the cerebellum on the right corresponding to abnormality on prior CT head.  Given the findings on prior CT of the abdomen, this is concerning for a metastatic lesion.  Minimal surrounding edema without significant mass effect.    Extensive chronic microvascular ischemic changes with remote left corona radiata infarct and findings suggestive of chronic hypertensive encephalopathy with multiple remote microhemorrhages.    0.8 cm lesion within the pituitary gland on the left.  Statistically this likely represents a pituitary microadenoma.  Given the concern for metastatic disease, metastatic lesion would be in the differential but is felt less likely.             I have reviewed all available lab results and radiology reports.    Assessment/Plan:   (1) 63 y.o. female  with recent diagnosis of cecal mass with brain and liver lesions worrisome for a metastatic process who has been referred by Dr Winkler for evaluation by medical oncology.     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 Add megace for appetite  2. Add zofran and phenergan prn N/V  3. Refer to ortho about the right shoulder  4. Continue with the XRt and f/u with rad/onc  RTC in 2 weeks  Fax note to , Jaime Winkler MD, Felicia Singer    Discussion:       I spent over 25 mins of time with the patient. Reviewed results of the recently ordered labs, tests and studies; made directives with regards to the results. Over half of this time was spent couseling and coordinating care.    I have explained all of the above in detail and the patient understands all of the current recommendation(s). I have answered all of their questions to the best of my ability and to their complete satisfaction.   The patient is to continue with the current management plan.            Electronically signed by Chaz Gates MD

## 2019-09-11 ENCOUNTER — EXTERNAL HOME HEALTH (OUTPATIENT)
Dept: HOME HEALTH SERVICES | Facility: HOSPITAL | Age: 64
End: 2019-09-11
Payer: MEDICARE

## 2019-09-11 ENCOUNTER — OFFICE VISIT (OUTPATIENT)
Dept: HEMATOLOGY/ONCOLOGY | Facility: CLINIC | Age: 64
End: 2019-09-11
Payer: MEDICARE

## 2019-09-11 ENCOUNTER — TELEPHONE (OUTPATIENT)
Dept: CARDIOLOGY | Facility: CLINIC | Age: 64
End: 2019-09-11

## 2019-09-11 VITALS
BODY MASS INDEX: 21.95 KG/M2 | SYSTOLIC BLOOD PRESSURE: 115 MMHG | TEMPERATURE: 98 F | HEART RATE: 123 BPM | RESPIRATION RATE: 20 BRPM | WEIGHT: 136 LBS | DIASTOLIC BLOOD PRESSURE: 78 MMHG

## 2019-09-11 DIAGNOSIS — D49.0 CECAL NEOPLASM: ICD-10-CM

## 2019-09-11 DIAGNOSIS — C18.0 MALIGNANT NEOPLASM OF CECUM: ICD-10-CM

## 2019-09-11 DIAGNOSIS — D50.0 ANEMIA DUE TO GI BLOOD LOSS: Primary | ICD-10-CM

## 2019-09-11 DIAGNOSIS — D64.9 ANEMIA, UNSPECIFIED TYPE: ICD-10-CM

## 2019-09-11 DIAGNOSIS — S49.91XA INJURY OF RIGHT SHOULDER, INITIAL ENCOUNTER: ICD-10-CM

## 2019-09-11 DIAGNOSIS — C18.9 MALIGNANT NEOPLASM OF COLON, UNSPECIFIED PART OF COLON: ICD-10-CM

## 2019-09-11 DIAGNOSIS — C79.31 BRAIN METASTASIS: ICD-10-CM

## 2019-09-11 DIAGNOSIS — K76.9 LIVER LESION: ICD-10-CM

## 2019-09-11 PROCEDURE — 99215 PR OFFICE/OUTPT VISIT, EST, LEVL V, 40-54 MIN: ICD-10-PCS | Mod: S$GLB,,, | Performed by: INTERNAL MEDICINE

## 2019-09-11 PROCEDURE — 99215 OFFICE O/P EST HI 40 MIN: CPT | Mod: S$GLB,,, | Performed by: INTERNAL MEDICINE

## 2019-09-11 RX ORDER — MEGESTROL ACETATE 40 MG/ML
400 SUSPENSION ORAL DAILY
Qty: 240 ML | Refills: 2 | Status: SHIPPED | OUTPATIENT
Start: 2019-09-11 | End: 2020-09-10

## 2019-09-11 RX ORDER — ONDANSETRON HYDROCHLORIDE 8 MG/1
8 TABLET, FILM COATED ORAL EVERY 12 HOURS PRN
Qty: 30 TABLET | Refills: 2 | Status: SHIPPED | OUTPATIENT
Start: 2019-09-11 | End: 2020-09-10

## 2019-09-11 RX ORDER — PROMETHAZINE HYDROCHLORIDE 12.5 MG/1
12.5 TABLET ORAL EVERY 6 HOURS PRN
Qty: 30 TABLET | Refills: 3 | Status: SHIPPED | OUTPATIENT
Start: 2019-09-11

## 2019-09-11 NOTE — TELEPHONE ENCOUNTER
----- Message from Shereen Dinh sent at 9/11/2019  1:54 PM CDT -----  Contact: [phone]/Patient  Patient is requesting a callback regarding her referral.    Patient would like to schedule if possible.    Please call.    Phone [phone]

## 2019-09-11 NOTE — TELEPHONE ENCOUNTER
Returned patient's call to offer our next available appointment which is 12/05/19 at 9:15. No answer; LVM

## 2019-09-12 DIAGNOSIS — C18.9 MALIGNANT NEOPLASM OF COLON, UNSPECIFIED PART OF COLON: Primary | ICD-10-CM

## 2019-09-12 RX ORDER — LEVOFLOXACIN 500 MG/1
500 TABLET, FILM COATED ORAL DAILY
Qty: 10 TABLET | Refills: 0 | Status: SHIPPED | OUTPATIENT
Start: 2019-09-12 | End: 2019-09-22

## 2019-09-12 NOTE — TELEPHONE ENCOUNTER
Call returned.  Patients daughter denies fever as well as any other infection symptom.  RN explained that Dr. Gates said it could very well be the cancer causing the increased lab results.  She is instructed that if her mother develops any signs of infection that she needed to bring her to the ED.  Kale ordered per Dr. Gates.    ----- Message from Ava Cano sent at 9/12/2019 11:17 AM CDT -----  Labs were done on 9/4/19 ordered by dr torres. Labs are in epic. Daughter said the dr in radiation sent a message to dr grayson about the labs. Daughter said they were informed that he numbers were high and could be due to stress or an infection. Daughter just wants a call about this matter and to know if the pt needs to be on antibiotics.     # [phone]

## 2019-09-13 ENCOUNTER — DOCUMENTATION ONLY (OUTPATIENT)
Dept: HEMATOLOGY/ONCOLOGY | Facility: CLINIC | Age: 64
End: 2019-09-13

## 2019-09-13 NOTE — PROGRESS NOTES
NUTRITION NOTE    I met Ms. Sultana and 2 of her daughters after her radiation treatment. Pt's daughter reported that she has not wanted to eat anything recently, including smoothies, nutrition shakes, or any other soft foods. Pt has reportedly continued to lose weight since the start of her treatment. Pt will drink water & small amounts of Gatorade, but does not have an appetite currently. Pt was prescribed megace, which hopefully will help increase her appetite. Provided daughters with suggestions for increasing pt's caloric intake, such as adding protein powder to water for a lighter consistency, which may be more appealing to Ms. Sultana. Also encouraged them to offer pt small sips of nutrition supplements, shakes, or any other high-calorie drinks frequently throughout the day. Provided coupons for nutrition supplements & Ensure electrolyte powder to add to water.   Provided RD contact info. Will continue to follow-up weekly.

## 2019-09-19 ENCOUNTER — TELEPHONE (OUTPATIENT)
Dept: HOME HEALTH SERVICES | Facility: HOSPITAL | Age: 64
End: 2019-09-19

## 2019-09-20 ENCOUNTER — TELEPHONE (OUTPATIENT)
Dept: HOME HEALTH SERVICES | Facility: HOSPITAL | Age: 64
End: 2019-09-20

## 2019-09-23 NOTE — PROGRESS NOTES
[facility] Hematology/Oncology  PROGRESS NOTE -   Follow-up Visit      Subjective:       Patient ID:   NAME: Kayy Espinal : 1955     63 y.o. female    Referring Doc: Cathi  Other Physicians: Misael Duarte ([facility]), Lucrecia Morgan    Chief Complaint: met cecal ca f/u    History of Present Illness:     Patient returns today for a  regularly scheduled follow-up visit.  The patient is here today to go over the results of the recently ordered labs, tests and studies. She has been seen by Dr Singer with rad/onc and Dr Morgan with Gen surgery. Head scans unfortunately showed brain lesions as well. She has been on XRT per direction of Dr Singer. She had previously completed SBRT to the brain and also XRT to the colon. She is here with her two of her three daughters. She has some fatigue and has been having some diarrhea. She has RTC with rad/onc on Oct 10th. She is in wheelchair. Her daughters report that the only thing she does at home is sleep. Pain is under control. She has been taking pediasure and protein drinks but is not eating solids.           ROS:   GEN: normal without any fever, night sweats or weight loss  HEENT: normal with no HA's, sore throat, stiff neck, changes in vision  CV: normal with no CP, SOB, PND, JACOBSEN or orthopnea  PULM: normal with no SOB, cough, hemoptysis, sputum or pleuritic pain  GI: normal with no abdominal pain, nausea, vomiting, constipation, diarrhea, melanotic stools, BRBPR, or hematemesis  : normal with no hematuria, dysuria  BREAST: normal with no mass, discharge, pain  SKIN: normal with no rash, erythema, bruising, or swelling    Allergies:  Review of patient's allergies indicates:  No Known Allergies    Medications:    Current Outpatient Medications:     acetaminophen (TYLENOL) 100 mg/mL suspension, Take by mouth every 4 (four) hours as needed for Temperature greater than., Disp: , Rfl:     amLODIPine (NORVASC) 10 MG tablet, Take 1 tablet (10 mg total) by mouth once  daily., Disp: 30 tablet, Rfl: 11    atorvastatin (LIPITOR) 40 MG tablet, Take 1 tablet (40 mg total) by mouth once daily., Disp: 90 tablet, Rfl: 0    esomeprazole (NEXIUM) 20 MG capsule, Take 1 capsule (20 mg total) by mouth before breakfast., Disp: 30 capsule, Rfl: 11    HYDROcodone-acetaminophen (NORCO) 5-325 mg per tablet, Take 1 tablet by mouth every 8 (eight) hours as needed., Disp: 100 tablet, Rfl: 0    LORazepam (ATIVAN) 0.5 MG tablet, Take 1 tablet (0.5 mg total) by mouth once daily. Take 30min prior to radiation planning and before brain radiation treatment day., Disp: 2 tablet, Rfl: 0    losartan (COZAAR) 50 MG tablet, Take 1 tablet (50 mg total) by mouth once daily., Disp: 90 tablet, Rfl: 0    megestrol (MEGACE) 400 mg/10 mL (40 mg/mL) Susp, Take 10 mLs (400 mg total) by mouth once daily., Disp: 240 mL, Rfl: 2    ondansetron (ZOFRAN) 8 MG tablet, Take 1 tablet (8 mg total) by mouth every 12 (twelve) hours as needed for Nausea., Disp: 30 tablet, Rfl: 2    promethazine (PHENERGAN) 12.5 MG Tab, Take 1 tablet (12.5 mg total) by mouth every 6 (six) hours as needed., Disp: 30 tablet, Rfl: 3    PMHx/PSHx Updates:  See patient's last visit with me on 9/11/2019.  See H&P on 8/21/2019        Pathology:  Cancer Staging  No matching staging information was found for the patient.    Colonoscopy with biopsies  8/1/2019:     FINAL PATHOLOGIC DIAGNOSIS  1. Cecal mass, biopsy: Moderately differentiated adenocarcinoma, see comment  Comment: The specimen shows infiltrating malignant glands with surface mucosal ulceration. Definitive  lymphovascular invasion and perineural invasion are NOT identified     Supplemental Diagnosis  THE RESULTS OF MMR IMMUNOSTAINS ARE AS FOLLOWS:  IMMUNOSTAINS FOR PMS2, MSH6, MSH2 AND MLH1 ALL SHOW INTACT NUCLEAR EXPRESSION. ALL THE  CONTROLS STAIN APPROPRIATELY.  NOTE: WITH INTACT NUCLEAR EXPRESSION THE PROBABILITY OF MICROSATELLITE INSTABILITY IS  LOW. WE ADVISE CORRELATION WITH  CLINICAL FINDINGS.         Objective:     Vitals:  Blood pressure (!) 88/60, pulse 85, temperature 98.3 °F (36.8 °C), temperature source Oral, resp. rate 19.    Physical Examination:   GEN: no apparent distress, comfortable; AAOx3; chronically debilitated  HEAD: atraumatic and normocephalic  EYES: no pallor, no icterus, PERRLA  ENT: OMM, no pharyngeal erythema, external ears WNL; no nasal discharge; no thrush  NECK: no masses, thyroid normal, trachea midline, no LAD/LN's, supple  CV: RRR with no murmur; normal pulse; normal S1 and S2; no pedal edema  CHEST: Normal respiratory effort; CTAB; normal breath sounds; no wheeze or crackles  ABDOM: nontender and nondistended; soft; normal bowel sounds; no rebound/guarding  MUSC/Skeletal: wheelchair; right shoulder issues (displacement)  EXTREM: no clubbing, cyanosis, inflammation or swelling  SKIN: no rashes, lesions, ulcers, petechiae or subcutaneous nodules  : no teran  NEURO: right sided weakness; wheelchair; no tremors; alert  PSYCH: normal mood, affect and behavior  LYMPH: normal cervical, supraclavicular, axillary and groin LN's             Labs:   9/4/2019  Lab Results   Component Value Date    WBC 18.20 (H) 09/04/2019    HGB 9.7 (L) 09/04/2019    HCT 30.5 (L) 09/04/2019    MCV 73 (L) 09/04/2019     (H) 09/04/2019     CMP  Sodium   Date Value Ref Range Status   09/04/2019 134 (L) 136 - 145 mmol/L Final     Potassium   Date Value Ref Range Status   09/04/2019 3.8 3.5 - 5.1 mmol/L Final     Chloride   Date Value Ref Range Status   09/04/2019 90 (L) 101 - 111 mmol/L Final     CO2   Date Value Ref Range Status   09/04/2019 29 23 - 29 mmol/L Final     Glucose   Date Value Ref Range Status   09/04/2019 91 74 - 118 mg/dL Final     BUN, Bld   Date Value Ref Range Status   09/04/2019 34 (H) 8 - 23 mg/dL Final     Creatinine   Date Value Ref Range Status   09/04/2019 1.4 0.5 - 1.4 mg/dL Final     Calcium   Date Value Ref Range Status   09/04/2019 8.5 (L) 8.6 - 10.0  mg/dL Final     Total Protein   Date Value Ref Range Status   09/04/2019 8.0 6.0 - 8.4 g/dL Final     Albumin   Date Value Ref Range Status   09/04/2019 2.1 (L) 3.5 - 5.2 g/dL Final     Total Bilirubin   Date Value Ref Range Status   09/04/2019 1.1 0.3 - 1.2 mg/dL Final     Comment:     For infants and newborns, interpretation of results should be based  on gestational age, weight and in agreement with clinical  observations.  Premature Infant recommended reference ranges:  Up to 24 hours.............<8.0 mg/dL  Up to 48 hours............<12.0 mg/dL  3-5 days..................<15.0 mg/dL  6-29 days.................<15.0 mg/dL       Alkaline Phosphatase   Date Value Ref Range Status   09/04/2019 219 (H) 38 - 126 U/L Final     AST   Date Value Ref Range Status   09/04/2019 195 (H) 15 - 41 U/L Final     ALT   Date Value Ref Range Status   09/04/2019 32 14 - 54 U/L Final     Anion Gap   Date Value Ref Range Status   09/04/2019 15 8 - 16 mmol/L Final     eGFR if    Date Value Ref Range Status   09/04/2019 45.5 (A) >60 mL/min/1.73 m^2 Final     eGFR if non    Date Value Ref Range Status   09/04/2019 39.5 (A) >60 mL/min/1.73 m^2 Final     Comment:     Calculation used to obtain the estimated glomerular filtration  rate (eGFR) is the CKD-EPI equation.          CEA 0.0 - 5.0 ng/mL 51.9           Radiology/Diagnostic Studies:    Nm Pet Ct Routine Skull To Mid Thigh    Result Date: 8/29/2019  EXAMINATION: NM PET CT ROUTINE CLINICAL HISTORY: stage colon ca; Malignant neoplasm of cecum TECHNIQUE: Following IV administration of 10.9 mCi of F-18 labeled FDG into right antecubital fossa and a 60 minute delay, PET CT was performed from the vertex of the skull through the proximal thighs with an integrated PET CT scanner with image fusion. CT images were obtained to aid in attenuation correction and PET localization. The patient's serum glucose at the time of the exam was 75 mg/dL. COMPARISON: CT  abdomen and pelvis with contrast 07/28/2019 FINDINGS: Images through the brain demonstrate areas of white matter hypoattenuation involving the left frontal lobe and left basal ganglia.  Visualized paranasal sinuses and mastoid air cells are clear. No pathologically enlarged or hypermetabolic lymph nodes within the neck. 2 mm right upper lobe pulmonary nodule on image 97.  4 mm left upper lobe pulmonary nodule on image 104.  3 mm left upper lobe pulmonary nodule on image 94.  These nodules are too small for accurate PET characterization.  No confluent airspace disease.  No pleural effusion or pneumothorax.  No hypermetabolic or pathologically enlarged mediastinal, axillary, or hilar lymph nodes.  Atherosclerotic calcification of the aorta. There are large, heterogeneous attenuation masses involving both hepatic lobes, similar in size to the reference examination.  Right hepatic lobe lesion measures 11.6 cm, with SUV max of 19.1.  Left hepatic lobe lesion measures 9.1 cm, with SUV max of 15.2.  These lesions, as well as other hepatic lesions are markedly hypermetabolic.  The right hepatic lobe lesion demonstrates central hypometabolism, suggestive of intralesional necrosis.  Cholelithiasis.  No intra or extrahepatic bile duct dilation.  Spleen appears normal.  Small left upper quadrant splenule.  Pancreas is unremarkable.  Adrenal glands appear normal. Cecal mass discussed on the reference CT is markedly hypermetabolic with SUV max 23.8.  There are enlarged, hypermetabolic pericecal lymph nodes.  The most FDG avid lymph node demonstrates SUV max of 6.0 on image 194.  Small volume free fluid within the pelvis.  There is also hypermetabolism within the terminal ileum, with mild ileal wall thickening, and local spread of malignancy is possible. Bone window images demonstrate degenerative changes of the spine.  No acute or aggressive osseous abnormality.     Large, hypermetabolic cecal mass consistent with primary colonic  malignancy. Numerous large hypermetabolic liver masses consistent with hepatic metastasis. Enlarged, hypermetabolic pericecal lymph nodes, consistent with metastatic disease. Hypermetabolism within the terminal, with mild wall thickening.  Local invasion of colonic the malignancy is possible. Subcentimeter a pulmonary nodules within both lungs, too small for accurate PET characterization.  Metastasis is possible and attention on follow-up is warranted. Small volume free fluid within the pelvis. Cholelithiasis. Electronically signed by: Gautam Maradiaga MD Date:    08/29/2019 Time:    16:05      MRI head 7/29/2019:    Impression       Small enhancing lesion within the cerebellum on the right corresponding to abnormality on prior CT head.  Given the findings on prior CT of the abdomen, this is concerning for a metastatic lesion.  Minimal surrounding edema without significant mass effect.    Extensive chronic microvascular ischemic changes with remote left corona radiata infarct and findings suggestive of chronic hypertensive encephalopathy with multiple remote microhemorrhages.    0.8 cm lesion within the pituitary gland on the left.  Statistically this likely represents a pituitary microadenoma.  Given the concern for metastatic disease, metastatic lesion would be in the differential but is felt less likely.             I have reviewed all available lab results and radiology reports.    Assessment/Plan:   (1) 63 y.o. female  with recent diagnosis of cecal mass with brain and liver lesions worrisome for a metastatic process who has been referred by Dr Winkler for evaluation by medical oncology.     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 9 years  - she had recent right cerebellar petechial hemorrhage   - she is wheelchair bound and chronically debilitated since recent bleed  - she is no longer getting PT        (3) Hx/of metabolic encephalopathy     (4) Pituitary lesion     (5) Chronic anemia with microcytic indices - latest hgb was 9.1     (6) HTN and hypercholesterolemia     (7) DM    (8) right shoulder is now better but she did not go see ortho as directed    VISIT DIAGNOSES:      Malignant neoplasm of cecum     Malignant neoplasm of colon, unspecified part of colon    Cecal neoplasm    Brain metastasis    Anemia, unspecified type    Anemia due to GI blood loss    Liver lesion          PLAN:  1. continue megace for appetite  2. continue zofran and phenergan prn  For N or V  3. In her current chronic debilitated state, she is not a candidate for any chemotherapy therapy  4.  F/u with rad/onc on Oct 10th and will eventually need f/u PEt scan  5. If her condition declines, then consider hospice  RTC in 4 weeks  Fax note to , Jaime Winkler MD, Felicia Singer    Discussion:       I spent over 25 mins of time with the patient. Reviewed results of the recently ordered labs, tests and studies; made directives with regards to the results. Over half of this time was spent couseling and coordinating care.    I have explained all of the above in detail and the patient understands all of the current recommendation(s). I have answered all of their questions to the best of my ability and to their complete satisfaction.   The patient is to continue with the current management plan.            Electronically signed by Chaz Gates MD

## 2019-09-24 ENCOUNTER — OFFICE VISIT (OUTPATIENT)
Dept: HEMATOLOGY/ONCOLOGY | Facility: CLINIC | Age: 64
End: 2019-09-24
Payer: MEDICARE

## 2019-09-24 ENCOUNTER — TELEPHONE (OUTPATIENT)
Dept: PRIMARY CARE CLINIC | Facility: CLINIC | Age: 64
End: 2019-09-24

## 2019-09-24 VITALS
TEMPERATURE: 98 F | SYSTOLIC BLOOD PRESSURE: 88 MMHG | HEART RATE: 85 BPM | RESPIRATION RATE: 19 BRPM | DIASTOLIC BLOOD PRESSURE: 60 MMHG

## 2019-09-24 DIAGNOSIS — C18.9 MALIGNANT NEOPLASM OF COLON, UNSPECIFIED PART OF COLON: ICD-10-CM

## 2019-09-24 DIAGNOSIS — K76.9 LIVER LESION: ICD-10-CM

## 2019-09-24 DIAGNOSIS — D49.0 CECAL NEOPLASM: ICD-10-CM

## 2019-09-24 DIAGNOSIS — C79.31 BRAIN METASTASIS: ICD-10-CM

## 2019-09-24 DIAGNOSIS — Z74.09 IMPAIRED FUNCTIONAL MOBILITY AND ENDURANCE: Primary | ICD-10-CM

## 2019-09-24 DIAGNOSIS — I69.359 CVA, OLD, HEMIPARESIS: ICD-10-CM

## 2019-09-24 DIAGNOSIS — I61.4 RIGHT-SIDED NONTRAUMATIC INTRACEREBRAL HEMORRHAGE OF CEREBELLUM: ICD-10-CM

## 2019-09-24 DIAGNOSIS — C18.0 MALIGNANT NEOPLASM OF CECUM: Primary | ICD-10-CM

## 2019-09-24 DIAGNOSIS — D50.0 ANEMIA DUE TO GI BLOOD LOSS: ICD-10-CM

## 2019-09-24 DIAGNOSIS — D64.9 ANEMIA, UNSPECIFIED TYPE: ICD-10-CM

## 2019-09-24 PROCEDURE — 99214 PR OFFICE/OUTPT VISIT, EST, LEVL IV, 30-39 MIN: ICD-10-PCS | Mod: S$GLB,,, | Performed by: INTERNAL MEDICINE

## 2019-09-24 PROCEDURE — 99214 OFFICE O/P EST MOD 30 MIN: CPT | Mod: S$GLB,,, | Performed by: INTERNAL MEDICINE

## 2019-09-24 NOTE — TELEPHONE ENCOUNTER
----- Message from Beth Jaimes sent at 9/24/2019 12:40 PM CDT -----  Contact: Sister  Type: Needs Medical Advice    Who Called:  sister Byers  Symptoms (please be specific):  N/A  How long has patient had these symptoms:  N/A  Pharmacy name and phone #:  N/A  Best Call Back Number: [phone] or [phone]  Additional Information: Calling to inquire about the hospital bed that was suppose to be ordered for sister. Please call her. Thanks.

## 2019-09-25 ENCOUNTER — TELEPHONE (OUTPATIENT)
Dept: PRIMARY CARE CLINIC | Facility: CLINIC | Age: 64
End: 2019-09-25

## 2019-09-25 NOTE — TELEPHONE ENCOUNTER
----- Message from Belem Ward sent at 9/25/2019  9:58 AM CDT -----  Contact: [facility] Melissa [phone]  Melissa from [facility] stated patients family is requesting hospice services. She is requesting to have orders to admit.  Please advise, thanks

## 2019-09-25 NOTE — TELEPHONE ENCOUNTER
----- Message from Juan Carlos Chen sent at 9/25/2019 12:18 PM CDT -----  Contact: Tess [phone]  Tess is calling to request Hospice order for the patient, the pt is not able to tolerate medication anymore, please advise.

## 2019-09-26 ENCOUNTER — TELEPHONE (OUTPATIENT)
Dept: HOME HEALTH SERVICES | Facility: HOSPITAL | Age: 64
End: 2019-09-26

## 2019-09-26 ENCOUNTER — TELEPHONE (OUTPATIENT)
Dept: PRIMARY CARE CLINIC | Facility: CLINIC | Age: 64
End: 2019-09-26

## 2019-09-26 NOTE — TELEPHONE ENCOUNTER
Spoke with patient's daughter Janette in regards to her FMLA forms. Patient is requesting weeks of leave to care for her mother who has terminal cancer. FMLA form given to Dr. Winkler.

## 2019-09-27 ENCOUNTER — TELEPHONE (OUTPATIENT)
Dept: PRIMARY CARE CLINIC | Facility: CLINIC | Age: 64
End: 2019-09-27

## 2019-09-27 NOTE — TELEPHONE ENCOUNTER
Patient notified that her [place] is ready to be picked up. Patient verbalized understanding.

## 2019-09-27 NOTE — TELEPHONE ENCOUNTER
----- Message from Yuliana Carballo sent at 9/27/2019 10:04 AM CDT -----  Contact: [phone]/ Janette Victoria  Patient's daughter is requesting a call from the office regarding her FMLA papers.  Patient's daughter is asking the doctor not to fax the papers.     She will need to  the papers first to go over leave days with her supervisor before faxing the papers.    Please advise, thank you

## 2019-10-14 ENCOUNTER — TELEPHONE (OUTPATIENT)
Dept: PRIMARY CARE CLINIC | Facility: CLINIC | Age: 64
End: 2019-10-14

## 2019-10-14 NOTE — TELEPHONE ENCOUNTER
----- Message from Beth Jaimes sent at 10/14/2019  8:54 AM CDT -----  Contact: Shae with [facility] phone [phone]  Shae with [facility] phone [phone], Calling to tell you patient  on 10/11/2019. Thanks.

## 2020-05-04 NOTE — PLAN OF CARE
From: Americo Rojas  To: Jeanne Jenkins MD  Sent: 4/29/2020 4:24 PM CDT  Subject: Other    Dr. Jenkins, Please sign the attached [employer] form with the self-reported data provided at my last visit on 4/23/2020. I am hoping to submit this for the caregiver incentive. Thank you, Seda   Problem: Adult Inpatient Plan of Care  Goal: Plan of Care Review  Outcome: Ongoing (interventions implemented as appropriate)  Uneventful shift. Pt has had no c/o pain this shift. IV abx regimen maintained. Pt using BSCC with 1 assist. Pt has remained free from injury this shift. Bed in low locked position. Bed alarm on. Call light and personal belongings within reach. Side rails up x2. Nonskid socks in place. Pt instructed to call with any needs. Will continue to monitor.

## 2020-11-24 NOTE — PROGRESS NOTES
[facility]  Physical Medicine & Rehab  Progress Note    Patient Name: Kayy Espinal  MRN: [id number]  Admission Date: 7/28/2019  Length of Stay: 2 days  Attending Physician: Holly Montiel MD    Subjective:     Principal Problem:Encephalopathy, metabolic    Hospital Course:   07/29/2019: OT-Bed mobility SBA-CGA .  Sit to stand and transfers Shady. Feeding setupA. Toileting SBA. Passed bedside swallow evaluation.  SLP recommending regular diet and thin liquids.  7/30/19: 07/30/2019: Bed mobility CGA-Shady.  Sit to stand and transfers Shady.  Ambulated 10 ft ModA Unsteadiness, shuffled gait, L lateral lean, posterior lean, c/o fatigue. UBD Shady.    Interval History 7/31/2019:  Patient is seen for follow-up rehab evaluation and recommendations: Participating with therapy.    HPI, Past Medical, Family, and Social History remains the same as documented in the initial encounter.    Scheduled Medications:    amLODIPine  10 mg Oral Daily    atorvastatin  40 mg Oral Daily    losartan  50 mg Oral Daily    pantoprazole  40 mg Oral Daily       Diagnostic Results: Labs: Reviewed    PRN Medications: acetaminophen, Dextrose 10% Bolus, Dextrose 10% Bolus, glucagon (human recombinant), glucose, glucose, insulin aspart U-100, pneumoc 13-raymond conj-dip cr(PF), sodium chloride 0.9%    Review of Systems   Reason unable to perform ROS: limited 2/2 cognition.   Constitutional: Positive for activity change.   Respiratory: Negative for cough and shortness of breath.    Cardiovascular: Negative for chest pain and palpitations.   Musculoskeletal: Positive for gait problem.   Neurological: Positive for facial asymmetry and weakness.   Psychiatric/Behavioral: Positive for confusion.     Objective:     Vital Signs (Most Recent):  Temp: 97.3 °F (36.3 °C) (07/31/19 0715)  Pulse: 78 (07/31/19 0715)  Resp: 18 (07/31/19 0715)  BP: 135/72 (07/31/19 0715)  SpO2: 99 % (07/31/19 0715)    Vital Signs (24h Range):  Temp:  [97.3 °F (36.3    Patient is seen and examined at the bed side, is afebrile now. The Blood cultures have no growth to date and urine culture grew E,.coli.        REVIEW OF SYSTEMS: All other review systems are negative      ALLERGIES: No Known Allergies      Vital Signs Last 24 Hrs  T(C): 37.3 (2020 14:09), Max: 38.1 (2020 21:29)  T(F): 99.1 (2020 14:09), Max: 100.5 (2020 21:29)  HR: 87 (2020 14:09) (74 - 120)  BP: 129/54 (2020 14:09) (104/63 - 130/55)  BP(mean): --  RR: 17 (2020 14:09) (17 - 18)  SpO2: 95% (2020 14:09) (95% - 98%)      PHYSICAL EXAM:  GENERAL: Not in distress   CHEST/LUNG: Not using accessory muscles   HEART: s1 and s2 present  ABDOMEN: Lower abdominal tenderness  EXTREMITIES: No pedal  edema  CNS: Awake and Alert      LABS:                        11.6   10.10 )-----------( 178      ( 2020 14:36 )             35.7                           11.3   11.63 )-----------( 144      ( 2020 06:02 )             34.4       11-24    141  |  108  |  15  ----------------------------<  112<H>  3.9   |  26  |  0.75    Ca    8.1<L>      2020 14:36  Phos  2.4     11-23  Mg     2.1         TPro  5.8<L>  /  Alb  2.1<L>  /  TBili  0.6  /  DBili  x   /  AST  40  /  ALT  27  /  AlkPhos  70  11-23    11-23    142  |  110<H>  |  24<H>  ----------------------------<  100<H>  3.5   |  24  |  0.71    Ca    7.9<L>      2020 06:02  Phos  2.4     11-23  Mg     2.1     11-23    TPro  5.8<L>  /  Alb  2.1<L>  /  TBili  0.6  /  DBili  x   /  AST  40  /  ALT  27  /  AlkPhos  70  11-23        Urinalysis Basic - ( 2020 16:09 )  Color: Yellow / Appearance: Slightly Turbid / S.015 / pH: x  Gluc: x / Ketone: Negative  / Bili: Negative / Urobili: Negative   Blood: x / Protein: 100 / Nitrite: Positive   Leuk Esterase: Moderate / RBC: 5-10 /HPF / WBC >50 /HPF   Sq Epi: x / Non Sq Epi: Few /HPF / Bacteria: Moderate /HPF        MEDICATIONS  (STANDING):    apixaban 2.5 milliGRAM(s) Oral two times a day  aspirin  chewable 81 milliGRAM(s) Oral daily  cefTRIAXone   IVPB 1000 milliGRAM(s) IV Intermittent every 24 hours  metoprolol tartrate 12.5 milliGRAM(s) Oral every 8 hours  nystatin Ointment 1 Application(s) Topical two times a day  pantoprazole    Tablet 40 milliGRAM(s) Oral before breakfast  sodium chloride 0.9%. 1000 milliLiter(s) (80 mL/Hr) IV Continuous <Continuous>      RADIOLOGY & ADDITIONAL TESTS:    20 : Xray Chest 1 View- PORTABLE-Urgent (Xray Chest 1 View- PORTABLE-Urgent .) (20 @ 18:06) Small left base infiltrate.      MICROBIOLOGY DATA:    Culture - Urine (20 @ 02:01)   Specimen Source: .Urine Clean Catch (Midstream)   Culture Results: >100,000 CFU/ml Escherichia coli     Culture - Blood (20 @ 22:16)   Specimen Source: .Blood Blood   Culture Results:  No growth to date.     Culture - Blood (20 @ 22:16)   Specimen Source: .Blood Blood   Culture Results: No growth to date.     Respiratory Viral Panel with COVID-19 by [facility] (20 @ 16:46)   Rapid RVP Result: [place]   SARS-CoV-2: NotDete:          °C)-98.6 °F (37 °C)] 97.3 °F (36.3 °C)  Pulse:  [] 78  Resp:  [18-20] 18  SpO2:  [95 %-99 %] 99 %  BP: (109-139)/(60-73) 135/72     Physical Exam   Constitutional: She appears well-developed and well-nourished.   HENT:   Head: Normocephalic and atraumatic.   Eyes: Right eye exhibits no discharge. Left eye exhibits no discharge.   Neck: Neck supple.   Cardiovascular: Normal rate and intact distal pulses.   Pulmonary/Chest: Effort normal. No respiratory distress.   Abdominal: Soft. There is no tenderness.   Musculoskeletal: She exhibits no edema or deformity.   R sided weakness    Neurological: She is alert. She is disoriented. She exhibits abnormal muscle tone.   Follows commands   Facial asymmetry    Skin: Skin is warm and dry.   Psychiatric: She has a normal mood and affect. Her behavior is normal. Cognition and memory are impaired.   Vitals reviewed.      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      Recommendations  -  Encourage mobility, OOB in chair at least 3 hours per day, and early ambulation as appropriate   -  PT/OT evaluate and treat  -  SLP speech and cognitive evaluate and treat  -  Monitor sleep disturbances and establish consistent sleep-wake cycle  -  Monitor for bowel and bladder dysfunction  -  Monitor for shoulder pain, subluxation, & spasticity  -  Monitor for and prevent skin breakdown and pressure ulcers  · Early mobility, repositioning/weight shifting every 20-30 minutes when sitting, turn patient every 2 hours, proper mattress/overlay and chair cushioning, pressure relief/heel protector boots  -  Reviewed discharge options (IP rehab, SNF, HH therapy, and OP  therapy)    Impaired functional mobility and endurance  Recommendations  -  Encourage mobility, OOB in chair at least 3 hours per day, and early ambulation as appropriate  -  PT/OT evaluate and treat  -  Pain management  -  Monitor for and prevent skin breakdown and pressure ulcers  · Early mobility, repositioning/weight shifting every 20-30 minutes when sitting, turn patient every 2 hours, proper mattress/overlay and chair cushioning, pressure relief/heel protector boots  -  DVT prophylaxis    -  Reviewed discharge options (IP rehab, SNF, HH therapy, and OP therapy)    Cecal neoplasm  -GI consulted  -plans for c-scope today     Recommend Inpatient Rehab. C-scope pending for today.       Carolyne Hollingsworth NP  Department of Physical Medicine & Rehab   [facility]     Patient is seen and examined at the bed side, is afebrile now, spiked fever last night. She mentioned feeling better, after placing the alfonso catheter.  The Blood cultures have no growth to date and urine culture grew E,.coli.        REVIEW OF SYSTEMS: All other review systems are negative      ALLERGIES: No Known Allergies      Vital Signs Last 24 Hrs  T(C): 37.3 (2020 14:09), Max: 38.1 (2020 21:29)  T(F): 99.1 (2020 14:09), Max: 100.5 (2020 21:29)  HR: 87 (2020 14:09) (74 - 120)  BP: 129/54 (2020 14:09) (104/63 - 130/55)  BP(mean): --  RR: 17 (2020 14:09) (17 - 18)  SpO2: 95% (2020 14:09) (95% - 98%)      PHYSICAL EXAM:  GENERAL: Not in distress   CHEST/LUNG: Not using accessory muscles   HEART: s1 and s2 present  ABDOMEN: Lower abdominal tenderness resolved  : Alfonso catheter in placed   EXTREMITIES: No pedal  edema  CNS: Awake and Alert      LABS:                        11.6   10.10 )-----------( 178      ( :36 )             35.7                           11.3   11.63 )-----------( 144      ( 2020 06:02 )             34.4       11    141  |  108  |  15  ----------------------------<  112<H>  3.9   |  26  |  0.75    Ca    8.1<L>      :36  Phos  2.4       Mg     2.1         TPro  5.8<L>  /  Alb  2.1<L>  /  TBili  0.6  /  DBili  x   /  AST  40  /  ALT  27  /  AlkPhos  70      142  |  110<H>  |  24<H>  ----------------------------<  100<H>  3.5   |  24  |  0.71    Ca    7.9<L>      2020 06:02  Phos  2.4       Mg     2.1         TPro  5.8<L>  /  Alb  2.1<L>  /  TBili  0.6  /  DBili  x   /  AST  40  /  ALT  27  /  AlkPhos  70          Urinalysis Basic - ( 2020 16:09 )  Color: Yellow / Appearance: Slightly Turbid / S.015 / pH: x  Gluc: x / Ketone: Negative  / Bili: Negative / Urobili: Negative   Blood: x / Protein: 100 / Nitrite: Positive   Leuk Esterase: Moderate / RBC: 5-10 /HPF / WBC >50 /HPF   Sq Epi: x / Non Sq Epi: Few /HPF / Bacteria: Moderate /HPF        MEDICATIONS  (STANDING):    apixaban 2.5 milliGRAM(s) Oral two times a day  aspirin  chewable 81 milliGRAM(s) Oral daily  cefTRIAXone   IVPB 1000 milliGRAM(s) IV Intermittent every 24 hours  metoprolol tartrate 12.5 milliGRAM(s) Oral every 8 hours  nystatin Ointment 1 Application(s) Topical two times a day  pantoprazole    Tablet 40 milliGRAM(s) Oral before breakfast  sodium chloride 0.9%. 1000 milliLiter(s) (80 mL/Hr) IV Continuous <Continuous>      RADIOLOGY & ADDITIONAL TESTS:    20 : Xray Chest 1 View- PORTABLE-Urgent (Xray Chest 1 View- PORTABLE-Urgent .) (20 @ 18:06) Small left base infiltrate.      MICROBIOLOGY DATA:    Culture - Urine (20 @ 02:01)   Specimen Source: .Urine Clean Catch (Midstream)   Culture Results: >100,000 CFU/ml Escherichia coli     Culture - Blood (20 @ 22:16)   Specimen Source: .Blood Blood   Culture Results:  No growth to date.     Culture - Blood (20 @ 22:16)   Specimen Source: .Blood Blood   Culture Results: No growth to date.     Respiratory Viral Panel with COVID-19 by [facility] (20 @ 16:46)   Rapid RVP Result: NotDete   SARS-CoV-2: NotDetec:

## 2023-09-18 NOTE — PROGRESS NOTES
Database initiated. Patient is A&O from home with . States she uses no assistive devices and wears 5 liters oxygen at baseline.  She is having diarrhea Pt back to floor

## 2023-11-02 NOTE — SUBJECTIVE & OBJECTIVE
Past Medical History:   Diagnosis Date    Asthma     Chronic anemia     Diabetes mellitus     Hypertension     Stroke        Past Surgical History:   Procedure Laterality Date    ESOPHAGOGASTRODUODENOSCOPY (EGD) N/A 6/12/2019    Performed by Wilber Duarte MD at [facility] ENDO       Review of patient's allergies indicates:  No Known Allergies  Family History     Problem Relation (Age of Onset)    Diabetes Mother    Hypertension Mother        Tobacco Use    Smoking status: Never Smoker    Smokeless tobacco: Never Used   Substance and Sexual Activity    Alcohol use: Not Currently    Drug use: Not Currently    Sexual activity: Not Currently     Review of Systems   Unable to perform ROS: Mental status change     Objective:     Vital Signs (Most Recent):  Temp: 98.3 °F (36.8 °C) (07/29/19 0319)  Pulse: 92 (07/29/19 0319)  Resp: 18 (07/29/19 0319)  BP: 137/84 (07/29/19 0319)  SpO2: 97 % (07/29/19 0319) Vital Signs (24h Range):  Temp:  [97.4 °F (36.3 °C)-98.4 °F (36.9 °C)] 98.3 °F (36.8 °C)  Pulse:  [78-96] 92  Resp:  [16-18] 18  SpO2:  [97 %-100 %] 97 %  BP: (131-182)/(69-88) 137/84     Weight: 61.8 kg (136 lb 3.9 oz) (07/28/19 1911)  Body mass index is 22.67 kg/m².      Intake/Output Summary (Last 24 hours) at 7/29/2019 0736  Last data filed at 7/29/2019 0400  Gross per 24 hour   Intake --   Output 200 ml   Net -200 ml       Lines/Drains/Airways     Peripheral Intravenous Line                 Peripheral IV - Single Lumen 07/28/19 1954 20 G Right Forearm less than 1 day                Physical Exam   Constitutional: She is oriented to person, place, and time. No distress.   HENT:   Head: Normocephalic and atraumatic.   Eyes: No scleral icterus.   Cardiovascular: Normal rate and regular rhythm.   Pulmonary/Chest: Effort normal and breath sounds normal.   Abdominal: Soft. Bowel sounds are normal.   Musculoskeletal: She exhibits no edema.   Neurological: She is alert and oriented to person, place, and time.   Skin:  She is not diaphoretic.   Nursing note and vitals reviewed.      Significant Labs:  CBC:   Recent Labs   Lab 07/28/19 1951   WBC 11.68   HGB 10.2*   HCT 35.1*   *     CMP:   Recent Labs   Lab 07/29/19  0428   GLU 96   CALCIUM 8.9   ALBUMIN 2.0*   PROT 6.4      K 3.6   CO2 26   CL 99   BUN 8   CREATININE 0.7   ALKPHOS 126   ALT 6*   AST 30   BILITOT 0.6     Coagulation:   Recent Labs   Lab 07/28/19  2115   INR 1.0       Significant Imaging:  Imaging results within the past 24 hours have been reviewed.   yes...